# Patient Record
Sex: FEMALE | Race: WHITE | NOT HISPANIC OR LATINO | Employment: OTHER | URBAN - METROPOLITAN AREA
[De-identification: names, ages, dates, MRNs, and addresses within clinical notes are randomized per-mention and may not be internally consistent; named-entity substitution may affect disease eponyms.]

---

## 2024-08-04 ENCOUNTER — HOSPITAL ENCOUNTER (EMERGENCY)
Facility: HOSPITAL | Age: 89
Discharge: HOME | End: 2024-08-05
Attending: EMERGENCY MEDICINE
Payer: MEDICARE

## 2024-08-04 ENCOUNTER — APPOINTMENT (OUTPATIENT)
Dept: RADIOLOGY | Facility: HOSPITAL | Age: 89
End: 2024-08-04
Payer: MEDICARE

## 2024-08-04 DIAGNOSIS — M25.462 EFFUSION OF LEFT KNEE JOINT: Primary | ICD-10-CM

## 2024-08-04 DIAGNOSIS — E87.1 HYPONATREMIA: ICD-10-CM

## 2024-08-04 DIAGNOSIS — R31.9 URINARY TRACT INFECTION WITH HEMATURIA, SITE UNSPECIFIED: ICD-10-CM

## 2024-08-04 DIAGNOSIS — M25.562 ACUTE PAIN OF LEFT KNEE: ICD-10-CM

## 2024-08-04 DIAGNOSIS — N39.0 URINARY TRACT INFECTION WITH HEMATURIA, SITE UNSPECIFIED: ICD-10-CM

## 2024-08-04 PROCEDURE — 83735 ASSAY OF MAGNESIUM: CPT | Performed by: EMERGENCY MEDICINE

## 2024-08-04 PROCEDURE — 73502 X-RAY EXAM HIP UNI 2-3 VIEWS: CPT | Mod: LT

## 2024-08-04 PROCEDURE — 72125 CT NECK SPINE W/O DYE: CPT

## 2024-08-04 PROCEDURE — 85025 COMPLETE CBC W/AUTO DIFF WBC: CPT | Performed by: EMERGENCY MEDICINE

## 2024-08-04 PROCEDURE — 72125 CT NECK SPINE W/O DYE: CPT | Performed by: RADIOLOGY

## 2024-08-04 PROCEDURE — 85730 THROMBOPLASTIN TIME PARTIAL: CPT | Performed by: EMERGENCY MEDICINE

## 2024-08-04 PROCEDURE — 71045 X-RAY EXAM CHEST 1 VIEW: CPT | Performed by: RADIOLOGY

## 2024-08-04 PROCEDURE — 85610 PROTHROMBIN TIME: CPT | Performed by: EMERGENCY MEDICINE

## 2024-08-04 PROCEDURE — 84484 ASSAY OF TROPONIN QUANT: CPT | Performed by: EMERGENCY MEDICINE

## 2024-08-04 PROCEDURE — 84075 ASSAY ALKALINE PHOSPHATASE: CPT | Performed by: EMERGENCY MEDICINE

## 2024-08-04 PROCEDURE — 36415 COLL VENOUS BLD VENIPUNCTURE: CPT | Performed by: EMERGENCY MEDICINE

## 2024-08-04 PROCEDURE — 83880 ASSAY OF NATRIURETIC PEPTIDE: CPT | Performed by: EMERGENCY MEDICINE

## 2024-08-04 PROCEDURE — 73502 X-RAY EXAM HIP UNI 2-3 VIEWS: CPT | Mod: LEFT SIDE | Performed by: RADIOLOGY

## 2024-08-04 PROCEDURE — 99285 EMERGENCY DEPT VISIT HI MDM: CPT | Mod: 25

## 2024-08-04 PROCEDURE — 70450 CT HEAD/BRAIN W/O DYE: CPT

## 2024-08-04 PROCEDURE — 73564 X-RAY EXAM KNEE 4 OR MORE: CPT | Mod: LEFT SIDE | Performed by: RADIOLOGY

## 2024-08-04 PROCEDURE — 73564 X-RAY EXAM KNEE 4 OR MORE: CPT | Mod: LT

## 2024-08-04 PROCEDURE — 70450 CT HEAD/BRAIN W/O DYE: CPT | Performed by: RADIOLOGY

## 2024-08-04 PROCEDURE — 71045 X-RAY EXAM CHEST 1 VIEW: CPT

## 2024-08-04 RX ORDER — WARFARIN SODIUM 5 MG/1
TABLET ORAL
COMMUNITY
Start: 2024-03-21

## 2024-08-04 RX ORDER — LISINOPRIL AND HYDROCHLOROTHIAZIDE 10; 12.5 MG/1; MG/1
1.5 TABLET ORAL DAILY
COMMUNITY
Start: 2023-10-20

## 2024-08-04 RX ORDER — FUROSEMIDE 20 MG/1
20 TABLET ORAL
COMMUNITY
Start: 2024-08-02 | End: 2024-08-05

## 2024-08-04 RX ORDER — NITROFURANTOIN 25; 75 MG/1; MG/1
100 CAPSULE ORAL 2 TIMES DAILY
COMMUNITY
Start: 2024-08-02 | End: 2024-08-09

## 2024-08-04 RX ORDER — NAPROXEN SODIUM 220 MG/1
1 TABLET, FILM COATED ORAL DAILY
COMMUNITY

## 2024-08-04 RX ORDER — METHIMAZOLE 5 MG/1
TABLET ORAL
COMMUNITY
Start: 2024-07-12

## 2024-08-04 RX ORDER — LISINOPRIL 20 MG/1
20 TABLET ORAL
COMMUNITY
Start: 2024-07-26 | End: 2024-10-24

## 2024-08-04 RX ORDER — TRIAMCINOLONE ACETONIDE 1 MG/G
OINTMENT TOPICAL
COMMUNITY

## 2024-08-04 RX ORDER — METOPROLOL TARTRATE 25 MG/1
25 TABLET, FILM COATED ORAL EVERY 12 HOURS
COMMUNITY
Start: 2024-02-17

## 2024-08-04 RX ORDER — MECLIZINE HYDROCHLORIDE 25 MG/1
TABLET ORAL 3 TIMES DAILY PRN
COMMUNITY

## 2024-08-04 RX ORDER — TRAMADOL HYDROCHLORIDE 50 MG/1
50 TABLET ORAL EVERY 12 HOURS PRN
COMMUNITY
Start: 2024-07-03

## 2024-08-04 RX ORDER — TRAMADOL HYDROCHLORIDE 50 MG/1
TABLET ORAL
COMMUNITY

## 2024-08-04 RX ORDER — LISINOPRIL AND HYDROCHLOROTHIAZIDE 10; 12.5 MG/1; MG/1
1 TABLET ORAL DAILY
COMMUNITY

## 2024-08-04 ASSESSMENT — PAIN SCALES - GENERAL
PAINLEVEL_OUTOF10: 9
PAINLEVEL_OUTOF10: 9

## 2024-08-04 ASSESSMENT — PAIN - FUNCTIONAL ASSESSMENT: PAIN_FUNCTIONAL_ASSESSMENT: 0-10

## 2024-08-04 ASSESSMENT — LIFESTYLE VARIABLES
HAVE PEOPLE ANNOYED YOU BY CRITICIZING YOUR DRINKING: NO
EVER HAD A DRINK FIRST THING IN THE MORNING TO STEADY YOUR NERVES TO GET RID OF A HANGOVER: NO
EVER FELT BAD OR GUILTY ABOUT YOUR DRINKING: NO

## 2024-08-04 ASSESSMENT — PAIN DESCRIPTION - ORIENTATION: ORIENTATION: LEFT

## 2024-08-04 ASSESSMENT — PAIN DESCRIPTION - LOCATION: LOCATION: KNEE

## 2024-08-05 ENCOUNTER — APPOINTMENT (OUTPATIENT)
Dept: RADIOLOGY | Facility: HOSPITAL | Age: 89
End: 2024-08-05
Payer: MEDICARE

## 2024-08-05 ENCOUNTER — APPOINTMENT (OUTPATIENT)
Dept: CARDIOLOGY | Facility: HOSPITAL | Age: 89
End: 2024-08-05
Payer: MEDICARE

## 2024-08-05 VITALS
HEIGHT: 61 IN | SYSTOLIC BLOOD PRESSURE: 175 MMHG | WEIGHT: 143 LBS | HEART RATE: 76 BPM | RESPIRATION RATE: 18 BRPM | DIASTOLIC BLOOD PRESSURE: 75 MMHG | TEMPERATURE: 97.3 F | OXYGEN SATURATION: 98 % | BODY MASS INDEX: 27 KG/M2

## 2024-08-05 LAB
ALBUMIN SERPL BCP-MCNC: 4 G/DL (ref 3.4–5)
ALP SERPL-CCNC: 82 U/L (ref 33–136)
ALT SERPL W P-5'-P-CCNC: 7 U/L (ref 7–45)
ANION GAP SERPL CALC-SCNC: 16 MMOL/L (ref 10–20)
APPEARANCE UR: ABNORMAL
APTT PPP: 41 SECONDS (ref 27–38)
AST SERPL W P-5'-P-CCNC: 13 U/L (ref 9–39)
ATRIAL RATE: 85 BPM
BACTERIA #/AREA URNS AUTO: ABNORMAL /HPF
BASOPHILS # BLD AUTO: 0.06 X10*3/UL (ref 0–0.1)
BASOPHILS NFR BLD AUTO: 0.3 %
BILIRUB SERPL-MCNC: 0.7 MG/DL (ref 0–1.2)
BILIRUB UR STRIP.AUTO-MCNC: NEGATIVE MG/DL
BNP SERPL-MCNC: 175 PG/ML (ref 0–99)
BUN SERPL-MCNC: 21 MG/DL (ref 6–23)
CALCIUM SERPL-MCNC: 10 MG/DL (ref 8.6–10.3)
CARDIAC TROPONIN I PNL SERPL HS: 6 NG/L (ref 0–13)
CHLORIDE SERPL-SCNC: 93 MMOL/L (ref 98–107)
CO2 SERPL-SCNC: 22 MMOL/L (ref 21–32)
COLOR UR: ABNORMAL
CREAT SERPL-MCNC: 0.93 MG/DL (ref 0.5–1.05)
EGFRCR SERPLBLD CKD-EPI 2021: 58 ML/MIN/1.73M*2
EOSINOPHIL # BLD AUTO: 0.18 X10*3/UL (ref 0–0.4)
EOSINOPHIL NFR BLD AUTO: 1 %
ERYTHROCYTE [DISTWIDTH] IN BLOOD BY AUTOMATED COUNT: 12.6 % (ref 11.5–14.5)
GLUCOSE SERPL-MCNC: 104 MG/DL (ref 74–99)
GLUCOSE UR STRIP.AUTO-MCNC: NORMAL MG/DL
HCT VFR BLD AUTO: 38.4 % (ref 36–46)
HGB BLD-MCNC: 13.1 G/DL (ref 12–16)
HOLD SPECIMEN: NORMAL
IMM GRANULOCYTES # BLD AUTO: 0.11 X10*3/UL (ref 0–0.5)
IMM GRANULOCYTES NFR BLD AUTO: 0.6 % (ref 0–0.9)
INR PPP: 2.8 (ref 0.9–1.1)
KETONES UR STRIP.AUTO-MCNC: ABNORMAL MG/DL
LEUKOCYTE ESTERASE UR QL STRIP.AUTO: ABNORMAL
LYMPHOCYTES # BLD AUTO: 1.34 X10*3/UL (ref 0.8–3)
LYMPHOCYTES NFR BLD AUTO: 7.5 %
MAGNESIUM SERPL-MCNC: 1.76 MG/DL (ref 1.6–2.4)
MCH RBC QN AUTO: 29.9 PG (ref 26–34)
MCHC RBC AUTO-ENTMCNC: 34.1 G/DL (ref 32–36)
MCV RBC AUTO: 88 FL (ref 80–100)
MONOCYTES # BLD AUTO: 0.81 X10*3/UL (ref 0.05–0.8)
MONOCYTES NFR BLD AUTO: 4.5 %
NEUTROPHILS # BLD AUTO: 15.43 X10*3/UL (ref 1.6–5.5)
NEUTROPHILS NFR BLD AUTO: 86.1 %
NITRITE UR QL STRIP.AUTO: NEGATIVE
NRBC BLD-RTO: 0 /100 WBCS (ref 0–0)
P AXIS: 70 DEGREES
P OFFSET: 153 MS
P ONSET: 83 MS
PH UR STRIP.AUTO: 8 [PH]
PLATELET # BLD AUTO: 310 X10*3/UL (ref 150–450)
POTASSIUM SERPL-SCNC: 4.2 MMOL/L (ref 3.5–5.3)
PR INTERVAL: 276 MS
PROT SERPL-MCNC: 7.7 G/DL (ref 6.4–8.2)
PROT UR STRIP.AUTO-MCNC: ABNORMAL MG/DL
PROTHROMBIN TIME: 31.5 SECONDS (ref 9.8–12.8)
Q ONSET: 221 MS
QRS COUNT: 14 BEATS
QRS DURATION: 112 MS
QT INTERVAL: 388 MS
QTC CALCULATION(BAZETT): 461 MS
QTC FREDERICIA: 435 MS
R AXIS: -21 DEGREES
RBC # BLD AUTO: 4.38 X10*6/UL (ref 4–5.2)
RBC # UR STRIP.AUTO: ABNORMAL /UL
RBC #/AREA URNS AUTO: >20 /HPF
SODIUM SERPL-SCNC: 127 MMOL/L (ref 136–145)
SP GR UR STRIP.AUTO: 1.03
SQUAMOUS #/AREA URNS AUTO: ABNORMAL /HPF
T AXIS: 25 DEGREES
T OFFSET: 415 MS
UROBILINOGEN UR STRIP.AUTO-MCNC: NORMAL MG/DL
VENTRICULAR RATE: 85 BPM
WBC # BLD AUTO: 17.9 X10*3/UL (ref 4.4–11.3)
WBC #/AREA URNS AUTO: >50 /HPF
WBC CLUMPS #/AREA URNS AUTO: ABNORMAL /HPF

## 2024-08-05 PROCEDURE — 87086 URINE CULTURE/COLONY COUNT: CPT | Mod: ELYLAB | Performed by: EMERGENCY MEDICINE

## 2024-08-05 PROCEDURE — 81001 URINALYSIS AUTO W/SCOPE: CPT | Performed by: EMERGENCY MEDICINE

## 2024-08-05 PROCEDURE — 93005 ELECTROCARDIOGRAM TRACING: CPT

## 2024-08-05 PROCEDURE — 2500000004 HC RX 250 GENERAL PHARMACY W/ HCPCS (ALT 636 FOR OP/ED): Performed by: EMERGENCY MEDICINE

## 2024-08-05 PROCEDURE — 96374 THER/PROPH/DIAG INJ IV PUSH: CPT

## 2024-08-05 PROCEDURE — 73700 CT LOWER EXTREMITY W/O DYE: CPT | Mod: LEFT SIDE | Performed by: RADIOLOGY

## 2024-08-05 PROCEDURE — 73700 CT LOWER EXTREMITY W/O DYE: CPT | Mod: LT

## 2024-08-05 PROCEDURE — 96375 TX/PRO/DX INJ NEW DRUG ADDON: CPT

## 2024-08-05 RX ORDER — ACETAMINOPHEN 325 MG/1
1000 TABLET ORAL ONCE
Status: COMPLETED | OUTPATIENT
Start: 2024-08-05 | End: 2024-08-05

## 2024-08-05 RX ORDER — ONDANSETRON HYDROCHLORIDE 2 MG/ML
4 INJECTION, SOLUTION INTRAVENOUS ONCE
Status: COMPLETED | OUTPATIENT
Start: 2024-08-05 | End: 2024-08-05

## 2024-08-05 RX ORDER — FENTANYL CITRATE 50 UG/ML
25 INJECTION, SOLUTION INTRAMUSCULAR; INTRAVENOUS ONCE
Status: COMPLETED | OUTPATIENT
Start: 2024-08-05 | End: 2024-08-05

## 2024-08-05 ASSESSMENT — PAIN SCALES - GENERAL
PAINLEVEL_OUTOF10: 8
PAINLEVEL_OUTOF10: 8

## 2024-08-05 NOTE — DISCHARGE INSTRUCTIONS
Follow-up with your primary care doctor and orthopedic surgeon.  We offered admission for further evaluation and treatment of your symptoms, but you prefer to follow-up closely outpatient instead.  If you change your mind at any time you can return to the emergency department for further evaluation.  You can take Tylenol as directed for symptomatic treatment at home.  I recommend you continue to take oral antibiotic for your urinary tract infection.  You should also follow-up with your primary care doctor to have your sodium levels rechecked.    
No

## 2024-08-05 NOTE — ED PROVIDER NOTES
89-year-old female presents emergency department with chief complaint of left knee pain and swelling.  Patient reports about a week ago she had fallen onto her left side and has a bruise on her left thigh.  She reports since then she has been able to ambulate, but today around 8 PM all of a sudden her left knee hurt and she noticed it was swollen.  She denies any fevers, coughing, or congestion.  She denies any chest pain or significant difficulty breathing.  No abdominal pain, nausea, or vomiting.  No dysuria.  She reports constipation no black or bloody stools.  Patient reports that she does have some swelling in her legs that she states is chronic but slightly worse than normal.  Patient is on Coumadin based on chart review.  Patient reports that when she fell she had tripped because her left foot got twisted.  She reports she did hit her head, but did not lose consciousness.  Patient was not evaluated after this fall. Chart review shows significant past medical history for hypertension, osteoarthritis, hypertension, paroxysmal atrial fibrillation, peripheral vascular disease, chronic kidney disease, varicose veins, patient is on Coumadin.      History provided by:  Patient   used: No           ------------------------------------------------------------------------------------------------------------------------------------------    VS: As documented in the triage note and EMR flowsheet from this visit were reviewed.    Review of Systems  Constitutional: no fever, chills  Eyes: no redness, discharge, pain  HENT: no sore throat, nose bleeds, congestion, rhinorrhea   Cardiovascular: no chest pain, palpitations reports chronic leg edema  Respiratory: no shortness of breath, cough, wheezing  GI: no nausea, diarrhea, pain, vomiting,  BRBPR, melena admits to constipation  : no dysuria, frequency, hematuria  Musculoskeletal: no neck pain, stiffness, reports swelling and pain of the left  knee  Skin: no rash, erythema, wounds  Neurological: no headache, dizziness, lightheadedness, weakness, numbness, or tingling  Psychiatric: no suicidal thoughts, confusion, agitation  Metabolic: no polyuria or polydipsia  Hematologic: Patient is on Coumadin  Immunology: No immunocompromise state    [facility]  Nursing notes reviewed and confirmed by me.  Chart review performed including medications, allergies, and medical, surgical, and family history  Visit Vitals  /75   Pulse 76   Temp 36.3 °C (97.3 °F)   Resp 18     Physical Exam  Vitals and nursing note reviewed.   Constitutional:       General: She is not in acute distress.     Appearance: Normal appearance. She is not ill-appearing.   HENT:      Head: Normocephalic and atraumatic.      Right Ear: External ear normal.      Left Ear: External ear normal.      Nose: Nose normal. No congestion or rhinorrhea.      Mouth/Throat:      Mouth: Mucous membranes are moist.      Pharynx: No oropharyngeal exudate or posterior oropharyngeal erythema.   Eyes:      Extraocular Movements: Extraocular movements intact.      Conjunctiva/sclera: Conjunctivae normal.      Pupils: Pupils are equal, round, and reactive to light.   Neck:      Comments: No midline neck tenderness, step-offs, or deformities  Cardiovascular:      Rate and Rhythm: Normal rate and regular rhythm.      Pulses: Normal pulses.      Heart sounds: Normal heart sounds.      Comments: 1+ DP pulses bilaterally.  Pulmonary:      Effort: Pulmonary effort is normal. No respiratory distress.      Breath sounds: No stridor. No wheezing, rhonchi or rales.      Comments: Coarse breath sounds bilaterally somewhat diminished in the bases.  Abdominal:      General: There is no distension.      Palpations: Abdomen is soft.      Tenderness: There is no abdominal tenderness. There is no guarding or rebound.   Musculoskeletal:         General: Swelling, tenderness and signs of injury present. No deformity.      Cervical back:  Neck supple. No rigidity.      Left knee: Swelling and effusion present. Decreased range of motion. Tenderness present.      Right lower leg: Edema present.      Left lower leg: Edema present.      Comments: No calf tenderness   1+ pitting edema bilateral lower extremities.  Left knee is appreciated to be swollen when compared to the right.  It is normal in color no erythema or warmth.  There is appreciated to be slight effusion.  Patient has pain at extremes of extension and flexion, but is able to range the knee.  Tenderness to palpation on the lateral aspect of the knee.  Pelvis is stable.  No midline back tenderness, step-offs, or deformities.   Skin:     General: Skin is warm and dry.      Capillary Refill: Capillary refill takes less than 2 seconds.      Coloration: Skin is not jaundiced.      Findings: No rash.      Comments: Ecchymosis lateral left thigh.     Neurological:      General: No focal deficit present.      Mental Status: She is alert and oriented to person, place, and time.      Sensory: No sensory deficit.      Motor: No weakness.      Comments: Cranial nerves II through XII are grossly intact.   Psychiatric:         Mood and Affect: Mood normal.         Behavior: Behavior normal.        History reviewed. No pertinent past medical history.   Past Surgical History:   Procedure Laterality Date    CT ABDOMEN PELVIS ANGIOGRAM W AND/OR WO IV CONTRAST  9/8/2019    CT ABDOMEN PELVIS ANGIOGRAM W AND/OR WO IV [facility]AST [facility]      Social History     Socioeconomic History    Marital status:      Social Determinants of Health     Financial Resource Strain: Patient Declined (7/19/2024)    Received from [facility] [place]    Overall Financial Resource Strain (CARDIA)     Difficulty of Paying Living Expenses: Patient declined   Food Insecurity: Patient Declined (7/19/2024)    Received from [facility] [place]    Hunger Vital Sign     Worried About Running Out of Food in the Last Year:  Patient declined     Ran Out of Food in the Last Year: Patient declined   Transportation Needs: Patient Declined (7/19/2024)    Received from [facility]    PRAPARE - Transportation     Lack of Transportation (Medical): Patient declined     Lack of Transportation (Non-Medical): Patient declined   Physical Activity: Patient Declined (7/19/2024)    Received from [facility]    Exercise Vital Sign     Days of Exercise per Week: Patient declined     Minutes of Exercise per Session: Patient declined   Stress: Patient Declined (1/19/2024)    Received from [facility]    [employer] - Occupational Stress Questionnaire     Feeling of Stress : Patient declined   Social Connections: Unknown (7/19/2024)    Received from [facility]    Social Connection and Isolation Panel [NHANES]     Frequency of Communication with Friends and Family: More than three times a week     Frequency of Social Gatherings with Friends and Family: Patient declined     Attends Jain Services: Patient declined     Active Member of Clubs or Organizations: Yes     Attends Club or Organization Meetings: More than 4 times per year     Marital Status: Patient declined      ------------------------------------------------------------------------------------------------------------------------------------------  CT hip left wo IV contrast   Final Result   No evidence of acute displaced left hip fracture.        MACRO:   None        Signed by: Tam Gutiérrez 8/5/2024 3:18 AM   Dictation workstation:   [id number]      CT knee left wo IV contrast   Final Result   No definite evidence of acute displaced left knee fracture.        Left knee osteoarthrosis.        Large hemorrhagic left knee effusion; orthopedic   consultation/evaluation is recommended for further assessment given   the unexplained large hemorrhagic effusion.        MACRO:   None        Signed by: Tam Gutiérrez 8/5/2024 3:36 AM   Dictation  workstation:   [id number]      XR chest 1 view   Final Result   1. Mild diffuse interstitial prominence, likely interstitial edema.   2. Bilateral reticular opacities, likely fibrotic change.        Signed by: Bear Roe 8/5/2024 12:54 AM   Dictation workstation:   [id number]      XR hip left with pelvis when performed 2 or 3 views   Final Result   1. No acute osseous abnormality. Diffuse osteopenia limits evaluation   for subtle fractures.        Signed by: Bear oRe 8/5/2024 12:58 AM   Dictation workstation:   [id number]      XR knee left 4+ views   Final Result   1. No acute fracture detected. If there is concern for   radiographically occult fracture, consider CT.   2. Large knee joint effusion.   3. Mild osteoarthritis.        Signed by: Bear Roe 8/5/2024 12:56 AM   Dictation workstation:   [id number]      CT head wo IV contrast   Final Result   1. No acute intracranial hemorrhage or mass effect.   2. No acute fracture or traumatic malalignment of the cervical spine.        Signed by: Bear Roe 8/5/2024 12:53 AM   Dictation workstation:   [id number]      CT cervical spine wo IV contrast   Final Result   1. No acute intracranial hemorrhage or mass effect.   2. No acute fracture or traumatic malalignment of the cervical spine.        Signed by: Bear Roe 8/5/2024 12:53 AM   Dictation workstation:   [id number]         Labs Reviewed   CBC WITH AUTO DIFFERENTIAL - Abnormal       Result Value    WBC 17.9 (*)     nRBC 0.0      RBC 4.38      Hemoglobin 13.1      Hematocrit 38.4      MCV 88      MCH 29.9      MCHC 34.1      RDW 12.6      Platelets 310      Neutrophils % 86.1      Immature Granulocytes %, Automated 0.6      Lymphocytes % 7.5      Monocytes % 4.5      Eosinophils % 1.0      Basophils % 0.3      Neutrophils Absolute 15.43 (*)     Immature Granulocytes Absolute, Automated 0.11      Lymphocytes Absolute 1.34      Monocytes Absolute 0.81 (*)      Eosinophils Absolute 0.18      Basophils Absolute 0.06     COMPREHENSIVE METABOLIC PANEL - Abnormal    Glucose 104 (*)     Sodium 127 (*)     Potassium 4.2      Chloride 93 (*)     Bicarbonate 22      Anion Gap 16      Urea Nitrogen 21      Creatinine 0.93      eGFR 58 (*)     Calcium 10.0      Albumin 4.0      Alkaline Phosphatase 82      Total Protein 7.7      AST 13      Bilirubin, Total 0.7      ALT 7     PROTIME-INR - Abnormal    Protime 31.5 (*)     INR 2.8 (*)    APTT - Abnormal    aPTT 41 (*)     Narrative:     The APTT is no longer used for monitoring Unfractionated Heparin Therapy. For monitoring Heparin Therapy, use the Heparin Assay.   URINALYSIS WITH REFLEX CULTURE AND MICROSCOPIC - Abnormal    Color, Urine Dark-Yellow      Appearance, Urine Ex.Turbid (*)     Specific Gravity, Urine 1.026      pH, Urine 8.0      Protein, Urine 100 (2+) (*)     Glucose, Urine Normal      Blood, Urine OVER (3+) (*)     Ketones, Urine TRACE (*)     Bilirubin, Urine NEGATIVE      Urobilinogen, Urine Normal      Nitrite, Urine NEGATIVE      Leukocyte Esterase, Urine 500 Jessi/µL (*)    B-TYPE NATRIURETIC PEPTIDE - Abnormal     (*)     Narrative:        <100 pg/mL - Heart failure unlikely  100-299 pg/mL - Intermediate probability of acute heart                  failure exacerbation. Correlate with clinical                  context and patient history.    >=300 pg/mL - Heart Failure likely. Correlate with clinical                  context and patient history.    BNP testing is performed using different testing methodology at [facility] than at other [facility]. Direct result comparisons should only be made within the same method.      MICROSCOPIC ONLY, URINE - Abnormal    WBC, Urine >50 (*)     WBC Clumps, Urine MANY      RBC, Urine >20 (*)     Squamous Epithelial Cells, Urine 1-9 (SPARSE)      Bacteria, Urine 1+ (*)    MAGNESIUM - Normal    Magnesium 1.76     TROPONIN I, HIGH SENSITIVITY - Normal     Troponin I, High Sensitivity 6      Narrative:     Less than 99th percentile of normal range cutoff-  Female and children under 18 years old <14 ng/L; Male <21 ng/L: Negative  Repeat testing should be performed if clinically indicated.     Female and children under 18 years old 14-50 ng/L; Male 21-50 ng/L:  Consistent with possible cardiac damage and possible increased clinical   risk. Serial measurements may help to assess extent of myocardial damage.     >50 ng/L: Consistent with cardiac damage, increased clinical risk and  myocardial infarction. Serial measurements may help assess extent of   myocardial damage.      NOTE: Children less than 1 year old may have higher baseline troponin   levels and results should be interpreted in conjunction with the overall   clinical context.     NOTE: Troponin I testing is performed using a different   testing methodology at [facility] than at other   [facility]. Direct result comparisons should only   be made within the same method.   URINE CULTURE   URINALYSIS WITH REFLEX CULTURE AND MICROSCOPIC    Narrative:     The following orders were created for panel order Urinalysis with Reflex Culture and Microscopic.  Procedure                               Abnormality         Status                     ---------                               -----------         ------                     Urinalysis with Reflex C...[[id number]]  Abnormal            Final result               Extra Urine Gray Tube[[id number]]                            In process                   Please view results for these tests on the individual orders.   EXTRA URINE GRAY TUBE        Medical Decision Making  EKG interpreted by ED physician: Sinus rhythm with first-degree AV block rate of 85.  ME is prolonged at 276 consistent with first-degree AV block.  QRS and QTc are within normal range.  No significant ST elevations or depressions.  No significant Q waves.  Good R wave progression.  Left  axis deviation.    89-year-old female presents emergency department with chief complaint of left knee pain and swelling.  Patient does admit to a fall about a week ago and she has significant history of being on Coumadin.  Given her presenting complaints a thorough workup is obtained.  On my exam the patient is afebrile and nontoxic.  Bilateral lower extremities are neurovascularly intact with 1+ DP pulses and equal strength/sensation with plantar and dorsi flexion.  Patient's left knee is appreciated to be swollen with effusion, but no erythema or warmth.  She does not have any short arc tenderness with range of motion and after symptomatic treatment with fentanyl and Tylenol she has improved range of motion.  She does have some pain at extremes of motion.  Clinically patient does not have findings consistent with septic joint.  Head CT and cervical spine CT obtained given patient's reported fall does not show any traumatic injury such as hemorrhage, mass effect, or traumatic fracture.  Chest x-ray obtained given the patient's peripheral edema does show some mild interstitial edema patient was recently put on a short course of Lasix.  BNP is not significantly elevated.  At this time I do not feel patient has significantly decompensated heart failure.  She is adequate oxygen saturation on room air.  X-ray imaging of the left knee does not show any fracture or malalignment though a large joint effusion is appreciated along with osteoarthritis.  X-ray imaging of the left hip shows degenerative changes, but no fracture or malalignment.  CT imaging is obtained of the left lower extremity for further evaluation and ruling out occult fractures.  CT imaging of the hip shows no acute displaced left hip fracture.  CT of the knee demonstrates process in the lower hemorrhagic left knee effusion.  I discussed with patient and granddaughter at bedside that her hemorrhagic effusion could be traumatic in nature from her fall, but  could have associated ligamentous injury, or could be spontaneous due to her being on Coumadin.  We discussed that the Coumadin likely puts her at higher risk for the hemorrhagic effusion.  Advised her of need for orthopedic evaluation for this.  I offered admission if the patient is having difficulties getting around for urgent orthopedic evaluation as well as PT OT sb.  Patient and granddaughter at bedside declined stating she would really prefer to go home.  She and family also states they are working to follow-up with their orthopedic through [facility].  I offered the walk-in clinic through  or to schedule them with a  orthopedic surgeon for follow-up and they declined stating they will not have issues getting into their [facility] orthopedic.  Patient's lab workup was significant for leukocytosis and findings of urinary tract infection as well as hyponatremia.  I discussed these findings with the patient and family.  These findings are known to the patient as she is currently being treated outpatient for UTI with Macrobid.  She started this medication on Friday and states she feels her urinary symptoms are improving.  In addition, chart review does show patient had leukocytosis on 7/31/2024 labs at outside hospital system at which time she also had a UTI.  Patient's white count is somewhat worsened from previous which I discussed with them that this may be due to her orthopedic injury as well as her UTI.  Though I continue to have low suspicion for septic joint.  CMP demonstrated hyponatremia which family states she has been is currently being seen for outpatient.  Her labs on 7/26 showed a sodium of 123 and on 7/31 her sodium was 130.  Patient is given gentle fluid bolus for hyponatremia.  Cardiac enzyme and EKG do not show acute ACS.  Patient does not have findings of sepsis.  I did offer admission to patient and family given her knee pain, hemorrhagic effusion, and UTI with hyponatremia.   They declined stating that they felt comfortable going home and following up with their primary care providers.  Advised on reasons to return to the emergency department.  Patient was able to ambulate with steady gait using a walker here in the ED after symptomatic treatment.  Patient was provided with Ace wrap and advised on symptomatic treatment at home with Tylenol.  I recommended outpatient follow-up within the next 1 to 2 days.  If they were unable to follow-up outpatient I recommended they come back to the emergency department for evaluation.       Diagnoses as of 08/05/24 0501   Effusion of left knee joint - Hemorrhagic   Acute pain of left knee   Hyponatremia   Urinary tract infection with hematuria, site unspecified      1. Effusion of left knee joint      Hemorrhagic      2. Acute pain of left knee        3. Hyponatremia        4. Urinary tract infection with hematuria, site unspecified           Procedures     This note was dictated using dragon software and may contain errors related to dictation interpretation errors.      Kurtis Hanks,   08/05/24 0501       Kurtis Hanks,   08/05/24 0502

## 2024-08-06 LAB — BACTERIA UR CULT: NORMAL

## 2024-08-29 ENCOUNTER — APPOINTMENT (OUTPATIENT)
Dept: RADIOLOGY | Facility: HOSPITAL | Age: 89
DRG: 871 | End: 2024-08-29
Payer: MEDICARE

## 2024-08-29 ENCOUNTER — HOSPITAL ENCOUNTER (INPATIENT)
Facility: HOSPITAL | Age: 89
End: 2024-08-29
Attending: STUDENT IN AN ORGANIZED HEALTH CARE EDUCATION/TRAINING PROGRAM | Admitting: STUDENT IN AN ORGANIZED HEALTH CARE EDUCATION/TRAINING PROGRAM
Payer: MEDICARE

## 2024-08-29 DIAGNOSIS — L03.90 SEPSIS DUE TO CELLULITIS (MULTI): ICD-10-CM

## 2024-08-29 DIAGNOSIS — Z86.718 HISTORY OF DVT (DEEP VEIN THROMBOSIS): ICD-10-CM

## 2024-08-29 DIAGNOSIS — I10 HYPERTENSION, UNSPECIFIED TYPE: ICD-10-CM

## 2024-08-29 DIAGNOSIS — L03.116 CELLULITIS OF LEFT LOWER EXTREMITY: ICD-10-CM

## 2024-08-29 DIAGNOSIS — K59.00 CONSTIPATION, UNSPECIFIED CONSTIPATION TYPE: ICD-10-CM

## 2024-08-29 DIAGNOSIS — M25.00 HEMARTHROSIS: Primary | ICD-10-CM

## 2024-08-29 DIAGNOSIS — Z79.01 CURRENT USE OF LONG TERM ANTICOAGULATION: ICD-10-CM

## 2024-08-29 DIAGNOSIS — K21.00 GASTROESOPHAGEAL REFLUX DISEASE WITH ESOPHAGITIS WITHOUT HEMORRHAGE: ICD-10-CM

## 2024-08-29 DIAGNOSIS — A41.9 SEPSIS WITHOUT ACUTE ORGAN DYSFUNCTION, DUE TO UNSPECIFIED ORGANISM (MULTI): ICD-10-CM

## 2024-08-29 DIAGNOSIS — A41.9 SEPSIS DUE TO CELLULITIS (MULTI): ICD-10-CM

## 2024-08-29 DIAGNOSIS — E87.1 HYPONATREMIA: ICD-10-CM

## 2024-08-29 DIAGNOSIS — I21.4 NSTEMI (NON-ST ELEVATED MYOCARDIAL INFARCTION) (MULTI): ICD-10-CM

## 2024-08-29 LAB
ALBUMIN SERPL BCP-MCNC: 3.5 G/DL (ref 3.4–5)
ALP SERPL-CCNC: 71 U/L (ref 33–136)
ALT SERPL W P-5'-P-CCNC: 8 U/L (ref 7–45)
ANION GAP SERPL CALC-SCNC: 14 MMOL/L (ref 10–20)
ANION GAP SERPL CALC-SCNC: 15 MMOL/L (ref 10–20)
APPEARANCE UR: CLEAR
APTT PPP: 42 SECONDS (ref 27–38)
AST SERPL W P-5'-P-CCNC: 22 U/L (ref 9–39)
BACTERIA #/AREA URNS AUTO: ABNORMAL /HPF
BASOPHILS # BLD AUTO: 0.09 X10*3/UL (ref 0–0.1)
BASOPHILS NFR BLD AUTO: 0.6 %
BILIRUB SERPL-MCNC: 0.7 MG/DL (ref 0–1.2)
BILIRUB UR STRIP.AUTO-MCNC: NEGATIVE MG/DL
BUN SERPL-MCNC: 20 MG/DL (ref 6–23)
BUN SERPL-MCNC: 25 MG/DL (ref 6–23)
CALCIUM SERPL-MCNC: 9.3 MG/DL (ref 8.6–10.3)
CALCIUM SERPL-MCNC: 9.7 MG/DL (ref 8.6–10.3)
CHLORIDE SERPL-SCNC: 96 MMOL/L (ref 98–107)
CHLORIDE SERPL-SCNC: 96 MMOL/L (ref 98–107)
CO2 SERPL-SCNC: 22 MMOL/L (ref 21–32)
CO2 SERPL-SCNC: 22 MMOL/L (ref 21–32)
COLOR UR: ABNORMAL
CREAT SERPL-MCNC: 1.02 MG/DL (ref 0.5–1.05)
CREAT SERPL-MCNC: 1.13 MG/DL (ref 0.5–1.05)
EGFRCR SERPLBLD CKD-EPI 2021: 46 ML/MIN/1.73M*2
EGFRCR SERPLBLD CKD-EPI 2021: 52 ML/MIN/1.73M*2
EOSINOPHIL # BLD AUTO: 0.35 X10*3/UL (ref 0–0.4)
EOSINOPHIL NFR BLD AUTO: 2.4 %
ERYTHROCYTE [DISTWIDTH] IN BLOOD BY AUTOMATED COUNT: 12.8 % (ref 11.5–14.5)
GLUCOSE SERPL-MCNC: 110 MG/DL (ref 74–99)
GLUCOSE SERPL-MCNC: 112 MG/DL (ref 74–99)
GLUCOSE UR STRIP.AUTO-MCNC: NORMAL MG/DL
HCT VFR BLD AUTO: 31 % (ref 36–46)
HGB BLD-MCNC: 10.5 G/DL (ref 12–16)
HOLD SPECIMEN: NORMAL
HOLD SPECIMEN: NORMAL
IMM GRANULOCYTES # BLD AUTO: 0.05 X10*3/UL (ref 0–0.5)
IMM GRANULOCYTES NFR BLD AUTO: 0.3 % (ref 0–0.9)
INR PPP: 3.3 (ref 0.9–1.1)
KETONES UR STRIP.AUTO-MCNC: NEGATIVE MG/DL
LACTATE SERPL-SCNC: 1.1 MMOL/L (ref 0.4–2)
LEUKOCYTE ESTERASE UR QL STRIP.AUTO: ABNORMAL
LYMPHOCYTES # BLD AUTO: 1.33 X10*3/UL (ref 0.8–3)
LYMPHOCYTES NFR BLD AUTO: 9 %
MCH RBC QN AUTO: 29.8 PG (ref 26–34)
MCHC RBC AUTO-ENTMCNC: 33.9 G/DL (ref 32–36)
MCV RBC AUTO: 88 FL (ref 80–100)
MONOCYTES # BLD AUTO: 0.81 X10*3/UL (ref 0.05–0.8)
MONOCYTES NFR BLD AUTO: 5.5 %
NEUTROPHILS # BLD AUTO: 12.07 X10*3/UL (ref 1.6–5.5)
NEUTROPHILS NFR BLD AUTO: 82.2 %
NITRITE UR QL STRIP.AUTO: NEGATIVE
NRBC BLD-RTO: 0 /100 WBCS (ref 0–0)
PH UR STRIP.AUTO: 7 [PH]
PLATELET # BLD AUTO: 393 X10*3/UL (ref 150–450)
POTASSIUM SERPL-SCNC: 4 MMOL/L (ref 3.5–5.3)
POTASSIUM SERPL-SCNC: 4.1 MMOL/L (ref 3.5–5.3)
PROT SERPL-MCNC: 6.8 G/DL (ref 6.4–8.2)
PROT UR STRIP.AUTO-MCNC: ABNORMAL MG/DL
PROTHROMBIN TIME: 38.2 SECONDS (ref 9.8–12.8)
RBC # BLD AUTO: 3.52 X10*6/UL (ref 4–5.2)
RBC # UR STRIP.AUTO: ABNORMAL /UL
RBC #/AREA URNS AUTO: ABNORMAL /HPF
SODIUM SERPL-SCNC: 128 MMOL/L (ref 136–145)
SODIUM SERPL-SCNC: 129 MMOL/L (ref 136–145)
SP GR UR STRIP.AUTO: 1.04
SQUAMOUS #/AREA URNS AUTO: ABNORMAL /HPF
UROBILINOGEN UR STRIP.AUTO-MCNC: NORMAL MG/DL
WBC # BLD AUTO: 14.7 X10*3/UL (ref 4.4–11.3)
WBC #/AREA URNS AUTO: >50 /HPF
YEAST BUDDING #/AREA UR COMP ASSIST: PRESENT /HPF

## 2024-08-29 PROCEDURE — 1200000002 HC GENERAL ROOM WITH TELEMETRY DAILY

## 2024-08-29 PROCEDURE — 80051 ELECTROLYTE PANEL: CPT | Performed by: HOSPITALIST

## 2024-08-29 PROCEDURE — 73564 X-RAY EXAM KNEE 4 OR MORE: CPT | Mod: LEFT SIDE | Performed by: RADIOLOGY

## 2024-08-29 PROCEDURE — 93971 EXTREMITY STUDY: CPT | Performed by: RADIOLOGY

## 2024-08-29 PROCEDURE — 99232 SBSQ HOSP IP/OBS MODERATE 35: CPT | Performed by: HOSPITALIST

## 2024-08-29 PROCEDURE — 2500000004 HC RX 250 GENERAL PHARMACY W/ HCPCS (ALT 636 FOR OP/ED): Performed by: STUDENT IN AN ORGANIZED HEALTH CARE EDUCATION/TRAINING PROGRAM

## 2024-08-29 PROCEDURE — 36415 COLL VENOUS BLD VENIPUNCTURE: CPT | Performed by: STUDENT IN AN ORGANIZED HEALTH CARE EDUCATION/TRAINING PROGRAM

## 2024-08-29 PROCEDURE — 73706 CT ANGIO LWR EXTR W/O&W/DYE: CPT | Mod: LEFT SIDE | Performed by: RADIOLOGY

## 2024-08-29 PROCEDURE — 99223 1ST HOSP IP/OBS HIGH 75: CPT | Performed by: STUDENT IN AN ORGANIZED HEALTH CARE EDUCATION/TRAINING PROGRAM

## 2024-08-29 PROCEDURE — 84075 ASSAY ALKALINE PHOSPHATASE: CPT | Performed by: STUDENT IN AN ORGANIZED HEALTH CARE EDUCATION/TRAINING PROGRAM

## 2024-08-29 PROCEDURE — 73706 CT ANGIO LWR EXTR W/O&W/DYE: CPT | Mod: LT

## 2024-08-29 PROCEDURE — 87040 BLOOD CULTURE FOR BACTERIA: CPT | Mod: ELYLAB | Performed by: STUDENT IN AN ORGANIZED HEALTH CARE EDUCATION/TRAINING PROGRAM

## 2024-08-29 PROCEDURE — 2500000001 HC RX 250 WO HCPCS SELF ADMINISTERED DRUGS (ALT 637 FOR MEDICARE OP): Performed by: NURSE PRACTITIONER

## 2024-08-29 PROCEDURE — 73564 X-RAY EXAM KNEE 4 OR MORE: CPT | Mod: LT

## 2024-08-29 PROCEDURE — 96375 TX/PRO/DX INJ NEW DRUG ADDON: CPT

## 2024-08-29 PROCEDURE — 2500000005 HC RX 250 GENERAL PHARMACY W/O HCPCS: Performed by: STUDENT IN AN ORGANIZED HEALTH CARE EDUCATION/TRAINING PROGRAM

## 2024-08-29 PROCEDURE — 2500000001 HC RX 250 WO HCPCS SELF ADMINISTERED DRUGS (ALT 637 FOR MEDICARE OP): Performed by: HOSPITALIST

## 2024-08-29 PROCEDURE — 99222 1ST HOSP IP/OBS MODERATE 55: CPT | Performed by: ORTHOPAEDIC SURGERY

## 2024-08-29 PROCEDURE — 85610 PROTHROMBIN TIME: CPT | Performed by: STUDENT IN AN ORGANIZED HEALTH CARE EDUCATION/TRAINING PROGRAM

## 2024-08-29 PROCEDURE — 83605 ASSAY OF LACTIC ACID: CPT | Performed by: STUDENT IN AN ORGANIZED HEALTH CARE EDUCATION/TRAINING PROGRAM

## 2024-08-29 PROCEDURE — 96374 THER/PROPH/DIAG INJ IV PUSH: CPT | Mod: 59

## 2024-08-29 PROCEDURE — 2550000001 HC RX 255 CONTRASTS: Performed by: STUDENT IN AN ORGANIZED HEALTH CARE EDUCATION/TRAINING PROGRAM

## 2024-08-29 PROCEDURE — 85025 COMPLETE CBC W/AUTO DIFF WBC: CPT | Performed by: STUDENT IN AN ORGANIZED HEALTH CARE EDUCATION/TRAINING PROGRAM

## 2024-08-29 PROCEDURE — 87086 URINE CULTURE/COLONY COUNT: CPT | Mod: ELYLAB | Performed by: STUDENT IN AN ORGANIZED HEALTH CARE EDUCATION/TRAINING PROGRAM

## 2024-08-29 PROCEDURE — 99291 CRITICAL CARE FIRST HOUR: CPT | Mod: 25 | Performed by: STUDENT IN AN ORGANIZED HEALTH CARE EDUCATION/TRAINING PROGRAM

## 2024-08-29 PROCEDURE — 93971 EXTREMITY STUDY: CPT

## 2024-08-29 PROCEDURE — 81001 URINALYSIS AUTO W/SCOPE: CPT | Performed by: STUDENT IN AN ORGANIZED HEALTH CARE EDUCATION/TRAINING PROGRAM

## 2024-08-29 RX ORDER — CALCIUM CARBONATE 200(500)MG
500 TABLET,CHEWABLE ORAL DAILY
Status: DISCONTINUED | OUTPATIENT
Start: 2024-08-29 | End: 2024-09-09 | Stop reason: HOSPADM

## 2024-08-29 RX ORDER — FENTANYL CITRATE 50 UG/ML
25 INJECTION, SOLUTION INTRAMUSCULAR; INTRAVENOUS ONCE
Status: COMPLETED | OUTPATIENT
Start: 2024-08-29 | End: 2024-08-29

## 2024-08-29 RX ORDER — ACETAMINOPHEN 160 MG/5ML
650 SOLUTION ORAL EVERY 4 HOURS PRN
Status: DISCONTINUED | OUTPATIENT
Start: 2024-08-29 | End: 2024-09-09 | Stop reason: HOSPADM

## 2024-08-29 RX ORDER — ONDANSETRON HYDROCHLORIDE 2 MG/ML
4 INJECTION, SOLUTION INTRAVENOUS EVERY 8 HOURS PRN
Status: DISCONTINUED | OUTPATIENT
Start: 2024-08-29 | End: 2024-09-09 | Stop reason: HOSPADM

## 2024-08-29 RX ORDER — WARFARIN 2.5 MG/1
2.5 TABLET ORAL DAILY
COMMUNITY
End: 2024-09-09 | Stop reason: HOSPADM

## 2024-08-29 RX ORDER — ACETAMINOPHEN 650 MG/1
650 SUPPOSITORY RECTAL EVERY 4 HOURS PRN
Status: DISCONTINUED | OUTPATIENT
Start: 2024-08-29 | End: 2024-09-09 | Stop reason: HOSPADM

## 2024-08-29 RX ORDER — OXYCODONE HYDROCHLORIDE 5 MG/1
5 TABLET ORAL EVERY 4 HOURS PRN
Status: DISCONTINUED | OUTPATIENT
Start: 2024-08-29 | End: 2024-09-09 | Stop reason: HOSPADM

## 2024-08-29 RX ORDER — METOPROLOL TARTRATE 25 MG/1
25 TABLET, FILM COATED ORAL EVERY 12 HOURS
Status: DISCONTINUED | OUTPATIENT
Start: 2024-08-29 | End: 2024-09-09

## 2024-08-29 RX ORDER — ACETAMINOPHEN 325 MG/1
650 TABLET ORAL EVERY 4 HOURS PRN
Status: DISCONTINUED | OUTPATIENT
Start: 2024-08-29 | End: 2024-09-09 | Stop reason: HOSPADM

## 2024-08-29 RX ORDER — CEFAZOLIN SODIUM 1 G/50ML
1 SOLUTION INTRAVENOUS EVERY 12 HOURS
Status: DISCONTINUED | OUTPATIENT
Start: 2024-08-29 | End: 2024-08-30

## 2024-08-29 RX ORDER — POLYETHYLENE GLYCOL 3350 17 G/17G
17 POWDER, FOR SOLUTION ORAL DAILY
Status: DISCONTINUED | OUTPATIENT
Start: 2024-08-29 | End: 2024-09-09 | Stop reason: HOSPADM

## 2024-08-29 RX ORDER — TALC
3 POWDER (GRAM) TOPICAL NIGHTLY PRN
Status: DISCONTINUED | OUTPATIENT
Start: 2024-08-29 | End: 2024-09-09 | Stop reason: HOSPADM

## 2024-08-29 RX ORDER — SODIUM CHLORIDE, SODIUM LACTATE, POTASSIUM CHLORIDE, CALCIUM CHLORIDE 600; 310; 30; 20 MG/100ML; MG/100ML; MG/100ML; MG/100ML
100 INJECTION, SOLUTION INTRAVENOUS CONTINUOUS
Status: ACTIVE | OUTPATIENT
Start: 2024-08-29 | End: 2024-08-29

## 2024-08-29 RX ORDER — CEPHALEXIN 500 MG/1
500 CAPSULE ORAL 2 TIMES DAILY
COMMUNITY
End: 2024-09-09 | Stop reason: HOSPADM

## 2024-08-29 RX ORDER — AMOXICILLIN 250 MG
1 CAPSULE ORAL NIGHTLY
Status: DISCONTINUED | OUTPATIENT
Start: 2024-08-29 | End: 2024-09-05

## 2024-08-29 RX ORDER — OXYCODONE HYDROCHLORIDE 5 MG/1
2.5 TABLET ORAL EVERY 4 HOURS PRN
Status: DISCONTINUED | OUTPATIENT
Start: 2024-08-29 | End: 2024-09-09 | Stop reason: HOSPADM

## 2024-08-29 RX ORDER — LISINOPRIL 20 MG/1
20 TABLET ORAL
Status: DISCONTINUED | OUTPATIENT
Start: 2024-08-30 | End: 2024-09-06

## 2024-08-29 RX ORDER — CEFAZOLIN SODIUM 2 G/100ML
2 INJECTION, SOLUTION INTRAVENOUS ONCE
Status: COMPLETED | OUTPATIENT
Start: 2024-08-29 | End: 2024-08-29

## 2024-08-29 RX ORDER — LIDOCAINE 560 MG/1
1 PATCH PERCUTANEOUS; TOPICAL; TRANSDERMAL DAILY
Status: DISCONTINUED | OUTPATIENT
Start: 2024-08-29 | End: 2024-09-04

## 2024-08-29 RX ORDER — FENTANYL CITRATE 50 UG/ML
50 INJECTION, SOLUTION INTRAMUSCULAR; INTRAVENOUS ONCE
Status: COMPLETED | OUTPATIENT
Start: 2024-08-29 | End: 2024-08-29

## 2024-08-29 RX ORDER — ONDANSETRON 4 MG/1
4 TABLET, FILM COATED ORAL EVERY 8 HOURS PRN
Status: DISCONTINUED | OUTPATIENT
Start: 2024-08-29 | End: 2024-09-09 | Stop reason: HOSPADM

## 2024-08-29 RX ORDER — FAMOTIDINE 20 MG/1
20 TABLET, FILM COATED ORAL DAILY
Status: DISCONTINUED | OUTPATIENT
Start: 2024-08-29 | End: 2024-09-09 | Stop reason: HOSPADM

## 2024-08-29 RX ORDER — NAPROXEN SODIUM 220 MG/1
81 TABLET, FILM COATED ORAL DAILY
Status: DISCONTINUED | OUTPATIENT
Start: 2024-08-29 | End: 2024-09-09 | Stop reason: HOSPADM

## 2024-08-29 RX ORDER — MORPHINE SULFATE 4 MG/ML
4 INJECTION, SOLUTION INTRAMUSCULAR; INTRAVENOUS EVERY 4 HOURS PRN
Status: DISCONTINUED | OUTPATIENT
Start: 2024-08-29 | End: 2024-08-29

## 2024-08-29 RX ORDER — CYCLOBENZAPRINE HCL 10 MG
10 TABLET ORAL NIGHTLY PRN
COMMUNITY

## 2024-08-29 RX ORDER — ONDANSETRON HYDROCHLORIDE 2 MG/ML
4 INJECTION, SOLUTION INTRAVENOUS ONCE
Status: COMPLETED | OUTPATIENT
Start: 2024-08-29 | End: 2024-08-29

## 2024-08-29 RX ORDER — HYDROMORPHONE HYDROCHLORIDE 1 MG/ML
0.6 INJECTION, SOLUTION INTRAMUSCULAR; INTRAVENOUS; SUBCUTANEOUS EVERY 4 HOURS PRN
Status: DISCONTINUED | OUTPATIENT
Start: 2024-08-29 | End: 2024-09-09 | Stop reason: HOSPADM

## 2024-08-29 RX ORDER — MORPHINE SULFATE 4 MG/ML
4 INJECTION, SOLUTION INTRAMUSCULAR; INTRAVENOUS ONCE
Status: COMPLETED | OUTPATIENT
Start: 2024-08-29 | End: 2024-08-29

## 2024-08-29 SDOH — SOCIAL STABILITY: SOCIAL INSECURITY: HAVE YOU HAD ANY THOUGHTS OF HARMING ANYONE ELSE?: NO

## 2024-08-29 SDOH — SOCIAL STABILITY: SOCIAL INSECURITY: HAVE YOU HAD THOUGHTS OF HARMING ANYONE ELSE?: NO

## 2024-08-29 SDOH — SOCIAL STABILITY: SOCIAL INSECURITY: DO YOU FEEL UNSAFE GOING BACK TO THE PLACE WHERE YOU ARE LIVING?: NO

## 2024-08-29 SDOH — SOCIAL STABILITY: SOCIAL INSECURITY: ABUSE: ADULT

## 2024-08-29 SDOH — SOCIAL STABILITY: SOCIAL INSECURITY: DO YOU FEEL ANYONE HAS EXPLOITED OR TAKEN ADVANTAGE OF YOU FINANCIALLY OR OF YOUR PERSONAL PROPERTY?: NO

## 2024-08-29 SDOH — SOCIAL STABILITY: SOCIAL INSECURITY: DOES ANYONE TRY TO KEEP YOU FROM HAVING/CONTACTING OTHER FRIENDS OR DOING THINGS OUTSIDE YOUR HOME?: NO

## 2024-08-29 SDOH — SOCIAL STABILITY: SOCIAL INSECURITY: ARE THERE ANY APPARENT SIGNS OF INJURIES/BEHAVIORS THAT COULD BE RELATED TO ABUSE/NEGLECT?: NO

## 2024-08-29 SDOH — SOCIAL STABILITY: SOCIAL INSECURITY: HAS ANYONE EVER THREATENED TO HURT YOUR FAMILY OR YOUR PETS?: NO

## 2024-08-29 SDOH — SOCIAL STABILITY: SOCIAL INSECURITY: ARE YOU OR HAVE YOU BEEN THREATENED OR ABUSED PHYSICALLY, EMOTIONALLY, OR SEXUALLY BY ANYONE?: NO

## 2024-08-29 SDOH — SOCIAL STABILITY: SOCIAL INSECURITY: WERE YOU ABLE TO COMPLETE ALL THE BEHAVIORAL HEALTH SCREENINGS?: YES

## 2024-08-29 ASSESSMENT — ACTIVITIES OF DAILY LIVING (ADL)
LACK_OF_TRANSPORTATION: NO
LACK_OF_TRANSPORTATION: PATIENT DECLINED
FEEDING YOURSELF: NEEDS ASSISTANCE
HEARING - LEFT EAR: DIFFICULTY WITH NOISE
TOILETING: NEEDS ASSISTANCE
ADEQUATE_TO_COMPLETE_ADL: YES
HEARING - RIGHT EAR: DIFFICULTY WITH NOISE
DRESSING YOURSELF: NEEDS ASSISTANCE
BATHING: NEEDS ASSISTANCE
GROOMING: NEEDS ASSISTANCE
WALKS IN HOME: NEEDS ASSISTANCE
JUDGMENT_ADEQUATE_SAFELY_COMPLETE_DAILY_ACTIVITIES: YES
PATIENT'S MEMORY ADEQUATE TO SAFELY COMPLETE DAILY ACTIVITIES?: YES

## 2024-08-29 ASSESSMENT — COGNITIVE AND FUNCTIONAL STATUS - GENERAL
HELP NEEDED FOR BATHING: A LITTLE
DRESSING REGULAR LOWER BODY CLOTHING: A LITTLE
PATIENT BASELINE BEDBOUND: NO
MOVING TO AND FROM BED TO CHAIR: A LOT
TOILETING: A LITTLE
WALKING IN HOSPITAL ROOM: A LOT
PERSONAL GROOMING: A LITTLE
TURNING FROM BACK TO SIDE WHILE IN FLAT BAD: A LITTLE
STANDING UP FROM CHAIR USING ARMS: A LOT
MOVING TO AND FROM BED TO CHAIR: A LOT
EATING MEALS: A LITTLE
CLIMB 3 TO 5 STEPS WITH RAILING: TOTAL
DRESSING REGULAR LOWER BODY CLOTHING: A LITTLE
STANDING UP FROM CHAIR USING ARMS: A LOT
MOVING FROM LYING ON BACK TO SITTING ON SIDE OF FLAT BED WITH BEDRAILS: A LITTLE
CLIMB 3 TO 5 STEPS WITH RAILING: TOTAL
HELP NEEDED FOR BATHING: A LITTLE
TURNING FROM BACK TO SIDE WHILE IN FLAT BAD: A LITTLE
WALKING IN HOSPITAL ROOM: A LOT
MOVING FROM LYING ON BACK TO SITTING ON SIDE OF FLAT BED WITH BEDRAILS: A LITTLE
EATING MEALS: A LITTLE
DRESSING REGULAR UPPER BODY CLOTHING: A LITTLE
DRESSING REGULAR UPPER BODY CLOTHING: A LITTLE
DAILY ACTIVITIY SCORE: 18
DAILY ACTIVITIY SCORE: 18
TOILETING: A LITTLE
MOBILITY SCORE: 13
MOBILITY SCORE: 13
PERSONAL GROOMING: A LITTLE

## 2024-08-29 ASSESSMENT — PAIN SCALES - GENERAL
PAINLEVEL_OUTOF10: 0 - NO PAIN
PAINLEVEL_OUTOF10: 0 - NO PAIN
PAINLEVEL_OUTOF10: 10 - WORST POSSIBLE PAIN
PAINLEVEL_OUTOF10: 10 - WORST POSSIBLE PAIN
PAINLEVEL_OUTOF10: 7
PAINLEVEL_OUTOF10: 6
PAINLEVEL_OUTOF10: 7
PAINLEVEL_OUTOF10: 8
PAINLEVEL_OUTOF10: 5 - MODERATE PAIN
PAINLEVEL_OUTOF10: 3
PAINLEVEL_OUTOF10: 10 - WORST POSSIBLE PAIN

## 2024-08-29 ASSESSMENT — LIFESTYLE VARIABLES
HAVE YOU EVER FELT YOU SHOULD CUT DOWN ON YOUR DRINKING: NO
EVER FELT BAD OR GUILTY ABOUT YOUR DRINKING: NO
EVER HAD A DRINK FIRST THING IN THE MORNING TO STEADY YOUR NERVES TO GET RID OF A HANGOVER: NO
HOW MANY STANDARD DRINKS CONTAINING ALCOHOL DO YOU HAVE ON A TYPICAL DAY: PATIENT DECLINED
TOTAL SCORE: 0
SKIP TO QUESTIONS 9-10: 0
AUDIT-C TOTAL SCORE: -1
AUDIT-C TOTAL SCORE: -1
HOW OFTEN DO YOU HAVE A DRINK CONTAINING ALCOHOL: PATIENT DECLINED
HAVE PEOPLE ANNOYED YOU BY CRITICIZING YOUR DRINKING: NO
HOW OFTEN DO YOU HAVE 6 OR MORE DRINKS ON ONE OCCASION: PATIENT DECLINED

## 2024-08-29 ASSESSMENT — PAIN DESCRIPTION - LOCATION
LOCATION: KNEE
LOCATION: KNEE

## 2024-08-29 ASSESSMENT — ENCOUNTER SYMPTOMS
ABDOMINAL PAIN: 0
CHEST TIGHTNESS: 0
TROUBLE SWALLOWING: 0
EYES NEGATIVE: 1
SHORTNESS OF BREATH: 0
ARTHRALGIAS: 1
DYSURIA: 0
DIFFICULTY URINATING: 0
ABDOMINAL DISTENTION: 0
COUGH: 0
VOICE CHANGE: 0
ENDOCRINE NEGATIVE: 1
JOINT SWELLING: 1
ACTIVITY CHANGE: 0
FEVER: 0
PSYCHIATRIC NEGATIVE: 1
NAUSEA: 0
VOMITING: 0
DIARRHEA: 0

## 2024-08-29 ASSESSMENT — PATIENT HEALTH QUESTIONNAIRE - PHQ9
1. LITTLE INTEREST OR PLEASURE IN DOING THINGS: NOT AT ALL
SUM OF ALL RESPONSES TO PHQ9 QUESTIONS 1 & 2: 0
2. FEELING DOWN, DEPRESSED OR HOPELESS: NOT AT ALL

## 2024-08-29 ASSESSMENT — PAIN - FUNCTIONAL ASSESSMENT
PAIN_FUNCTIONAL_ASSESSMENT: 0-10

## 2024-08-29 ASSESSMENT — PAIN DESCRIPTION - ORIENTATION
ORIENTATION: LEFT
ORIENTATION: LEFT

## 2024-08-29 ASSESSMENT — COLUMBIA-SUICIDE SEVERITY RATING SCALE - C-SSRS
6. HAVE YOU EVER DONE ANYTHING, STARTED TO DO ANYTHING, OR PREPARED TO DO ANYTHING TO END YOUR LIFE?: NO
2. HAVE YOU ACTUALLY HAD ANY THOUGHTS OF KILLING YOURSELF?: NO
1. IN THE PAST MONTH, HAVE YOU WISHED YOU WERE DEAD OR WISHED YOU COULD GO TO SLEEP AND NOT WAKE UP?: NO

## 2024-08-29 ASSESSMENT — PAIN DESCRIPTION - DESCRIPTORS
DESCRIPTORS: ACHING;DISCOMFORT
DESCRIPTORS: DISCOMFORT
DESCRIPTORS: ACHING
DESCRIPTORS: ACHING;DISCOMFORT

## 2024-08-29 ASSESSMENT — PAIN DESCRIPTION - PAIN TYPE: TYPE: ACUTE PAIN

## 2024-08-29 NOTE — CONSULTS
Inpatient consult to Orthopaedic Surgery  Consult performed by: Partha Hall, APRN-CNP  Consult ordered by: Kojo Jasso MD  Reason for consult: Left knee pain          Consult Note  Patient: Sommer Farmer  Unit/Bed: 806/806-A  YOB: 1933  MRN: [id number]  Acct: [id number]   Admitting Diagnosis: Cellulitis of left lower extremity [L03.116]  Hemarthrosis [M25.00]  Sepsis without acute organ dysfunction, due to unspecified organism (Multi) [A41.9]  Date:  8/29/2024  Hospital Day: 0    Complaint:  Left knee pain     History of Present Illness:  Sommer Farmer is a 89 year old female patient per chart review with 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 Orthopedics was consulted for left knee hemarthrosis for evaluation and treatment recommendations.     Patient has had intermittent left knee pain for the past month from a fall. She reports her fall caused a hematoma to her left thigh that has gone into her knee joint because of her anticoagulants.     PMHx:  History reviewed. No pertinent past medical history.    PSHx:  Past Surgical History:   Procedure Laterality Date    CT ABDOMEN PELVIS ANGIOGRAM W AND/OR WO IV CONTRAST  9/8/2019    CT ABDOMEN PELVIS ANGIOGRAM W AND/OR WO IV CONTRAST [facility]       Social Hx:  Social History     Socioeconomic History    Marital status:    Tobacco Use    Smoking status: Never    Smokeless tobacco: Never   Substance and Sexual Activity    Alcohol use: Never    Drug use: Never     Social Determinants of Health     Financial Resource Strain: Low Risk  (8/29/2024)    Overall Financial Resource Strain (CARDIA)     Difficulty of Paying Living Expenses: Not hard at all   Food Insecurity: Patient Declined (7/19/2024)    Received from [facility]    Hunger Vital Sign     Worried About Running Out of Food in the Last Year: Patient  declined     Ran Out of Food in the Last Year: Patient declined   Transportation Needs: Patient Declined (8/29/2024)    PRAPARE - Transportation     Lack of Transportation (Medical): Patient declined     Lack of Transportation (Non-Medical): Patient declined   Physical Activity: Patient Declined (7/19/2024)    Received from [facility]    Exercise Vital Sign     Days of Exercise per Week: Patient declined     Minutes of Exercise per Session: Patient declined   Stress: Patient Declined (1/19/2024)    Received from [facility]    [employer] - Occupational Stress Questionnaire     Feeling of Stress : Patient declined   Social Connections: Unknown (7/19/2024)    Received from [facility]    Social Connection and Isolation Panel [NHANES]     Frequency of Communication with Friends and Family: More than three times a week     Frequency of Social Gatherings with Friends and Family: Patient declined     Attends Sikhism Services: Patient declined     Active Member of Clubs or Organizations: Yes     Attends Club or Organization Meetings: More than 4 times per year     Marital Status: Patient declined   Housing Stability: Patient Declined (8/29/2024)    Housing Stability Vital Sign     Unable to Pay for Housing in the Last Year: Patient declined     Number of Times Moved in the Last Year: 1     Homeless in the Last Year: Patient declined       Family Hx:  No family history on file.    Review of Systems:   Review of Systems   Constitutional:  Negative for activity change and fever.   HENT:  Negative for congestion, ear pain, trouble swallowing and voice change.    Eyes: Negative.    Respiratory:  Negative for cough, chest tightness and shortness of breath.    Cardiovascular:  Negative for chest pain.   Gastrointestinal:  Negative for abdominal distention, abdominal pain, diarrhea, nausea and vomiting.   Endocrine: Negative.    Genitourinary:  Negative for difficulty urinating and  dysuria.   Musculoskeletal:  Positive for arthralgias, gait problem and joint swelling.   Skin: Negative.    Psychiatric/Behavioral: Negative.     All other systems reviewed and are negative.        Physical Examination:    Visit Vitals  BP (!) 202/87   Pulse (!) 109   Temp 36.8 °C (98.2 °F)   Resp 19      Physical Exam  Vitals and nursing note reviewed.   Constitutional:       General: She is not in acute distress.     Appearance: Normal appearance.   HENT:      Head: Normocephalic.      Nose: Nose normal.      Mouth/Throat:      Mouth: Mucous membranes are moist.   Eyes:      Extraocular Movements: Extraocular movements intact.      Pupils: Pupils are equal, round, and reactive to light.   Cardiovascular:      Rate and Rhythm: Normal rate and regular rhythm.      Pulses: Normal pulses.      Heart sounds: Normal heart sounds.   Pulmonary:      Effort: Pulmonary effort is normal.      Breath sounds: Normal breath sounds.   Abdominal:      General: Bowel sounds are normal.      Palpations: Abdomen is soft.   Musculoskeletal:         General: Swelling and tenderness present.      Cervical back: Normal range of motion. No rigidity or tenderness.   Skin:     General: Skin is warm.      Capillary Refill: Capillary refill takes less than 2 seconds.      Findings: Bruising present.   Neurological:      General: No focal deficit present.      Mental Status: She is alert and oriented to person, place, and time.   Psychiatric:         Mood and Affect: Mood normal.         LABS:  CBC:   Lab Results   Component Value Date    WBC 14.7 (H) 08/29/2024    RBC 3.52 (L) 08/29/2024    HGB 10.5 (L) 08/29/2024    HCT 31.0 (L) 08/29/2024    MCV 88 08/29/2024    MCH 29.8 08/29/2024    MCHC 33.9 08/29/2024    RDW 12.8 08/29/2024     08/29/2024     CBC with Differential:    Lab Results   Component Value Date    WBC 14.7 (H) 08/29/2024    RBC 3.52 (L) 08/29/2024    HGB 10.5 (L) 08/29/2024    HCT 31.0 (L) 08/29/2024      "08/29/2024    MCV 88 08/29/2024    MCH 29.8 08/29/2024    MCHC 33.9 08/29/2024    RDW 12.8 08/29/2024    NRBC 0.0 08/29/2024    LYMPHOPCT 9.0 08/29/2024    MONOPCT 5.5 08/29/2024    EOSPCT 2.4 08/29/2024    BASOPCT 0.6 08/29/2024    MONOSABS 0.81 (H) 08/29/2024    LYMPHSABS 1.33 08/29/2024    EOSABS 0.35 08/29/2024    BASOSABS 0.09 08/29/2024     CMP:    Lab Results   Component Value Date     (L) 08/29/2024    K 4.1 08/29/2024    CL 96 (L) 08/29/2024    CO2 22 08/29/2024    BUN 25 (H) 08/29/2024    CREATININE 1.13 (H) 08/29/2024    GLUCOSE 112 (H) 08/29/2024    PROT 6.8 08/29/2024    CALCIUM 9.3 08/29/2024    BILITOT 0.7 08/29/2024    ALKPHOS 71 08/29/2024    AST 22 08/29/2024    ALT 8 08/29/2024     BMP:    Lab Results   Component Value Date     (L) 08/29/2024    K 4.1 08/29/2024    CL 96 (L) 08/29/2024    CO2 22 08/29/2024    BUN 25 (H) 08/29/2024    CREATININE 1.13 (H) 08/29/2024    CALCIUM 9.3 08/29/2024    GLUCOSE 112 (H) 08/29/2024     Magnesium:No results found for: \"MG\"  Troponin:  No results found for: \"TROPONINI\"    Imaging   XR knee left 4+ views    Result Date: 8/29/2024  Interpreted By:  Antonia Rucker, STUDY: XR KNEE LEFT 4+ VIEWS; ;  8/29/2024 3:03 am   INDICATION: Signs/Symptoms:pain, edema left knee.   COMPARISON: 08/04/2024, 11/10/2011   ACCESSION NUMBER(S): [id number]   ORDERING CLINICIAN: KEVIN GOLDEN   FINDINGS: Four views left knee. Chronic irregularity of the articular surface of the patella and lateral femoral condyle. Very large knee joint effusion, similar to prior. Alignment of the knee is within normal limits. Atherosclerotic vascular calcifications are noted.       No acute fracture or malalignment.   Chronic irregularity of the articular surface of the patella and lateral femoral condyle, likely related to old osteochondral injury and superimposed degenerative changes.   Grossly stable very large joint effusion.   MACRO: None   Signed by: Antonia Rucker 8/29/2024 3:40 AM " Dictation workstation:   [id number]    CT angio lower extremity left w and or wo IV contrast    Result Date: 8/29/2024  Interpreted By:  Antonia Rucker, STUDY: CT ANGIO LOWER EXTREMITY LEFT W AND OR WO IV CONTRAST;  8/29/2024 2:57 am   INDICATION: Signs/Symptoms:left knee/leg edema, hx of hematoma.   COMPARISON: CT left knee 08/05/2024   ACCESSION NUMBER(S): [id number]   ORDERING CLINICIAN: KEVIN GOLDEN   TECHNIQUE: Thin-section postcontrast axial images from above the aortic bifurcation through the left lower extremity. Sagittal and coronal reconstructions. Maximum intensity projection images were obtained at a separate workstation.   FINDINGS: Vascular: The aortic bifurcation is unremarkable. No significant stenosis of the imaged right common, internal and external iliac arteries. The right common femoral artery demonstrates mild calcification with no significant stenosis. The right profunda femoris is patent with no significant stenosis. Calcification with areas of mild stenosis of the distal right femoral and popliteal arteries.   Left lower extremity: Common iliac artery: Patent. No hemodynamically significant stenosis. Internal iliac artery: Patent. No hemodynamically significant stenosis. External iliac artery: Patent. No hemodynamically significant stenosis. Common femoral artery: Patent. No hemodynamically significant stenosis. Profunda femoris artery: Patent. No hemodynamically significant stenosis. Femoral artery: Patent. Mild distal stenosis. Popliteal artery: Calcifications with severe stenosis at the level of the femoral condyles. Tibioperoneal vessels: Limited evaluation of the tibioperoneal vessels due to dense calcification. The distal anterior tibial artery and dorsalis pedis artery are grossly patent. No definite opacification of the posterior tibial artery or plantar branches.   Grossly stable large complex, heterogeneous left knee joint effusion suggestive of hemarthrosis. No evidence of  active hemorrhage. No arterial pseudoaneurysm seen. No acute osseous abnormality. Moderate left knee osteoarthrosis. Nonspecific subcutaneous edema from the mid left leg through the foot. Fatty atrophy of the left medial soleus. Right tibial intramedullary nail noted.   Imaged intrapelvic structures are remarkable for a pessary in the vagina. There is central hypodensity in the uterus measuring 1.5 cm. Rectal anastomosis noted. Fat containing midline ventral hernia.       No evidence of arterial vascular injury of the left lower extremity.   Large left knee hemarthrosis, not significantly changed from 08/05/2024. No evidence of acute osseous abnormality.   Single vessel left lower extremity runoff, with patency of the dorsalis pedis artery. Severe stenosis of the left popliteal artery.   Nonspecific left lower extremity subcutaneous edema.   Focal hypodensity in the central uterus suspicious for endometrial thickening fluid. Evaluation follow-up nonemergent pelvic ultrasound is recommended.   MACRO: Critical Finding:  See findings. Notification was initiated on 8/29/2024 at 3:32 am by  Antonia Rucker.  (**-YCF-**) Instructions:   Signed by: Antonia Rucker 8/29/2024 3:33 AM Dictation workstation:   [id number]    Lower extremity venous duplex left    Result Date: 8/29/2024  Interpreted By:  Shannon Rascon, STUDY: [facility] US LOWER EXTREMITY VENOUS DUPLEX LEFT;  8/29/2024 2:39 am   INDICATION: Signs/Symptoms:leg edema, erythema, hx of clots.   COMPARISON: Venous Doppler ultrasound left lower extremity 06/21/2015.   ACCESSION NUMBER(S): [id number]   ORDERING CLINICIAN: KEVIN GOLDEN   TECHNIQUE: Vascular ultrasound of the  left lower extremity was performed. Real time compression views as well as Gray scale, color Doppler and spectral Doppler waveform analysis was performed.   FINDINGS: Evaluation of the visualized portions of the  left common femoral vein, proximal, mid, and distal femoral vein, and popliteal vein  were performed.  Evaluation of the visualized portions of the  posterior tibial and peroneal veins were also performed.  In addition, evaluation of the contralateral common femoral vein was performed.   Limitations:  There is suboptimal visualization of the calf veins due to soft tissue edema and patient's body habitus.   The evaluated veins demonstrate normal compressibility. There is intact venous flow demonstrating normal respiratory variability and normal augmentation of flow with calf compression. Therefore, there is no ultrasonographic evidence for deep vein thrombosis within the evaluated veins.       1.  No sonographic evidence for deep vein thrombosis within the evaluated veins of the left lower extremity from the inguinal region to the popliteal region. Suboptimal visualization of the left calf veins on this exam.     MACRO: None.   Signed by: Shannon Rascon 8/29/2024 3:09 AM Dictation workstation:   [id number]    US gallbladder    Result Date: 8/26/2024  * * *Final Report* * * DATE OF EXAM: Aug 26 2024 12:37PM   VHU   1032  -  US ABD RIGHT UPPER QUADRANT  / ACCESSION #  [id number] PROCEDURE REASON: RUQ pain, fever, elev WBC, Eaton's sign      * * * * Physician Interpretation * * * *  EXAMINATION:   RIGHT UPPER QUADRANT ULTRASOUND CLINICAL HISTORY: Right upper quadrant pain. TECHNIQUE:  Sonography of the right upper quadrant  was performed.   Images were obtained and stored in a permanent archive. MQ:  URUQ_2 COMPARISON: Same day CT, 08/26/2024 RESULT: Pancreas: 8 mm cystic lesion in the neck    Portions obscured: tail Liver:      Echotexture:  Normal, homogeneous.      Echogenicity:  Normal      Surface contour:  Smooth      Lesions:  Calcification the posterior right lobe.  8 mm cyst in the left lobe. Biliary: No intrahepatic biliary duct dilation.      CBD: 0.5 cm at the hilum.      Gallbladder: Normal caliber           -Contents:  No cholelithiasis           -Wall:  Normal           -Other:  No  pericholecystic fluid or sonographic Eaton's sign. Right Kidney: No hydronephrosis. Ascites: None.    IMPRESSION: No sonographic findings of acute cholecystitis. 8 mm cystic lesion in the pancreatic neck, likely a side branch IPMN. : PSCJAMIR   Transcribe Date/Time: Aug 26 2024  1:35P Dictated by : KERMIT CARRILLO DO This examination was interpreted and the report reviewed and electronically signed by: KERMIT CARRILLO DO on Aug 26 2024  1:41PM  EST    CT abdomen pelvis w IV contrast    Result Date: 8/26/2024  * * *Final Report* * * DATE OF EXAM: Aug 26 2024 11:57AM   [facility]   0530  -  CT ABD/PEL W IVCON  / ACCESSION #  [id number] PROCEDURE REASON: RUQ pain, no fever, no elev WBC, US shows gallstones only      * * * * Physician Interpretation * * * *  EXAMINATION:  CT ABDOMEN AND PELVIS WITH IV CONTRAST CLINICAL HISTORY: Right upper quadrant pain. TECHNIQUE: CT of the abdomen and pelvis was performed using standard technique, scanning from just above the dome of the diaphragm to the symphysis pubis. MQ:  CTAP_3 Contrast: IV:  100 ml of Omnipaque 350 CT Radiation dose: Integrated Dose-length product (DLP) for this visit =   348 mGy*cm. CT Dose Reduction Employed: Automated exposure control(AEC) and iterative recon COMPARISON: CT 08/27/2014 and same day ultrasound, 08/26/2024 RESULT: Liver: 1 cm cyst in the left lobe (2:32).  Dense calcification the posterior right lobe. Biliary: No bile duct dilation.  Gallbladder is unremarkable. Spleen: No mass. No splenomegaly. Pancreas: No mass or duct dilation. Adrenals: No mass. Kidneys: No calculus or hydronephrosis.  Subcentimeter low-attenuation lesions which are too small to characterize but likely benign.  Exophytic 2.3 cm lesion off the left interpolar region measuring above water attenuation (66 HU), present on CT from 2014 and likely a proteinaceous/hemorrhagic cyst with thin peripheral calcification along the posteroinferior aspect (602:101) GI tract: No  dilation or wall thickening. Colorectal anastomosis.  Small hiatal hernia. Lymph nodes: No abdominal or pelvic lymphadenopathy. Mesentery/Peritoneum: No ascites or mass. Retroperitoneum: No mass. Vasculature:  - Abdominal aorta and iliac arteries: Atherosclerotic calcifications without aneurysm.  - Celiac and SMA: Atherosclerotic calcifications at the origins.  - Portal venous system (SMV, splenic vein, portal vein and branches): Patent.  - Hepatic veins: Patent. Pelvis: Thickening of the endometrial canal measuring approximately 1.7 cm.  Pessary in place. Bones/Soft Tissues: Fat-containing periumbilical hernia.  Degenerative changes. Lower thorax: Aortic leaflet calcifications.  Bibasilar scarring/atelectasis. Localizer images: No additional findings.    IMPRESSION: No acute process in the abdomen/pelvis. Endometrial thickening, indeterminate and follow-up with nonemergent pelvic ultrasound/gynecology is recommended. ACTIONABLE RESULT: FOLLOW-UP Acuity: Actionable Findings: Female reproductive tract (pelvis, adnexa) Routing code:  WH_1 Recommendation: Unlisted Recommendation (see report) Time Frame: At the discretion of the clinical team. COMMUNICATION: Results will be communicated with the ordering provider via Epic staff message or phone message by Imaging Support Services within 2 business days of report finalization. --END OF FINDING-- : PSCB   Transcribe Date/Time: Aug 26 2024  1:10P Dictated by : KERMIT CARRILLO DO This examination was interpreted and the report reviewed and electronically signed by: KERMIT CARRILLO DO on Aug 26 2024  1:34PM  EST    XR femur left 2+ views    Result Date: 8/10/2024  * * *Final Report* * * DATE OF EXAM: Aug  6 2024  1:47PM   SVX   5332  -  XR FEMUR 2V AP/LAT LT  / ACCESSION #  [id number] PROCEDURE REASON: Knee injury, left, subsequent encounter      * * * * Physician Interpretation * * * *  HISTORY:  Left knee pain, unspecified chronicity TECHNIQUE:  4 views  left knee.  4 views left femur. COMPARISON:  Knee radiographs 07/01/2024 RESULT: There is no acute fracture.  Left femur is intact.  Left hip joint space appears preserved.  There is lesser trochanteric enthesopathy. There is severe patellofemoral compartment narrowing and mild lateral compartment narrowing with tricompartmental marginal osteophytes from degenerative change at the knee.  Small suprapatellar joint effusion.   Vascular calcifications are present. Degenerative change of the right knee severe in the patellofemoral compartment and partially seen tibial intramedullary nail appears unchanged.    IMPRESSION: NO ACUTE BONY ABNORMALITY TRICOMPARTMENTAL DEGENERATIVE CHANGE OF THE LEFT KNEE SEVERE IN THE PATELLOFEMORAL COMPARTMENT : [facility]   Transcribe Date/Time: Aug 10 2024  4:50P Dictated by : ARELI MATUTE MD This examination was interpreted and the report reviewed and electronically signed by: ARELI MATUTE MD on Aug 10 2024  7:32PM  EST    XR knee left 4+ views    Result Date: 8/10/2024  * * *Final Report* * * DATE OF EXAM: Aug  6 2024  1:47PM   SVX   5202  -  XR KNEE 4V AP/PA BOTH+LAT/GANESH LT  / ACCESSION #  [id number] PROCEDURE REASON: Left knee pain, unspecified chronicity      * * * * Physician Interpretation * * * *  HISTORY:  Left knee pain, unspecified chronicity TECHNIQUE:  4 views left knee.  4 views left femur. COMPARISON:  Knee radiographs 07/01/2024 RESULT: There is no acute fracture.  Left femur is intact.  Left hip joint space appears preserved.  There is lesser trochanteric enthesopathy. There is severe patellofemoral compartment narrowing and mild lateral compartment narrowing with tricompartmental marginal osteophytes from degenerative change at the knee.  Small suprapatellar joint effusion.   Vascular calcifications are present. Degenerative change of the right knee severe in the patellofemoral compartment and partially seen tibial intramedullary nail appears  unchanged.    IMPRESSION: NO ACUTE BONY ABNORMALITY TRICOMPARTMENTAL DEGENERATIVE CHANGE OF THE LEFT KNEE SEVERE IN THE PATELLOFEMORAL COMPARTMENT : PSCB   Transcribe Date/Time: Aug 10 2024  4:50P Dictated by : ARELI MATUTE MD This examination was interpreted and the report reviewed and electronically signed by: ARELI MATUTE MD on Aug 10 2024  7:32PM  EST    ECG 12 lead    Result Date: 8/5/2024  Sinus rhythm with 1st degree AV block Left ventricular hypertrophy with repolarization abnormality Cannot rule out Septal infarct , age undetermined Abnormal ECG When compared with ECG of 09-OCT-2018 13:05, UT interval has increased Minimal criteria for Septal infarct are now Present See ED provider note for full interpretation and clinical correlation Confirmed by Kasey Huerta ([id number]) on 8/5/2024 3:08:38 PM    CT knee left wo IV contrast    Result Date: 8/5/2024  Interpreted By:  Tam Gutiérrez, STUDY: CT KNEE LEFT WO IV CONTRAST;  8/5/2024 2:58 am   INDICATION: Signs/Symptoms:fall, effusion, leg pain.   COMPARISON: None.   ACCESSION NUMBER(S): [id number]   ORDERING CLINICIAN: BONNIE CONTRERAS   TECHNIQUE: Contiguous axial images of the left knee were obtained without intravenous contrast. Coronal and sagittal reformatted images were obtained of the axial images.   FINDINGS: No definite evidence of acute displaced fracture of the left knee. There is left knee osteoarthrosis with narrowing of the medial and lateral compartment and marginal osseous spurring. Osseous spurring at the tibial spine. There is also patellofemoral osteoarthrosis. There is large knee effusion with internal hyperdensity compatible with hemorrhage.       No definite evidence of acute displaced left knee fracture.   Left knee osteoarthrosis.   Large hemorrhagic left knee effusion; orthopedic consultation/evaluation is recommended for further assessment given the unexplained large hemorrhagic effusion.   MACRO: None   Signed  by: Tam Gutiérrez 8/5/2024 3:36 AM Dictation workstation:   [id number]    CT hip left wo IV contrast    Result Date: 8/5/2024  Interpreted By:  Tam Gutiérrez, STUDY: CT HIP LEFT WO IV CONTRAST;  8/5/2024 2:58 am   INDICATION: Signs/Symptoms:difficulty ambulating recent fall leg pain.   COMPARISON: None.   ACCESSION NUMBER(S): [id number]   ORDERING CLINICIAN: BONNIE CONTRERAS   TECHNIQUE: Contiguous images of the left hip were obtained without intravenous contrast. Coronal and sagittal reformatted images were obtained from the axial images.   FINDINGS: No evidence of acute displaced left hip fracture. There is mild left hip osteoarthrosis. There is osteopenia. Degenerative change of the imaged lower lumbar spine.       No evidence of acute displaced left hip fracture.   MACRO: None   Signed by: Tam Gutiérrez 8/5/2024 3:18 AM Dictation workstation:   [id number]    XR hip left with pelvis when performed 2 or 3 views    Result Date: 8/5/2024  Interpreted By:  Bear Roe, STUDY: XR HIP LEFT WITH PELVIS WHEN PERFORMED 2 OR 3 VIEWS; ;  8/4/2024 11:47 pm   INDICATION: Signs/Symptoms:left leg pain, fall.   COMPARISON: None.   ACCESSION NUMBER(S): [id number]   ORDERING CLINICIAN: BONNIE CONTRERAS   FINDINGS: AP radiograph of the pelvis and two views of the left hip: Mild bilateral hip joint space narrowing. There is diffuse osteopenia. Rectal anastomotic suture material. Rounded ossific density projected over the right femoral neck, likely gluteal injection granuloma. Pessary in place.       1. No acute osseous abnormality. Diffuse osteopenia limits evaluation for subtle fractures.   Signed by: Bear Roe 8/5/2024 12:58 AM Dictation workstation:   [id number]    XR knee left 4+ views    Result Date: 8/5/2024  Interpreted By:  Bear Roe, STUDY: XR KNEE LEFT 4+ VIEWS; ;  8/4/2024 11:47 pm   INDICATION: Signs/Symptoms:knee pain and swelling, fall.   COMPARISON: 10/11/2011   ACCESSION NUMBER(S):  [id number]   ORDERING CLINICIAN: BONNIE CONTRERAS   FINDINGS: Large knee joint effusion. Vascular calcifications. There is mild medial compartment joint space narrowing. Mild tricompartment marginal osteophyte formation. No acute fracture or dislocation. Diffuse osteopenia. Soft tissue swelling about the lower leg.       1. No acute fracture detected. If there is concern for radiographically occult fracture, consider CT. 2. Large knee joint effusion. 3. Mild osteoarthritis.   Signed by: Bear Roe 8/5/2024 12:56 AM Dictation workstation:   [id number]    XR chest 1 view    Result Date: 8/5/2024  Interpreted By:  Bear Roe, STUDY: XR CHEST 1 VIEW;  8/4/2024 11:47 pm   INDICATION: Signs/Symptoms:peripheral edema.   COMPARISON: 10/09/2018   ACCESSION NUMBER(S): [id number]   ORDERING CLINICIAN: BONNIE CONTRERAS   FINDINGS:     No consolidation, pleural effusion, or pneumothorax. Heart size is normal. Bilateral reticular opacities, likely fibrotic change. Mild diffuse interstitial prominence, likely interstitial edema. Diffuse osteopenia limits evaluation of the bones. No acute osseous abnormality detected. Mild right convex thoracolumbar curvature.       1. Mild diffuse interstitial prominence, likely interstitial edema. 2. Bilateral reticular opacities, likely fibrotic change.   Signed by: Bear Roe 8/5/2024 12:54 AM Dictation workstation:   [id number]    CT head wo IV contrast    Result Date: 8/5/2024  Interpreted By:  Bear Roe, STUDY: CT HEAD WO IV CONTRAST; CT CERVICAL SPINE WO IV CONTRAST;  8/4/2024 11:28 pm   INDICATION: Signs/Symptoms:fall closed head injury; Signs/Symptoms:fall.   COMPARISON: 10/09/2018   ACCESSION NUMBER(S): [id number]; [id number]   ORDERING CLINICIAN: BONNIE CONTRERAS   TECHNIQUE: Axial noncontrast CT images of the head and cervical spine. Sagittal and coronal reformats were provided.   FINDINGS: Head: BRAIN: No acute intracranial hemorrhage. No mass  effect or midline shift. Gray-white matter interfaces are preserved.Patchy white matter hypodensities which are nonspecific but likely related to microangiopathic white matter changes.   VENTRICLES and EXTRA-AXIAL SPACES: Prominent ventricles and extra-axial CSF spaces, reflecting parenchymal volume loss.   EXTRACRANIAL SOFT TISSUES:  Within normal limits.   PARANASAL SINUSES/MASTOIDS: The visualized paranasal sinuses and mastoid air cells are aerated.   BONES AND ORBITS: No displaced skull fracture. Status post bilateral lens replacement.   OTHER FINDINGS: None.     Cervical Spine:   FRACTURES: There is no evidence for an acute fracture of the cervical spine.   VERTEBRAL ALIGNMENT: Within normal limits.   CRANIOCERVICAL JUNCTION: Within normal limits.   VERTEBRA/DISC SPACES: Moderate multilevel disc space narrowing, endplate osteophytes, and facet/uncovertebral arthropathy. There is osseous fusion across the C2-C4 levels. There is mild spinal canal stenosis at C5-C6 and C6-C7 secondary to posterior disc osteophyte complex. Uncovertebral and facet arthropathy contribute to moderate left C5-C6 and C6-C7 neural foraminal stenosis..   SOFT TISSUES: Within normal limits.   UPPER CHEST: Biapical pleuroparenchymal scarring.       1. No acute intracranial hemorrhage or mass effect. 2. No acute fracture or traumatic malalignment of the cervical spine.   Signed by: Bear Roe 8/5/2024 12:53 AM Dictation workstation:   [id number]    CT cervical spine wo IV contrast    Result Date: 8/5/2024  Interpreted By:  Bear Roe, STUDY: CT HEAD WO IV CONTRAST; CT CERVICAL SPINE WO IV CONTRAST;  8/4/2024 11:28 pm   INDICATION: Signs/Symptoms:fall closed head injury; Signs/Symptoms:fall.   COMPARISON: 10/09/2018   ACCESSION NUMBER(S): [id number]; [id number]   ORDERING CLINICIAN: BONNIE CONTRERAS   TECHNIQUE: Axial noncontrast CT images of the head and cervical spine. Sagittal and coronal reformats were provided.    FINDINGS: Head: BRAIN: No acute intracranial hemorrhage. No mass effect or midline shift. Gray-white matter interfaces are preserved.Patchy white matter hypodensities which are nonspecific but likely related to microangiopathic white matter changes.   VENTRICLES and EXTRA-AXIAL SPACES: Prominent ventricles and extra-axial CSF spaces, reflecting parenchymal volume loss.   EXTRACRANIAL SOFT TISSUES:  Within normal limits.   PARANASAL SINUSES/MASTOIDS: The visualized paranasal sinuses and mastoid air cells are aerated.   BONES AND ORBITS: No displaced skull fracture. Status post bilateral lens replacement.   OTHER FINDINGS: None.     Cervical Spine:   FRACTURES: There is no evidence for an acute fracture of the cervical spine.   VERTEBRAL ALIGNMENT: Within normal limits.   CRANIOCERVICAL JUNCTION: Within normal limits.   VERTEBRA/DISC SPACES: Moderate multilevel disc space narrowing, endplate osteophytes, and facet/uncovertebral arthropathy. There is osseous fusion across the C2-C4 levels. There is mild spinal canal stenosis at C5-C6 and C6-C7 secondary to posterior disc osteophyte complex. Uncovertebral and facet arthropathy contribute to moderate left C5-C6 and C6-C7 neural foraminal stenosis..   SOFT TISSUES: Within normal limits.   UPPER CHEST: Biapical pleuroparenchymal scarring.       1. No acute intracranial hemorrhage or mass effect. 2. No acute fracture or traumatic malalignment of the cervical spine.   Signed by: Bear Roe 8/5/2024 12:53 AM Dictation workstation:   [id number]        Assessment:    89 year old female patient admitted for left lower extremity cellulitis, hemarthrosis and sepsis.     Imaging reviewed and negative for acute fracture of left knee, there is a stable joint effusion present.   Patient also underwent ct angiogram of left lower extremity that also shows large joint effusion most consistent with hemarthrosis that is stable in comparison to previous studies dated 8/5/24.      Upon assessment patient has pain to palpation and movement of the left knee. There is a large palpable knee effusion. She is able to flex and extend this extremity however movement is limited secondary to pain. There is no significant warmth or redness to this knee.     Further recommendations to follow     Thank you for this consultation and allowing us to be a part of this patient's care.        Plan:  WBAT to bilateral lower extremities   Continue pain control            Electronically signed by YOBANY Bliss on 8/29/2024 at 10:53 AM    In a face-to-face encounter today, I Dante Saunders MD performed a history and physical examination, discussed pertinent diagnostic studies if indicated, and discussed diagnosis and management strategies with both the patient and the midlevel provider.  I reviewed the midlevel's note and agree with the documented findings and plan of care.  Greater than 50% of the evaluation and treatment decision was performed by the physician myself during today's visit.    Patient seen and examined, agree with above    Patient has a moderate hemarthrosis in the left knee with moderate underlying DJD.  Given the chronicity of this hemarthrosis we discussed aspiration for pain relief and improved mobilization.  We discussed the risks and benefits as well as the potential for limited aspiration as the hematoma may be coagulated at this point and unable to be removed through a needle.  There is certainly the potential for recurrence of the hemarthrosis as she continues to be on anticoagulants.  She would like to consult with her family before deciding if she does feel it is gotten somewhat better over the last week.  She can weight-bear as tolerated and work on gentle range of motion with physical therapy assistance.  She can also ice aggressively.  She may benefit from a brace for stability.  We will be happy to follow the patient while in the hospital and we thank you for this  consultation.    Dante Saunders MD  Chief of Orthopedics  [facility]

## 2024-08-29 NOTE — PROGRESS NOTES
"PROGRESS NOTE    ASSESSMENT AND PLAN:      #.  Sepsis secondary to left lower extremity cellulitis  -Presented with leukocytosis and tachycardia, diffuse erythema of left lower extremity without drainage  -Continue cefazolin  -Follow-up blood cultures     #.  Left knee pain 2/2 hemarthrosis of left knee  -Likely induced by trauma while on Coumadin, Doppler negative for DVT  -CT scan shows a large left knee hemarthrosis not significantly changed from 8/5  -Orthopedic surgery consult  -Pain control     #.  Hyponatremia  -Likely hypotonic  -IV fluids     #.  History of DVT  #.  Long-term anticoagulation  -Doppler negative for DVT  -Continue home Coumadin     VTE PPX: Coumadin    Disposition: Plan to discharge pending PT OT evaluation.      SUBJECTIVE:   Admit Date: 8/29/2024    Interval History: Complains of pain, denies shortness of breath, denies fevers, chills.      OBJECTIVE:   Vitals: BP (!) 202/87   Pulse (!) 109   Temp 36.8 °C (98.2 °F)   Resp 19   Ht 1.575 m (5' 2.01\")   Wt 63 kg (139 lb)   SpO2 96%   BMI 25.42 kg/m²    Wt Readings from Last 3 Encounters:   08/29/24 63 kg (139 lb)   08/04/24 64.9 kg (143 lb)      24HR INTAKE/OUTPUT:    Intake/Output Summary (Last 24 hours) at 8/29/2024 1053  Last data filed at 8/29/2024 1035  Gross per 24 hour   Intake 190 ml   Output --   Net 190 ml       PHYSICAL EXAM:   GENERAL: Laying in bed, does not appear to be in any distress.   HEENT: HEAD: Normocephalic atraumatic.  Neck: Supple.  Eyes: Pupils are reactive to direct light.   CVS: S1, S2 heard. Regular rate and rhythm  LUNGS: Clear to auscultate bilaterally. No wheezing or rhonchi appreciated.  ABDOMEN: Soft, nontender to palpate. Positive bowel sounds. No guarding or rebound appreciated.  NEUROLOGICAL: No focal neurological deficits appreciated. Cranial nerves are grossly intact.  EXTREMITIES: Left knee edema, tenderness to palpate.  Ecchymosis appreciated over left inner thigh.  Erythema appreciated over left " "lower extremity.  SKIN:  Grossly intact, warm and dry.      LABS/IMAGING AND MEDICATIONS:   Scheduled Meds:ceFAZolin, 1 g, intravenous, q12h  polyethylene glycol, 17 g, oral, Daily      PRN Meds:PRN medications: acetaminophen **OR** acetaminophen **OR** acetaminophen, melatonin, morphine, ondansetron **OR** ondansetron    No lab exists for component: \"CBC\"   No lab exists for component: \"CMP\"   No lab exists for component: \"TROPONIN\"      Results from last 7 days   Lab Units 08/29/24  0108   INR  3.3*     No lab exists for component: \"LIPIDS\"       No lab exists for component: \"URINALYSIS\"          BMP:  Results from last 7 days   Lab Units 08/29/24 0108   SODIUM mmol/L 128*   POTASSIUM mmol/L 4.1   CHLORIDE mmol/L 96*   CO2 mmol/L 22   BUN mg/dL 25*   CREATININE mg/dL 1.13*       CBC:  Results from last 7 days   Lab Units 08/29/24 0108   WBC AUTO x10*3/uL 14.7*   RBC AUTO x10*6/uL 3.52*   HEMOGLOBIN g/dL 10.5*   HEMATOCRIT % 31.0*   MCV fL 88   MCH pg 29.8   MCHC g/dL 33.9   RDW % 12.8   PLATELETS AUTO x10*3/uL 393       Cardiac Enzymes:           Hepatic Function Panel:  Results from last 7 days   Lab Units 08/29/24 0108   ALK PHOS U/L 71   ALT U/L 8   AST U/L 22   PROTEIN TOTAL g/dL 6.8   BILIRUBIN TOTAL mg/dL 0.7       Magnesium:        Pro-BNP:  No results found for: \"PROBNP\"    INR:  Results from last 7 days   Lab Units 08/29/24  0108   PROTIME seconds 38.2*   INR  3.3*       TSH:  No results found for: \"TSH\"    Lipid Profile:        No lab exists for component: \"LABVLDL\"    HgbA1C:        Lactate Level:  No lab exists for component: \"LACTA\"    CMP:  Results from last 7 days   Lab Units 08/29/24  0108   SODIUM mmol/L 128*   POTASSIUM mmol/L 4.1   CHLORIDE mmol/L 96*   CO2 mmol/L 22   BUN mg/dL 25*   CREATININE mg/dL 1.13*   GLUCOSE mg/dL 112*   CALCIUM mg/dL 9.3   PROTEIN TOTAL g/dL 6.8   BILIRUBIN TOTAL mg/dL 0.7   ALK PHOS U/L 71   AST U/L 22   ALT U/L 8       Amylase:        Lipase:        ABG:        No " "lab exists for component: \"PO2\", \"PCO2\", \"HCO3\", \"BE\", \"O2SAT\"       "

## 2024-08-29 NOTE — CONSULTS
"Nutrition Initial Assessment:   Nutrition Assessment    Reason for Assessment: Admission nursing screening    Patient is a 89 y.o. female presenting with sepsis due to cellulitis. Family at bedside and assisted with nutrition hx.       Nutrition History:  Food and Nutrient History: Pt and family report decreased appetite since the weekend due to stomach pains when she eats. States stomach pains have been improving. Has had a lot of gas and acid reflux - states the OTC medication she takes at home helps with this. Denies N/V/D/C but sometimes has to take a stool softener to help ahve regular BMs. Does not drink any ONS at home. No issues chewing/swallowing.  Vitamin/Herbal Supplement Use: none listed in home med list  Food Allergies/Intolerances:  None  GI Symptoms: Abdominal pain and Reflux  Oral Problems: None       Anthropometrics:  Height: 157.5 cm (5' 2.01\")   Weight: 63 kg (139 lb)   BMI (Calculated): 25.42  IBW/kg (Dietitian Calculated): 50 kg          Weight History:   Wt Readings from Last 10 Encounters:   08/29/24 63 kg (139 lb)   08/04/24 64.9 kg (143 lb)   3/26/24 66.1 kg   11/7/23 65.5 kg      Weight Change %:  Weight History / % Weight Change: Pt with 3.1 kg (4.6%) wt loss x 5 months; 1.9 kg (3%) wt loss x 1 month. Pt states UBW is 152-160#.  Significant Weight Loss: No    Nutrition Focused Physical Exam Findings:    Subcutaneous Fat Loss:   Orbital Fat Pads: Well nourished (slightly bulging fat pads)  Buccal Fat Pads: Well nourished (full, rounded cheeks)  Triceps: Well nourished (ample fat tissue)  Muscle Wasting:  Temporalis: Well nourished (well-defined muscle)  Pectoralis (Clavicular Region): Mild-Moderate (some protrusion of clavicle)  Deltoid/Trapezius: Mild-Moderate (slight protrusion of acromion process)  Interosseous: Well nourished (muscle bulges)  Edema:  Edema: +1 trace  Edema Location: LLE edema per nursing assessment  Physical Findings:  Mouth: Negative  Nails: Negative  Skin: " Negative    Nutrition Significant Labs:    Reviewed   Nutrition Specific Medications:  Reviewed     I/O:    ;      Dietary Orders (From admission, onward)       Start     Ordered    08/29/24 1317  Oral nutritional supplements  Until discontinued        Comments: strawberry   Question Answer Comment   Deliver with Breakfast    Deliver with Dinner    Select supplement: Ensure Plus High Protein        08/29/24 1317    08/29/24 0919  Adult diet Regular  Diet effective now        Question:  Diet type  Answer:  Regular    08/29/24 0918                     Estimated Needs:   Total Energy Estimated Needs (kCal): 1575 kCal  Method for Estimating Needs: 25 kcal/kg ABW  Total Protein Estimated Needs (g): 50 g  Method for Estimating Needs: 0.8 g/kg ABW  Total Fluid Estimated Needs (mL): 1575 mL  Method for Estimating Needs: 1 ml/kcal or per MD        Nutrition Diagnosis   Malnutrition Diagnosis  Patient has Malnutrition Diagnosis: No    Nutrition Diagnosis  Patient has Nutrition Diagnosis: Yes  Diagnosis Status (1): New  Nutrition Diagnosis 1: Inadequate oral intake  Related to (1): stomach aches, reflux  As Evidenced by (1): reports of eating less x ~1 week       Nutrition Interventions/Recommendations         Nutrition Prescription:  Individualized Nutrition Prescription Provided for : Regular diet with ONS        Nutrition Interventions:   Interventions: Meals and snacks, Medical food supplement  Meals and Snacks: General healthful diet  Goal: Consumes 3 meals per day  Medical Food Supplement: Commercial beverage  Goal: Ensure Plus High Protein BID (provides 350 kcal, 20 g protein per serving) strawberry         Nutrition Education:   Discussed use of ONS at home during times of decreased appetite and intake.        Nutrition Monitoring and Evaluation   Food/Nutrient Related History Monitoring  Monitoring and Evaluation Plan: Energy intake, Amount of food, Fluid intake  Energy Intake: Estimated energy intake  Criteria: Pt  meets >75% of estimated energy needs  Fluid Intake: Estimated fluid intake  Criteria: Meets >75% of estimated fluid needs  Amount of Food: Estimated amout of food, Medical food intake  Criteria: Pt consumes >75% of meals and supplements    Body Composition/Growth/Weight History  Monitoring and Evaluation Plan: Weight  Weight: Measured weight  Criteria: Maintains stable weight    Biochemical Data, Medical Tests and Procedures  Monitoring and Evaluation Plan: Glucose/endocrine profile, Electrolyte/renal panel  Electrolyte and Renal Panel: Sodium, Potassium, Phosphorus  Criteria: Electrolytes WNL  Glucose/Endocrine Profile: Glucose, casual  Criteria: BG within desirable range    Nutrition Focused Physical Findings  Monitoring and Evaluation Plan: Digestive System  Digestive System: Other (Comment) (abdominal pain)         Time Spent (min): 60 minutes

## 2024-08-29 NOTE — CARE PLAN
The patient's goals for the shift include      The clinical goals for the shift include patient will remain free from injury

## 2024-08-29 NOTE — H&P
Medical Group History and Physical      ASSESSMENT & PLAN:     #.  Sepsis secondary to left lower extremity cellulitis  -Presented with leukocytosis and tachycardia, diffuse erythema of left lower extremity without drainage  -Continue cefazolin  -Follow-up blood cultures    #.  Left knee pain 2/2 hemarthrosis of left knee  -Likely induced by trauma while on Coumadin, Doppler negative for DVT  -Orthopedic surgery consult  -Pain control    #.  Hyponatremia  -Likely hypotonic  -IV fluids    #.  History of DVT  #.  Long-term anticoagulation  -Doppler negative for DVT  -Continue home Coumadin    VTE PPX: Coumadin      Kojo Jasso MD    --Of note, this documentation is completed using the Dragon Dictation system (voice recognition software). There may be spelling and/or grammatical errors that were not corrected prior to final submission.--    HISTORY OF PRESENT ILLNESS:   Chief Complaint: Left knee pain    Sommer Farmer is a 89 y.o. female presenting with left knee pain.  Patient states that she has had a hematoma in her left knee for the last 3 weeks.  She was evaluated and states that it was getting better.  Today she returns with worsening pain.  It has been limiting her ambulation.  Her left calf is also gotten more swollen and red.  She denies any drainage from the area.  Denies any fever or chills.     ER Course: /58, , RR 29, T36.9.  Labs notable for sodium 128, creatinine 1.13, BUN 25, INR 3.3, WBC 14.7.  X-ray of left knee showed grossly stable large joint effusion.  CT angio of left lower extremity was obtained which showed no evidence of arterial vascular injury and also revealed large left knee hemarthrosis not significantly changed.  Patient was given pain control and started on Ancef.    ROS  10 point review of systems negative except per HPI     PAST HISTORIES:     Past Medical History  She has no past medical history on file.    Surgical History  She has a past surgical history  that includes CT angio abdomen pelvis w and or wo IV IV contrast (9/8/2019).     Social History  She reports that she has never smoked. She has never used smokeless tobacco. She reports that she does not drink alcohol and does not use drugs.    Family History  No family history on file.    Allergies:  Proton pump inhibitors and Tamoxifen      OBJECTIVE:      Last Recorded Vitals  /76 (BP Location: Right arm, Patient Position: Lying)   Pulse (!) 102   Temp 36.9 °C (98.4 °F) (Temporal)   Resp (!) 29   Wt 64.5 kg (142 lb 3.2 oz)   SpO2 95%     Last I/O:  No intake/output data recorded.    Physical Exam   Gen: NAD, appears stated age  HEENT: EOM, MMM  CV: RRR, no murmurs rubs or gallops  Resp: Clear to auscultation bilaterally, normal effort  Abdomen: soft, NT,+BS  LE: Left knee tenderness to palpation, left lower extremity with circumferential erythema no active drainage, no deformity  Neuro: A&Ox4, moving all extremities    LABS AND IMAGING:       Relevant Results  Labs Reviewed   CBC WITH AUTO DIFFERENTIAL - Abnormal       Result Value    WBC 14.7 (*)     nRBC 0.0      RBC 3.52 (*)     Hemoglobin 10.5 (*)     Hematocrit 31.0 (*)     MCV 88      MCH 29.8      MCHC 33.9      RDW 12.8      Platelets 393      Neutrophils % 82.2      Immature Granulocytes %, Automated 0.3      Lymphocytes % 9.0      Monocytes % 5.5      Eosinophils % 2.4      Basophils % 0.6      Neutrophils Absolute 12.07 (*)     Immature Granulocytes Absolute, Automated 0.05      Lymphocytes Absolute 1.33      Monocytes Absolute 0.81 (*)     Eosinophils Absolute 0.35      Basophils Absolute 0.09     COMPREHENSIVE METABOLIC PANEL - Abnormal    Glucose 112 (*)     Sodium 128 (*)     Potassium 4.1      Chloride 96 (*)     Bicarbonate 22      Anion Gap 14      Urea Nitrogen 25 (*)     Creatinine 1.13 (*)     eGFR 46 (*)     Calcium 9.3      Albumin 3.5      Alkaline Phosphatase 71      Total Protein 6.8      AST 22      Bilirubin, Total 0.7       ALT 8     COAGULATION SCREEN - Abnormal    Protime 38.2 (*)     INR 3.3 (*)     aPTT 42 (*)     Narrative:     The APTT is no longer used for monitoring Unfractionated Heparin Therapy. For monitoring Heparin Therapy, use the Heparin Assay.   BLOOD CULTURE   BLOOD CULTURE   LACTATE   URINALYSIS WITH REFLEX CULTURE AND MICROSCOPIC    Narrative:     The following orders were created for panel order Urinalysis with Reflex Culture and Microscopic.  Procedure                               Abnormality         Status                     ---------                               -----------         ------                     Urinalysis with Reflex C...[[id number]]                                                 Extra Urine Gray Tube[[id number]]                                                         Please view results for these tests on the individual orders.   URINALYSIS WITH REFLEX CULTURE AND MICROSCOPIC   EXTRA URINE GRAY TUBE     XR knee left 4+ views   Final Result   No acute fracture or malalignment.        Chronic irregularity of the articular surface of the patella and   lateral femoral condyle, likely related to old osteochondral injury   and superimposed degenerative changes.        Grossly stable very large joint effusion.        MACRO:   None        Signed by: Antonia Rucker 8/29/2024 3:40 AM   Dictation workstation:   [id number]      CT angio lower extremity left w and or wo IV contrast   Final Result   No evidence of arterial vascular injury of the left lower extremity.        Large left knee hemarthrosis, not significantly changed from   08/05/2024. No evidence of acute osseous abnormality.        Single vessel left lower extremity runoff, with patency of the   dorsalis pedis artery. Severe stenosis of the left popliteal artery.        Nonspecific left lower extremity subcutaneous edema.        Focal hypodensity in the central uterus suspicious for endometrial   thickening fluid. Evaluation follow-up  nonemergent pelvic ultrasound   is recommended.        MACRO:   Critical Finding:  See findings. Notification was initiated on   8/29/2024 at 3:32 am by  Antonia Rucker.  (**-YCF-**) Instructions:        Signed by: Antonia Rucker 8/29/2024 3:33 AM   Dictation workstation:   [id number]      Lower extremity venous duplex left   Final Result   1.  No sonographic evidence for deep vein thrombosis within the   evaluated veins of the left lower extremity from the inguinal region   to the popliteal region. Suboptimal visualization of the left calf   veins on this exam.             MACRO:   None.        Signed by: Shannon Rascon 8/29/2024 3:09 AM   Dictation workstation:   [id number]

## 2024-08-29 NOTE — ED PROVIDER NOTES
HPI   Chief Complaint   Patient presents with    Knee Pain     Left knee pain and swelling that began today. 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  Patient had a hematoma in her left knee approximate 3 weeks ago.  States she was evaluated and got better.  No adjustments to her warfarin dosage.  Patient had an INR of 2.4 on August 19.  Patient had return of her pain this evening as well as redness in her left lower extremity.  No fevers, chills, vomiting, diarrhea.  Fell on her knee approximate month ago, was evaluated and discharged home.  No new trauma.  Denies chest pain, shortness of breath, abdominal pain.              Patient History   No past medical history on file.  Past Surgical History:   Procedure Laterality Date    CT ABDOMEN PELVIS ANGIOGRAM W AND/OR WO IV CONTRAST  9/8/2019    CT ABDOMEN PELVIS ANGIOGRAM W AND/OR WO IV CONTRAST 9/8/2019 [facility]     No family history on file.  Social History     Tobacco Use    Smoking status: Not on file    Smokeless tobacco: Not on file   Substance Use Topics    Alcohol use: Not on file    Drug use: Not on file       Physical Exam   ED Triage Vitals [08/29/24 0035]   Temperature Heart Rate Respirations BP   36.9 °C (98.4 °F) 79 20 158/58      Pulse Ox Temp Source Heart Rate Source Patient Position   100 % Temporal Monitor Sitting      BP Location FiO2 (%)     Right arm --       Physical Exam  Constitutional:       General: She is not in acute distress.  HENT:      Head: Normocephalic.   Eyes:      Extraocular Movements: Extraocular movements intact.      Conjunctiva/sclera: Conjunctivae normal.      Pupils: Pupils are equal, round, and reactive to light.   Cardiovascular:      Rate and Rhythm: Normal rate and regular rhythm.      Pulses: Normal pulses.      Heart sounds: Normal heart sounds.   Pulmonary:      Effort: Pulmonary effort is normal.       Breath sounds: Normal breath sounds.   Abdominal:      General: There is no distension.      Palpations: Abdomen is soft. There is no mass.      Tenderness: There is no abdominal tenderness. There is no guarding.   Musculoskeletal:         General: No deformity.      Cervical back: Normal range of motion and neck supple.      Right lower leg: No edema.      Left lower leg: Edema (trace) present.      Comments: Edema and marked tenderness palpation of left knee, no erythema or warmth.  Ecchymoses on medial aspect of left upper leg.  Pain with range of motion of left knee.  2+ DP pulses bilaterally, DP pulse on left confirmed by ultrasound.  Erythema of the left lower leg, mild cobblestoning on bedside ultrasound.   Skin:     General: Skin is warm and dry.      Findings: No lesion or rash.   Neurological:      General: No focal deficit present.      Mental Status: She is alert and oriented to person, place, and time. Mental status is at baseline.      Cranial Nerves: No cranial nerve deficit.      Sensory: No sensory deficit.      Motor: No weakness.   Psychiatric:         Mood and Affect: Mood normal.           ED Course & MDM   Diagnoses as of 08/29/24 0524   Hemarthrosis   Cellulitis of left lower extremity   Sepsis without acute organ dysfunction, due to unspecified organism (Multi)                 No data recorded     Jazmín Coma Scale Score: 15 (08/29/24 0036 : Imelda Antoine RN)                           Medical Decision Making  Patient is a 89-year-old female with above-stated past medical history who presents for leg pain.  Patient's ultrasound did not show signs of DVT.  She is anticoagulated and is slightly supratherapeutic which lowers my suspicion for this.  X-ray of the knee showed no acute findings.  CT angio was ordered to rule out active bleed, this was negative for vascular injury.  Her hemarthrosis is not significantly changed.  Patient is aware of the hypodensity in her uterus and is following up.   Patient's physical exam given her the results is concerning for possible cellulitis, sepsis was noted at 0341 and antibiotics were ordered as well as cultures.  Patient is clinically well-appearing nontoxic.  I discussed her case with orthopedics who did not feel the patient would require operative management.  Given patient's inability to ambulate safely and the concern for sepsis, she was admitted to the medicine service for further management.    Disclaimer: This note was dictated using speech recognition software. Minor errors in transcription may be present. Please call if questions.     Sheldon Gutierrez MD  [facility] Emergency Medicine  Contact on [employer]        Problems Addressed:  Cellulitis of left lower extremity: acute illness or injury  Sepsis without acute organ dysfunction, due to unspecified organism (Multi): acute illness or injury    Amount and/or Complexity of Data Reviewed  Labs: ordered.  Radiology: ordered.  ECG/medicine tests: ordered and independent interpretation performed.        Procedure  Critical Care    Performed by: Sheldon Gutierrez MD  Authorized by: Sheldon Gutierrez MD    Critical care provider statement:     Critical care time (minutes):  32    Critical care time was exclusive of:  Separately billable procedures and treating other patients    Critical care was necessary to treat or prevent imminent or life-threatening deterioration of the following conditions:  Sepsis    Critical care was time spent personally by me on the following activities:  Development of treatment plan with patient or surrogate, discussions with consultants, evaluation of patient's response to treatment, examination of patient, obtaining history from patient or surrogate, ordering and performing treatments and interventions, ordering and review of laboratory studies, ordering and review of radiographic studies, pulse oximetry, re-evaluation of patient's condition and review of old charts    Care discussed with:  admitting provider         Sheldon Gutierrez MD  08/29/24 0550

## 2024-08-29 NOTE — PROGRESS NOTES
08/29/24 1713   Discharge Planning   Living Arrangements Family members   Support Systems Children;Family members   Assistance Needed independent cooking, bathing etc.  pt uses cane and has walker if needed.   Type of Residence Private residence   Number of Stairs Within Residence 11   Who is requesting discharge planning? Provider   Home or Post Acute Services In home services   Type of Post Acute Facility Services Skilled nursing   Type of Home Care Services Home nursing visits;Home OT;Home PT   Expected Discharge Disposition  Services   Does the patient need discharge transport arranged? No   Transportation Needs   In the past 12 months, has lack of transportation kept you from medical appointments or from getting medications? no   In the past 12 months, has lack of transportation kept you from meetings, work, or from getting things needed for daily living? No   Patient Choice   Patient / Family choosing to utilize agency / facility established prior to hospitalization No     Pt admitted from home with cellulitis.  Pt states she resides at home with grand daughter Vania.  Pt states she is relatively independent,  uses  cane, has walker to use as needed.  States her home is 2 stories with 11 steps upstairs.  Declines difficulty with climbing stairs.  Has handrails on both sides of steps.   Pt follows Dr Liu [facility] primary care and preferred pharmacy is [facility].  Pt is open to [facility] at time of dc, declines SNF at present time.  Agreeable to hhc list- printed from care port and provided to patient and grand daughter to look over.  Care transitions to follow up for hhc preference.  Will need orders.  No plan for dc on this date.

## 2024-08-30 LAB
ANION GAP SERPL CALC-SCNC: 12 MMOL/L (ref 10–20)
BACTERIA UR CULT: NO GROWTH
BUN SERPL-MCNC: 20 MG/DL (ref 6–23)
CALCIUM SERPL-MCNC: 9.4 MG/DL (ref 8.6–10.3)
CHLORIDE SERPL-SCNC: 94 MMOL/L (ref 98–107)
CO2 SERPL-SCNC: 25 MMOL/L (ref 21–32)
CREAT SERPL-MCNC: 0.93 MG/DL (ref 0.5–1.05)
EGFRCR SERPLBLD CKD-EPI 2021: 58 ML/MIN/1.73M*2
ERYTHROCYTE [DISTWIDTH] IN BLOOD BY AUTOMATED COUNT: 12.7 % (ref 11.5–14.5)
GLUCOSE SERPL-MCNC: 123 MG/DL (ref 74–99)
HCT VFR BLD AUTO: 33.8 % (ref 36–46)
HGB BLD-MCNC: 11.4 G/DL (ref 12–16)
HOLD SPECIMEN: NORMAL
HOLD SPECIMEN: NORMAL
INR PPP: 4 (ref 0.9–1.1)
INR PPP: 4.2 (ref 0.9–1.1)
MCH RBC QN AUTO: 29.9 PG (ref 26–34)
MCHC RBC AUTO-ENTMCNC: 33.7 G/DL (ref 32–36)
MCV RBC AUTO: 89 FL (ref 80–100)
NRBC BLD-RTO: 0 /100 WBCS (ref 0–0)
PLATELET # BLD AUTO: 396 X10*3/UL (ref 150–450)
POTASSIUM SERPL-SCNC: 4.9 MMOL/L (ref 3.5–5.3)
PROTHROMBIN TIME: 45.7 SECONDS (ref 9.8–12.8)
PROTHROMBIN TIME: 48.4 SECONDS (ref 9.8–12.8)
RBC # BLD AUTO: 3.81 X10*6/UL (ref 4–5.2)
SODIUM SERPL-SCNC: 126 MMOL/L (ref 136–145)
TSH SERPL-ACNC: 1.51 MIU/L (ref 0.44–3.98)
WBC # BLD AUTO: 14 X10*3/UL (ref 4.4–11.3)

## 2024-08-30 PROCEDURE — 2500000001 HC RX 250 WO HCPCS SELF ADMINISTERED DRUGS (ALT 637 FOR MEDICARE OP): Performed by: NURSE PRACTITIONER

## 2024-08-30 PROCEDURE — 0S9D3ZZ DRAINAGE OF LEFT KNEE JOINT, PERCUTANEOUS APPROACH: ICD-10-PCS | Performed by: ORTHOPAEDIC SURGERY

## 2024-08-30 PROCEDURE — 85027 COMPLETE CBC AUTOMATED: CPT | Performed by: STUDENT IN AN ORGANIZED HEALTH CARE EDUCATION/TRAINING PROGRAM

## 2024-08-30 PROCEDURE — 97165 OT EVAL LOW COMPLEX 30 MIN: CPT | Mod: GO

## 2024-08-30 PROCEDURE — 36415 COLL VENOUS BLD VENIPUNCTURE: CPT | Performed by: STUDENT IN AN ORGANIZED HEALTH CARE EDUCATION/TRAINING PROGRAM

## 2024-08-30 PROCEDURE — 99232 SBSQ HOSP IP/OBS MODERATE 35: CPT | Performed by: ORTHOPAEDIC SURGERY

## 2024-08-30 PROCEDURE — 36415 COLL VENOUS BLD VENIPUNCTURE: CPT | Performed by: HOSPITALIST

## 2024-08-30 PROCEDURE — 2500000004 HC RX 250 GENERAL PHARMACY W/ HCPCS (ALT 636 FOR OP/ED): Performed by: HOSPITALIST

## 2024-08-30 PROCEDURE — 2500000004 HC RX 250 GENERAL PHARMACY W/ HCPCS (ALT 636 FOR OP/ED): Performed by: STUDENT IN AN ORGANIZED HEALTH CARE EDUCATION/TRAINING PROGRAM

## 2024-08-30 PROCEDURE — 1200000002 HC GENERAL ROOM WITH TELEMETRY DAILY

## 2024-08-30 PROCEDURE — 20610 DRAIN/INJ JOINT/BURSA W/O US: CPT | Performed by: ORTHOPAEDIC SURGERY

## 2024-08-30 PROCEDURE — 2500000005 HC RX 250 GENERAL PHARMACY W/O HCPCS: Performed by: STUDENT IN AN ORGANIZED HEALTH CARE EDUCATION/TRAINING PROGRAM

## 2024-08-30 PROCEDURE — 80048 BASIC METABOLIC PNL TOTAL CA: CPT | Performed by: STUDENT IN AN ORGANIZED HEALTH CARE EDUCATION/TRAINING PROGRAM

## 2024-08-30 PROCEDURE — 85610 PROTHROMBIN TIME: CPT | Performed by: HOSPITALIST

## 2024-08-30 PROCEDURE — 99232 SBSQ HOSP IP/OBS MODERATE 35: CPT | Performed by: HOSPITALIST

## 2024-08-30 PROCEDURE — 84443 ASSAY THYROID STIM HORMONE: CPT | Performed by: HOSPITALIST

## 2024-08-30 PROCEDURE — 2500000001 HC RX 250 WO HCPCS SELF ADMINISTERED DRUGS (ALT 637 FOR MEDICARE OP): Performed by: HOSPITALIST

## 2024-08-30 PROCEDURE — 97161 PT EVAL LOW COMPLEX 20 MIN: CPT | Mod: GP | Performed by: PHYSICAL THERAPIST

## 2024-08-30 PROCEDURE — 2500000004 HC RX 250 GENERAL PHARMACY W/ HCPCS (ALT 636 FOR OP/ED): Mod: JZ | Performed by: INTERNAL MEDICINE

## 2024-08-30 RX ORDER — VANCOMYCIN HYDROCHLORIDE 1 G/200ML
1000 INJECTION, SOLUTION INTRAVENOUS EVERY 24 HOURS
Status: DISCONTINUED | OUTPATIENT
Start: 2024-08-31 | End: 2024-08-30

## 2024-08-30 RX ORDER — CEFAZOLIN SODIUM 1 G/50ML
1 SOLUTION INTRAVENOUS EVERY 8 HOURS
Status: DISCONTINUED | OUTPATIENT
Start: 2024-08-30 | End: 2024-09-04

## 2024-08-30 RX ORDER — L. ACIDOPHILUS/L.BULGARICUS 1MM CELL
1 TABLET ORAL 2 TIMES DAILY
Status: DISCONTINUED | OUTPATIENT
Start: 2024-08-30 | End: 2024-09-09 | Stop reason: HOSPADM

## 2024-08-30 RX ORDER — BENZONATATE 100 MG/1
200 CAPSULE ORAL 3 TIMES DAILY PRN
Status: DISCONTINUED | OUTPATIENT
Start: 2024-08-30 | End: 2024-09-09 | Stop reason: HOSPADM

## 2024-08-30 RX ORDER — GUAIFENESIN 600 MG/1
600 TABLET, EXTENDED RELEASE ORAL 2 TIMES DAILY PRN
Status: DISCONTINUED | OUTPATIENT
Start: 2024-08-30 | End: 2024-09-09 | Stop reason: HOSPADM

## 2024-08-30 RX ORDER — CALCIUM CARBONATE 200(500)MG
500 TABLET,CHEWABLE ORAL 4 TIMES DAILY PRN
Status: DISCONTINUED | OUTPATIENT
Start: 2024-08-30 | End: 2024-09-09 | Stop reason: HOSPADM

## 2024-08-30 RX ORDER — VANCOMYCIN HYDROCHLORIDE 1 G/20ML
INJECTION, POWDER, LYOPHILIZED, FOR SOLUTION INTRAVENOUS DAILY PRN
Status: DISCONTINUED | OUTPATIENT
Start: 2024-08-30 | End: 2024-08-30

## 2024-08-30 RX ORDER — HYDROMORPHONE HYDROCHLORIDE 0.2 MG/ML
0.2 INJECTION INTRAMUSCULAR; INTRAVENOUS; SUBCUTANEOUS ONCE
Status: COMPLETED | OUTPATIENT
Start: 2024-08-30 | End: 2024-08-30

## 2024-08-30 SDOH — ECONOMIC STABILITY: HOUSING INSECURITY: IN THE PAST 12 MONTHS HAS THE ELECTRIC, GAS, OIL, OR WATER COMPANY THREATENED TO SHUT OFF SERVICES IN YOUR HOME?: NO

## 2024-08-30 SDOH — ECONOMIC STABILITY: INCOME INSECURITY: IN THE LAST 12 MONTHS, WAS THERE A TIME WHEN YOU WERE NOT ABLE TO PAY THE MORTGAGE OR RENT ON TIME?: NO

## 2024-08-30 SDOH — ECONOMIC STABILITY: HOUSING INSECURITY: IN THE LAST 12 MONTHS, HOW MANY PLACES HAVE YOU LIVED?: 1

## 2024-08-30 SDOH — ECONOMIC STABILITY: FOOD INSECURITY: WITHIN THE PAST 12 MONTHS, THE FOOD YOU BOUGHT JUST DIDN'T LAST AND YOU DIDN'T HAVE MONEY TO GET MORE.: NEVER TRUE

## 2024-08-30 SDOH — ECONOMIC STABILITY: HOUSING INSECURITY
IN THE LAST 12 MONTHS, WAS THERE A TIME WHEN YOU DID NOT HAVE A STEADY PLACE TO SLEEP OR SLEPT IN A SHELTER (INCLUDING NOW)?: NO

## 2024-08-30 SDOH — ECONOMIC STABILITY: HOUSING INSECURITY: IN THE LAST 12 MONTHS, WAS THERE A TIME WHEN YOU WERE NOT ABLE TO PAY THE MORTGAGE OR RENT ON TIME?: NO

## 2024-08-30 SDOH — ECONOMIC STABILITY: FOOD INSECURITY: WITHIN THE PAST 12 MONTHS, YOU WORRIED THAT YOUR FOOD WOULD RUN OUT BEFORE YOU GOT MONEY TO BUY MORE.: NEVER TRUE

## 2024-08-30 SDOH — ECONOMIC STABILITY: TRANSPORTATION INSECURITY: IN THE PAST 12 MONTHS, HAS LACK OF TRANSPORTATION KEPT YOU FROM MEDICAL APPOINTMENTS OR FROM GETTING MEDICATIONS?: NO

## 2024-08-30 SDOH — ECONOMIC STABILITY: FOOD INSECURITY: WITHIN THE PAST 12 MONTHS, YOU WORRIED THAT YOUR FOOD WOULD RUN OUT BEFORE YOU GOT THE MONEY TO BUY MORE.: NEVER TRUE

## 2024-08-30 SDOH — ECONOMIC STABILITY: FOOD INSECURITY

## 2024-08-30 SDOH — ECONOMIC STABILITY: TRANSPORTATION INSECURITY
IN THE PAST 12 MONTHS, HAS THE LACK OF TRANSPORTATION KEPT YOU FROM MEDICAL APPOINTMENTS OR FROM GETTING MEDICATIONS?: NO

## 2024-08-30 SDOH — ECONOMIC STABILITY: TRANSPORTATION INSECURITY
IN THE PAST 12 MONTHS, HAS LACK OF TRANSPORTATION KEPT YOU FROM MEETINGS, WORK, OR FROM GETTING THINGS NEEDED FOR DAILY LIVING?: NO

## 2024-08-30 SDOH — ECONOMIC STABILITY: HOUSING INSECURITY

## 2024-08-30 SDOH — ECONOMIC STABILITY: GENERAL

## 2024-08-30 SDOH — ECONOMIC STABILITY: TRANSPORTATION INSECURITY

## 2024-08-30 ASSESSMENT — COGNITIVE AND FUNCTIONAL STATUS - GENERAL
DAILY ACTIVITIY SCORE: 21
MOVING FROM LYING ON BACK TO SITTING ON SIDE OF FLAT BED WITH BEDRAILS: A LOT
CLIMB 3 TO 5 STEPS WITH RAILING: TOTAL
PERSONAL GROOMING: A LITTLE
STANDING UP FROM CHAIR USING ARMS: TOTAL
TURNING FROM BACK TO SIDE WHILE IN FLAT BAD: TOTAL
MOVING TO AND FROM BED TO CHAIR: TOTAL
WALKING IN HOSPITAL ROOM: A LOT
HELP NEEDED FOR BATHING: A LITTLE
HELP NEEDED FOR BATHING: A LOT
DAILY ACTIVITIY SCORE: 13
TURNING FROM BACK TO SIDE WHILE IN FLAT BAD: A LITTLE
TOILETING: A LITTLE
MOBILITY SCORE: 16
DRESSING REGULAR LOWER BODY CLOTHING: TOTAL
DRESSING REGULAR LOWER BODY CLOTHING: A LITTLE
TOILETING: TOTAL
DRESSING REGULAR UPPER BODY CLOTHING: A LOT
CLIMB 3 TO 5 STEPS WITH RAILING: TOTAL
WALKING IN HOSPITAL ROOM: TOTAL
MOVING TO AND FROM BED TO CHAIR: A LITTLE
MOBILITY SCORE: 7
STANDING UP FROM CHAIR USING ARMS: A LITTLE

## 2024-08-30 ASSESSMENT — PAIN SCALES - GENERAL
PAINLEVEL_OUTOF10: 10 - WORST POSSIBLE PAIN
PAINLEVEL_OUTOF10: 7
PAINLEVEL_OUTOF10: 8
PAINLEVEL_OUTOF10: 10 - WORST POSSIBLE PAIN
PAINLEVEL_OUTOF10: 8
PAINLEVEL_OUTOF10: 10 - WORST POSSIBLE PAIN
PAINLEVEL_OUTOF10: 10 - WORST POSSIBLE PAIN
PAINLEVEL_OUTOF10: 7
PAINLEVEL_OUTOF10: 10 - WORST POSSIBLE PAIN

## 2024-08-30 ASSESSMENT — PAIN DESCRIPTION - LOCATION
LOCATION: KNEE

## 2024-08-30 ASSESSMENT — PAIN - FUNCTIONAL ASSESSMENT
PAIN_FUNCTIONAL_ASSESSMENT: 0-10
PAIN_FUNCTIONAL_ASSESSMENT: 0-10

## 2024-08-30 ASSESSMENT — PAIN DESCRIPTION - ORIENTATION
ORIENTATION: LEFT

## 2024-08-30 ASSESSMENT — ACTIVITIES OF DAILY LIVING (ADL)
LACK_OF_TRANSPORTATION: NO
BATHING_ASSISTANCE: MAXIMAL

## 2024-08-30 ASSESSMENT — SOCIAL DETERMINANTS OF HEALTH (SDOH): IN THE PAST 12 MONTHS, HAS THE ELECTRIC, GAS, OIL, OR WATER COMPANY THREATENED TO SHUT OFF SERVICE IN YOUR HOME?: NO

## 2024-08-30 ASSESSMENT — PAIN SCALES - PAIN ASSESSMENT IN ADVANCED DEMENTIA (PAINAD): TOTALSCORE: MEDICATION (SEE MAR)

## 2024-08-30 NOTE — PROGRESS NOTES
Vancomycin Dosing by Pharmacy- Cessation of Therapy    Consult to pharmacy for vancomycin dosing has been discontinued by the prescriber, pharmacy will sign off at this time.    Please call pharmacy if there are further questions or re-enter a consult if vancomycin is resumed.     AMY Goss. Ph.

## 2024-08-30 NOTE — PROGRESS NOTES
Physical Therapy    Physical Therapy Evaluation    Patient Name: Sommer Farmer  MRN: [id number]  Today's Date: 8/30/2024   Time Calculation  Start Time: 0958  Stop Time: 1020  Time Calculation (min): 22 min  806/806-A    Assessment/Plan   PT Assessment  PT Assessment Results: Decreased strength, Decreased range of motion, Decreased endurance, Impaired balance, Decreased mobility, Pain  Rehab Prognosis: Fair (pending pain control)  Barriers to Discharge: Unable to ambulate at this time  End of Session Communication: Bedside nurse, Physician  Assessment Comment: Pt. performed bed mob with maxAx2, but is unable to perform transfers and ambulation at this time. Will likely benefit from continued PT to increase mobility.  End of Session Patient Position: Bed, 3 rail up, Alarm on  IP OR SWING BED PT PLAN  Inpatient or Swing Bed: Inpatient  PT Plan  Treatment/Interventions: Bed mobility, Transfer training, Gait training, Balance training, Strengthening, Endurance training, Therapeutic exercise, Therapeutic activity, Home exercise program  PT Plan: Ongoing PT  PT Frequency: 3 times per week  PT Discharge Recommendations: Moderate intensity level of continued care  Equipment Recommended upon Discharge: Wheeled walker  PT Recommended Transfer Status: Total assist  PT - OK to Discharge: Yes (to next level of care, when medically stable)    Subjective     Current Problem:  1. Hemarthrosis        2. Cellulitis of left lower extremity        3. Sepsis without acute organ dysfunction, due to unspecified organism (Multi)              General Visit Information:  General  Reason for Referral: Impaired mobility  Referred By: Francisco  Past Medical History Relevant to Rehab: Includes: Hematoma L knee from fall around 7/28/24, HTN, PVD, OA, CKD, Afib on warfarin, DVT.  Family/Caregiver Present:  (2 family members and roommate.)  Co-Treatment: OT  Prior to Session Communication: Bedside nurse (pt. c/o nausea; RN aware and provided  medication prior to session)  Patient Position Received: Bed, 3 rail up, Alarm on (L knee edema present and ecchymosis to L upper thigh; L knee lacking ~ 20 degrees of extension at rest, in supine)  General Comment: To ED 8/29 with L knee pain.  Admitted with L knee hemarthrosis, hyponatremia and sepsis d/t LLE cellulitis. Ortho attempted aspiration of L knee 8/30, unsuccessful due to coagulated blood.    Home Living/Prior Level of Function:  Home Living  Home Living Comments: Lives with grand daughter and friend/roommate. 2 steps with handrail to enter. 2nd floor full bath and bedroom. 11 steps with handrail to 2nd floor. 1 step down into an area that could be turned into a 1st floor set up. Has walk in shower and tub/shower. Currently no seat/safety bars in showers. Bishop states that she can get pt. seat, safety bars, ramp, etc. if needed. Owns FWW, quad cane and BSC. Recent use of quad cane for ambulation with mod. indep. Independent with ADLs. Shared IADLs. 1 recent fall. Does not drive.         Precautions:  Precautions  LE Weight Bearing Status: Weight Bearing as Tolerated (WBAT LLE. Okay for gentle ROM as tolerated. Can use knee immobilizer for comfort.)  Medical Precautions: Fall precautions  Precautions Comment: Tachycardia with activity; Limb alert LUE    Vital Signs:  Vital Signs  Heart Rate:  (106 to 134, increased with pain and sitting EOB)  Objective     Pain:  Pain Assessment  Pain Assessment: 0-10  0-10 (Numeric) Pain Score: 10 - Worst possible pain  Pain Type: Acute pain  Pain Location: Knee  Pain Orientation: Left  Pain Interventions: Repositioned, Rest, Elevated    Cognition:  Cognition  Overall Cognitive Status:  (Very Anxious throughout session, especially during mobility)  Attention:  (Difficulty maintaining focus on task d/t focus on pain and nausea)    General Assessments:      Activity Tolerance  Endurance: Decreased tolerance for upright activites  Activity Tolerance Comments: Limited by  nausea and L knee pain  Sensation  Light Touch: No apparent deficits  Strength  Strength Comments: BUE WFL; RLE at least 3+/5; unable to assess LLE due to severe L knee pain           Static Sitting Balance  Static Sitting-Balance Support: Bilateral upper extremity supported  Static Sitting-Level of Assistance: Maximum assistance (trunk and LLE support)  Static Sitting-Comment/Number of Minutes: 2 min; limited by pain and nausea       Functional Assessments:     Bed Mobility  Bed Mobility:  (supine to/from sit with maxAx2; LLE supported throughout d/t 10/10 knee pain; pt. would likely benefit from L knee immobilizer (ortho recommendations seen after PT eval))  Transfers  Transfer:  (Pt. declined d/t 10/10 L knee pain and nausea. Pt. also became tachycardic (134 bpm) while sitting EOB.)             Extremity/Trunk Assessments:        RLE   RLE :  (ROM WFL)  LLE   LLE :  (L foot externally rotated- chronic issue; L knee lacking ~ 20 degrees of extension; unable to assess L knee flexion d/t 10/10 pain; L hip ROM appears WFL)    Outcome Measures:     [facility] Basic Mobility  Turning from your back to your side while in a flat bed without using bedrails: A lot  Moving from lying on your back to sitting on the side of a flat bed without using bedrails: Total  Moving to and from bed to chair (including a wheelchair): Total  Standing up from a chair using your arms (e.g. wheelchair or bedside chair): Total  To walk in hospital room: Total  Climbing 3-5 steps with railing: Total  Basic Mobility - Total Score: 7                                                             Goals:  Encounter Problems       Encounter Problems (Active)       Impaired mobility        Perform all bed mobility with minAx1       Start:  08/30/24    Expected End:  09/13/24            Perform all transfers with ww and minAx1       Start:  08/30/24    Expected End:  09/13/24            Patient will ambulate 25 ft with ww and minAx1       Start:  08/30/24     Expected End:  09/13/24            Patient will perform BLE HEP with supervision to improve strength and L knee ROM        Start:  08/30/24    Expected End:  09/13/24            Patient will tolerate sitting EOB >/= 5 min with supervision        Start:  08/30/24    Expected End:  09/13/24               Pain - Adult            Education Documentation  Mobility Training, taught by Lucita Hackett PT at 8/30/2024  4:00 PM.  Learner: Family, Patient  Readiness: Acceptance  Method: Explanation  Response: Verbalizes Understanding, Needs Reinforcement  Comment: discussed the benefits of mobility    Education Comments  No comments found.

## 2024-08-30 NOTE — PROGRESS NOTES
"PROGRESS NOTE    ASSESSMENT AND PLAN:     #.  Sepsis secondary to left lower extremity cellulitis  -Presented with leukocytosis and tachycardia, diffuse erythema of left lower extremity without drainage  -She continues to have persistent leukocytosis, tachycardic and severe pain  -Escalate antibiotics to include vancomycin, continue Ancef  -Cultures to date show no growth.  -Infectious disease consulted     #.  Left knee pain 2/2 hemarthrosis of left knee  -Likely induced by trauma while on Coumadin, Doppler negative for DVT  -CT scan shows a large left knee hemarthrosis not significantly changed from 8/5  -Orthopedic surgery consult, attempted an aspiration unable to aspirate.     #.  Hyponatremia  -Likely hypotonic  -IV fluids     #.  History of DVT in 2021  #.  Long-term anticoagulation  -Doppler negative for DVT  -Coumadin held due to supratherapeutic INR  -It appears that her DVT was provoked in 2021 when she had COVID-19.  Family will check with her doctor to see if she has any hypercoagulable disease.  If not patient can be discontinued off anticoagulation given that this is her first provoked DVT, and she has a history of multiple falls.     VTE PPX: Supratherapeutic INR     Disposition: Plan to discharge pending PT OT evaluation.        SUBJECTIVE:   Admit Date: 8/29/2024    Interval History: Laying in bed, complaining of severe pain.      OBJECTIVE:   Vitals: /62 (BP Location: Right arm, Patient Position: Lying)   Pulse (!) 116   Temp 37.1 °C (98.8 °F) (Temporal)   Resp 18   Ht 1.575 m (5' 2.01\")   Wt 63 kg (139 lb)   SpO2 96%   BMI 25.42 kg/m²    Wt Readings from Last 3 Encounters:   08/29/24 63 kg (139 lb)   08/04/24 64.9 kg (143 lb)      24HR INTAKE/OUTPUT:    Intake/Output Summary (Last 24 hours) at 8/30/2024 1244  Last data filed at 8/30/2024 1158  Gross per 24 hour   Intake 1138.33 ml   Output 150 ml   Net 988.33 ml       PHYSICAL EXAM:   GENERAL: Laying in bed, does not appear to be in " "any distress.   HEENT: HEAD: Normocephalic atraumatic.  Neck: Supple.  Eyes: Pupils are reactive to direct light.   CVS: S1, S2 heard. Regular rate and rhythm  LUNGS: Clear to auscultate bilaterally. No wheezing or rhonchi appreciated.  ABDOMEN: Soft, nontender to palpate. Positive bowel sounds. No guarding or rebound appreciated.  NEUROLOGICAL: No focal neurological deficits appreciated. Cranial nerves are grossly intact.  EXTREMITIES: Left knee edema, tenderness to palpate. Ecchymosis appreciated over left inner thigh. Erythema appreciated over left lower extremity.   SKIN:  Grossly intact, warm and dry.      LABS/IMAGING AND MEDICATIONS:   Scheduled Meds:aspirin, 81 mg, oral, Daily  calcium carbonate, 500 mg, oral, Daily  ceFAZolin, 1 g, intravenous, q12h  famotidine, 20 mg, oral, Daily  lactobacillus acidophilus, 1 tablet, oral, BID  lidocaine, 1 patch, transdermal, Daily  lisinopril, 20 mg, oral, Daily  metoprolol tartrate, 25 mg, oral, q12h  polyethylene glycol, 17 g, oral, Daily  sennosides-docusate sodium, 1 tablet, oral, Nightly      PRN Meds:PRN medications: acetaminophen **OR** acetaminophen **OR** acetaminophen, benzonatate, guaiFENesin, HYDROmorphone, melatonin, ondansetron **OR** ondansetron, oxyCODONE, oxyCODONE    No lab exists for component: \"CBC\"   No lab exists for component: \"CMP\"   No lab exists for component: \"TROPONIN\"      Results from last 7 days   Lab Units 08/30/24  0431   INR  4.0*     No lab exists for component: \"LIPIDS\"       No lab exists for component: \"URINALYSIS\"          BMP:  Results from last 7 days   Lab Units 08/30/24  0431 08/29/24  1208 08/29/24  0108   SODIUM mmol/L 126* 129* 128*   POTASSIUM mmol/L 4.9 4.0 4.1   CHLORIDE mmol/L 94* 96* 96*   CO2 mmol/L 25 22 22   BUN mg/dL 20 20 25*   CREATININE mg/dL 0.93 1.02 1.13*       CBC:  Results from last 7 days   Lab Units 08/30/24  0431 08/29/24  0108   WBC AUTO x10*3/uL 14.0* 14.7*   RBC AUTO x10*6/uL 3.81* 3.52*   HEMOGLOBIN " "g/dL 11.4* 10.5*   HEMATOCRIT % 33.8* 31.0*   MCV fL 89 88   MCH pg 29.9 29.8   MCHC g/dL 33.7 33.9   RDW % 12.7 12.8   PLATELETS AUTO x10*3/uL 396 393       Cardiac Enzymes:           Hepatic Function Panel:  Results from last 7 days   Lab Units 08/29/24  0108   ALK PHOS U/L 71   ALT U/L 8   AST U/L 22   PROTEIN TOTAL g/dL 6.8   BILIRUBIN TOTAL mg/dL 0.7       Magnesium:        Pro-BNP:  No results found for: \"PROBNP\"    INR:  Results from last 7 days   Lab Units 08/30/24  0431 08/29/24  0108   PROTIME seconds 45.7* 38.2*   INR  4.0* 3.3*       TSH:  No results found for: \"TSH\"    Lipid Profile:        No lab exists for component: \"LABVLDL\"    HgbA1C:        Lactate Level:  No lab exists for component: \"LACTA\"    CMP:  Results from last 7 days   Lab Units 08/30/24  0431 08/29/24  1208 08/29/24  0108   SODIUM mmol/L 126* 129* 128*   POTASSIUM mmol/L 4.9 4.0 4.1   CHLORIDE mmol/L 94* 96* 96*   CO2 mmol/L 25 22 22   BUN mg/dL 20 20 25*   CREATININE mg/dL 0.93 1.02 1.13*   GLUCOSE mg/dL 123* 110* 112*   CALCIUM mg/dL 9.4 9.7 9.3   PROTEIN TOTAL g/dL  --   --  6.8   BILIRUBIN TOTAL mg/dL  --   --  0.7   ALK PHOS U/L  --   --  71   AST U/L  --   --  22   ALT U/L  --   --  8       Amylase:        Lipase:        ABG:        No lab exists for component: \"PO2\", \"PCO2\", \"HCO3\", \"BE\", \"O2SAT\"       "

## 2024-08-30 NOTE — PROGRESS NOTES
Occupational Therapy    Evaluation    Patient Name: Sommer Farmer  MRN: [id number]  Today's Date: 8/30/2024  Time Calculation  Start Time: 0959  Stop Time: 1020  Time Calculation (min): 21 min    Assessment  IP OT Assessment  End of Session Communication: Bedside nurse  End of Session Patient Position: Bed, 3 rail up, Alarm on (3 visitors present)  Plan:  Treatment Interventions: ADL retraining, Functional transfer training, Endurance training, Patient/family training, Equipment evaluation/education, Compensatory technique education  OT Frequency: 3 times per week  OT Discharge Recommendations: Moderate intensity level of continued care  OT - OK to Discharge: Yes (when medically appropriate)    Subjective   Current Problem:  1. Hemarthrosis        2. Cellulitis of left lower extremity        3. Sepsis without acute organ dysfunction, due to unspecified organism (Multi)          General:  General  Reason for Referral: ADL  Referred By: Francisco  Past Medical History Relevant to Rehab: Includes: Hematoma L knee from fall around 7/28/24, HTN, PVD, OA, CKD, Afib on warfarin, DVT.  Family/Caregiver Present:  (2 family members and roommate.)  Co-Treatment: PT  Co-Treatment Reason: safety  Prior to Session Communication: Bedside nurse  Patient Position Received: Bed, 3 rail up, Alarm on  General Comment: To ED 8/29 with L knee pain.  Admitted with L knee hemarthrosis, hyponatremia and sepsis 2nd to LLE cellulitis. Ortho attempted aspiration of L knee 8/30, unsuccessful due to coagulated blood.  Precautions:  LE Weight Bearing Status: Weight Bearing as Tolerated (WBAT LLE. Okay for gentle ROM as tolerated. Can use knee immobilizer for comfort.)  Medical Precautions: Fall precautions    Vitals:   at rest.  Up to 134 while seated EOB.      Pain:  Pain Assessment  Pain Assessment: 0-10  0-10 (Numeric) Pain Score: 10 - Worst possible pain  Pain Type: Acute pain  Pain Location: Knee  Pain Orientation: Left  Pain  Interventions: Repositioned, Rest, Elevated (RN notified of pt. pain)    Objective   Cognition:  Overall Cognitive Status: Within Functional Limits  Orientation Level: Oriented X4           Home Living:  Home Living Comments: Lives with grand daughter and friend/roommate.  2 steps with handrail to enter.  2nd floor full bath and bedroom.  11 steps with handrail to 2nd floor.  1 step down into an area that could be turned into a 1st floor set up. Has walk in shower and tub/shower.  Currently no seat/safety bars in showers.  Bishop states that she can get pt. seat, safety bars, ramp, BSC, etc. if needed. Owns FWW and quad cane.  Has used quad cane lately.  Independent with ADLs.  Shared IADLs.  1 recent falls.  Does not drive.        ADL:  Eating Assistance: Independent  Grooming Assistance: Independent  Bathing Assistance: Maximal  UE Dressing Assistance: Maximal  LE Dressing Assistance: Total  Toileting Assistance with Device: Total  Functional Assistance: Total  Activity Tolerance:  Activity Tolerance Comments: poor, limited by severe pain.  Pt. also tachy with HR up to 134 while seated EOB.  Bed Mobility/Transfers: Bed Mobility  Bed Mobility:  (2 max assist for sup to sit, scooting to EOB and sit to sup 2nd to severe pain.  Dependent to scoot to HOB.)    Transfers  Transfer: No      Functional Mobility:  Functional Mobility  Functional Mobility Performed: No  Sitting Balance:  Static Sitting Balance  Static Sitting-Comment/Number of Minutes: poor sitting balance 2nd to pain, and poor tolerance for sitting.  Pt. sat EOB ~ 2 mins fluctuating between max and mod assist for balance, but required total support of LLE.     Strength:  Strength Comments: 4/5 bilat. UEs       Extremities: RUE   RUE :  (AROM WFL) and LUE   LUE:  (AROM WFL)    Outcome Measures: [facility] Daily Activity  Putting on and taking off regular lower body clothing: Total  Bathing (including washing, rinsing, drying): A lot  Putting on and taking off  regular upper body clothing: A lot  Toileting, which includes using toilet, bedpan or urinal: Total  Taking care of personal grooming such as brushing teeth: A little  Eating Meals: None  Daily Activity - Total Score: 13      Education Documentation  Body Mechanics, taught by Cass Pichardo OT at 8/30/2024 12:57 PM.  Learner: Patient  Readiness: Acceptance  Method: Explanation  Response: Verbalizes Understanding    Precautions, taught by Cass Pichardo OT at 8/30/2024 12:57 PM.  Learner: Patient  Readiness: Acceptance  Method: Explanation  Response: Verbalizes Understanding    ADL Training, taught by Cass Pichardo OT at 8/30/2024 12:57 PM.  Learner: Patient  Readiness: Acceptance  Method: Explanation  Response: Verbalizes Understanding    Education Comments  No comments found.      Goals:   Encounter Problems       Encounter Problems (Active)       OT Goals       Mod assist bed mobility.        Start:  08/30/24    Expected End:  09/15/24            Mod assist sit/stand, bed/chair/commode with FWW.       Start:  08/30/24    Expected End:  09/15/24            Good dynamic sitting for ADL.        Start:  08/30/24    Expected End:  09/15/24            Poor (+) dynamic standing for ADL with UE support.        Start:  08/30/24    Expected End:  09/15/24            Tolerate 10 mins light functional activity.        Start:  08/30/24    Expected End:  09/15/24

## 2024-08-30 NOTE — CARE PLAN
The patient's goals for the shift include  rest    The clinical goals for the shift include Patient will report increased comfort throughout shift

## 2024-08-30 NOTE — PROGRESS NOTES
Patient seen and examined.  She had an opportunity to discuss options with the family regarding the left knee aspiration procedure.  She understands that the chronicity of the hemarthrosis may lead to inability to aspirate secondary to coagulation of the hemarthrosis.        Evaluation of the left knee finds no erythema.  There is general tenderness.  The knee sits in a position of 30 degrees of flexion.  There is palpable effusion that feels somewhat solid in nature.  Intact to sensory and motor distally.          Left knee hemarthrosis, solidified        Treatment options were discussed.  We talked about the option of nonoperative measures like ice and splint immobilization versus trial of aspiration of hemarthrosis.  After full discussion with the patient and with family at bedside she elects for aspiration.  That was performed with no significant aspirate obtained.  A scant amount of jellylike material consistent with coagulated blood was aspirated.  Band-Aid was placed and orders were written for ice and knee immobilizer.  Orthopedic team signing off for now.  Patient was offered follow-up as an outpatient with our knee specialist.  She apparently has an orthopedic surgeon and a scheduled appointment upcoming in September.  They prefer to keep that as their orthopedic follow-up.  We are available if necessary.  Orthopedic team will step into the background for now.  Please call with any questions or concerns.            Procedure:    Using aseptic technique an 18-gauge needle was used to attempt aspiration of the hemarthrosis.  A scant amount of jellylike purpleish black-colored aspirate was obtained consistent with coagulated hemarthrosis.  A Band-Aid was placed.

## 2024-08-30 NOTE — CONSULTS
Consults  Referred by Dr Sanjiv Hernandez MD: Hanna Liu MD    Reason For Consult  L leg cellulitis, UTI     History Of Present Illness  Sommer Farmer is a 89 y.o. female with past medical history of Graves' disease, chronic kidney disease, DVT, chronic anticoagulation with Coumadin, breast cancer, was admitted yesterday with left knee swelling and pain, discoloration, left leg redness of 3 weeks duration.  No fevers or chills.  Patient has been tachycardic with leukocytosis and hyponatremia.  Left knee x-ray with stable large left knee effusion.  Patient was admitted and was started on IV Ancef.  IV vancomycin was added today because of persistent tachycardia and leukocytosis.  An attempt to aspirate left knee today was unsuccessful.   Patient reports remarkable clinical improvement with decreased left leg redness.  She has mild nausea and constipation.  Positive left knee swelling and discomfort.  Left knee pain on movement.  No fevers or chills reported.  Patient has chronic asymptomatic bacteriuria.  Denies any dysuria or abdominal pain.      Past Medical History  She has no past medical history on file.    Surgical History  She has a past surgical history that includes CT angio abdomen pelvis w and or wo IV IV contrast (9/8/2019).     Social History     Occupational History    Not on file   Tobacco Use    Smoking status: Never    Smokeless tobacco: Never   Substance and Sexual Activity    Alcohol use: Never    Drug use: Never    Sexual activity: Not on file     Travel History   Travel since 07/30/24    No documented travel since 07/30/24            Family History  No family history on file.  Allergies  Proton pump inhibitors and Tamoxifen     Immunization History   Administered Date(s) Administered    Moderna SARS-CoV-2 Vaccination 05/03/2021, 06/01/2021     Medications  Home medications:  Medications Prior to Admission   Medication Sig Dispense Refill Last Dose    aspirin 81 mg chewable tablet Chew 1  tablet (81 mg) once daily.   8/28/2024 at am    cephalexin (Keflex) 500 mg capsule Take 1 capsule (500 mg) by mouth 2 times a day.   8/28/2024 at pm    cyclobenzaprine (Flexeril) 10 mg tablet Take 1 tablet (10 mg) by mouth as needed at bedtime for muscle spasms.   Past Week at pm    furosemide (Lasix) 20 mg tablet Take 1 tablet (20 mg) by mouth once daily.   8/28/2024 at am    lisinopril 20 mg tablet Take 1 tablet (20 mg) by mouth once daily.   8/28/2024 at am    methIMAzole (Tapazole) 5 mg tablet Take 0.5 tablets (2.5 mg) by mouth 3 times a week. TAKE 1/2 TABLET ON TUES, THURS, SAT AND SKIP DOSE ON MONDAYS, WEDNESDAYS, FRIDAYS, AND SUNDAYS   8/27/2024 at am    metoprolol tartrate (Lopressor) 25 mg tablet Take 1 tablet (25 mg) by mouth every 12 hours.   8/28/2024 at pm    raNITIdine (Zantac) 150 mg tablet Take by mouth 2 times a day as needed.   8/28/2024 at am    traMADol (Ultram) 50 mg tablet TAKE 1 TABLET BY MOUTH TWO TIMES A DAY AS NEEDED FOR PAIN FOR UP TO 30 DAYS.   8/28/2024 at pm    warfarin (Coumadin) 2.5 mg tablet Take 1 tablet (2.5 mg) by mouth once daily. On Mon, Tue, Wed, Fri, Sat, & Sun.   8/28/2024 at am    diphenhydrAMINE-acetaminophen (Tylenol PM)  mg per tablet Take 1 tablet by mouth as needed at bedtime for sleep.       warfarin (Coumadin) 5 mg tablet Take 1 tablet (5 mg) by mouth every 7 days. On Thursday 8/22/2024 at am     Current medications:  Scheduled medications  aspirin, 81 mg, oral, Daily  calcium carbonate, 500 mg, oral, Daily  ceFAZolin, 1 g, intravenous, q12h  famotidine, 20 mg, oral, Daily  lactobacillus acidophilus, 1 tablet, oral, BID  lidocaine, 1 patch, transdermal, Daily  lisinopril, 20 mg, oral, Daily  metoprolol tartrate, 25 mg, oral, q12h  polyethylene glycol, 17 g, oral, Daily  sennosides-docusate sodium, 1 tablet, oral, Nightly  [START ON 8/31/2024] vancomycin, 1,000 mg, intravenous, q24h  vancomycin, 1,500 mg, intravenous, Once      Continuous medications     PRN  medications  PRN medications: acetaminophen **OR** acetaminophen **OR** acetaminophen, benzonatate, guaiFENesin, HYDROmorphone, melatonin, ondansetron **OR** ondansetron, oxyCODONE, oxyCODONE, vancomycin    Review of Systems  14 system review is negative otherwise  Objective  Range of Vitals (last 24 hours)  Heart Rate:  [100-116]   Temp:  [36.5 °C (97.7 °F)-37.2 °C (99 °F)]   Resp:  [18-20]   BP: (140-188)/(62-79)   SpO2:  [95 %-96 %]   Daily Weight  08/29/24 : 63 kg (139 lb)    Body mass index is 25.42 kg/m².     Physical Exam  Vitals:    08/29/24 1448 08/29/24 2011 08/29/24 2257 08/30/24 0733   BP: 153/67 (!) 188/79 143/64 140/62   BP Location: Right arm Right arm Right arm Right arm   Patient Position: Lying Lying Lying Lying   Pulse: 100 103 109 (!) 116   Resp: 18 20 18 18   Temp: 37.2 °C (99 °F) 36.5 °C (97.7 °F) 36.5 °C (97.7 °F) 37.1 °C (98.8 °F)   TempSrc: Temporal Temporal Temporal Temporal   SpO2: 95% 96% 96% 96%   Weight:       Height:         General Appearance: alert and oriented to person, place and time, well-developed and well-nourished, in no acute distress  Skin: warm and dry, no rash.   Head: normocephalic and atraumatic  Eyes: anicteric sclerae  ENT:  normal mucous membranes. No oral thrush  Lungs: normal respiratory effort, clear lungs  Heart normal S1-S2 systolic ejection murmur grade 2/6 to 3/6 heard all over the precordium  Abdomen: soft, no tenderness  Left knee swelling without erythema or warmth   Left thigh ecchymosis   no leg edema  No erythema, no tenderness  PureWick with yellow urine   Labs  Results from last 72 hours   Lab Units 08/30/24  0431 08/29/24  0108   WBC AUTO x10*3/uL 14.0* 14.7*   HEMOGLOBIN g/dL 11.4* 10.5*   HEMATOCRIT % 33.8* 31.0*   PLATELETS AUTO x10*3/uL 396 393   NEUTROS PCT AUTO %  --  82.2   LYMPHS PCT AUTO %  --  9.0   MONOS PCT AUTO %  --  5.5   EOS PCT AUTO %  --  2.4     Results from last 72 hours   Lab Units 08/30/24  0431 08/29/24  1208 08/29/24  0108    SODIUM mmol/L 126* 129* 128*   POTASSIUM mmol/L 4.9 4.0 4.1   CHLORIDE mmol/L 94* 96* 96*   CO2 mmol/L 25 22 22   BUN mg/dL 20 20 25*   CREATININE mg/dL 0.93 1.02 1.13*   GLUCOSE mg/dL 123* 110* 112*   CALCIUM mg/dL 9.4 9.7 9.3   ANION GAP mmol/L 12 15 14   EGFR mL/min/1.73m*2 58* 52* 46*     Results from last 72 hours   Lab Units 08/29/24  0108   ALK PHOS U/L 71   BILIRUBIN TOTAL mg/dL 0.7   PROTEIN TOTAL g/dL 6.8   ALT U/L 8   AST U/L 22   ALBUMIN g/dL 3.5   Urinalysis with greater than 50 white blood cells  Urine culture is negative  Microbiology  Blood cultures done yesterday are negative  XR knee left 4+ views    Result Date: 8/29/2024  Interpreted By:  Antonia Rucker, STUDY: XR KNEE LEFT 4+ VIEWS; ;  8/29/2024 3:03 am   INDICATION: Signs/Symptoms:pain, edema left knee.   COMPARISON: 08/04/2024, 11/10/2011   ACCESSION NUMBER(S): [id number]   ORDERING CLINICIAN: KEVIN GOLDEN   FINDINGS: Four views left knee. Chronic irregularity of the articular surface of the patella and lateral femoral condyle. Very large knee joint effusion, similar to prior. Alignment of the knee is within normal limits. Atherosclerotic vascular calcifications are noted.       No acute fracture or malalignment.   Chronic irregularity of the articular surface of the patella and lateral femoral condyle, likely related to old osteochondral injury and superimposed degenerative changes.   Grossly stable very large joint effusion.   MACRO: None   Signed by: Antonia Rucker 8/29/2024 3:40 AM Dictation workstation:   [id number]    CT angio lower extremity left w and or wo IV contrast    Result Date: 8/29/2024  Interpreted By:  Antonia Rucker, STUDY: CT ANGIO LOWER EXTREMITY LEFT W AND OR WO IV CONTRAST;  8/29/2024 2:57 am   INDICATION: Signs/Symptoms:left knee/leg edema, hx of hematoma.   COMPARISON: CT left knee 08/05/2024   ACCESSION NUMBER(S): [id number]   ORDERING CLINICIAN: KEVIN GOLDEN   TECHNIQUE: Thin-section postcontrast axial  images from above the aortic bifurcation through the left lower extremity. Sagittal and coronal reconstructions. Maximum intensity projection images were obtained at a separate workstation.   FINDINGS: Vascular: The aortic bifurcation is unremarkable. No significant stenosis of the imaged right common, internal and external iliac arteries. The right common femoral artery demonstrates mild calcification with no significant stenosis. The right profunda femoris is patent with no significant stenosis. Calcification with areas of mild stenosis of the distal right femoral and popliteal arteries.   Left lower extremity: Common iliac artery: Patent. No hemodynamically significant stenosis. Internal iliac artery: Patent. No hemodynamically significant stenosis. External iliac artery: Patent. No hemodynamically significant stenosis. Common femoral artery: Patent. No hemodynamically significant stenosis. Profunda femoris artery: Patent. No hemodynamically significant stenosis. Femoral artery: Patent. Mild distal stenosis. Popliteal artery: Calcifications with severe stenosis at the level of the femoral condyles. Tibioperoneal vessels: Limited evaluation of the tibioperoneal vessels due to dense calcification. The distal anterior tibial artery and dorsalis pedis artery are grossly patent. No definite opacification of the posterior tibial artery or plantar branches.   Grossly stable large complex, heterogeneous left knee joint effusion suggestive of hemarthrosis. No evidence of active hemorrhage. No arterial pseudoaneurysm seen. No acute osseous abnormality. Moderate left knee osteoarthrosis. Nonspecific subcutaneous edema from the mid left leg through the foot. Fatty atrophy of the left medial soleus. Right tibial intramedullary nail noted.   Imaged intrapelvic structures are remarkable for a pessary in the vagina. There is central hypodensity in the uterus measuring 1.5 cm. Rectal anastomosis noted. Fat containing midline  ventral hernia.       No evidence of arterial vascular injury of the left lower extremity.   Large left knee hemarthrosis, not significantly changed from 08/05/2024. No evidence of acute osseous abnormality.   Single vessel left lower extremity runoff, with patency of the dorsalis pedis artery. Severe stenosis of the left popliteal artery.   Nonspecific left lower extremity subcutaneous edema.   Focal hypodensity in the central uterus suspicious for endometrial thickening fluid. Evaluation follow-up nonemergent pelvic ultrasound is recommended.   MACRO: Critical Finding:  See findings. Notification was initiated on 8/29/2024 at 3:32 am by  Antonia Rucker.  (**-YCF-**) Instructions:   Signed by: Antonia Rucker 8/29/2024 3:33 AM Dictation workstation:   [id number]    Lower extremity venous duplex left    Result Date: 8/29/2024  Interpreted By:  Shannon Rascon, STUDY: [facility] US LOWER EXTREMITY VENOUS DUPLEX LEFT;  8/29/2024 2:39 am   INDICATION: Signs/Symptoms:leg edema, erythema, hx of clots.   COMPARISON: Venous Doppler ultrasound left lower extremity 06/21/2015.   ACCESSION NUMBER(S): [id number]   ORDERING CLINICIAN: KEVIN GOLDEN   TECHNIQUE: Vascular ultrasound of the  left lower extremity was performed. Real time compression views as well as Gray scale, color Doppler and spectral Doppler waveform analysis was performed.   FINDINGS: Evaluation of the visualized portions of the  left common femoral vein, proximal, mid, and distal femoral vein, and popliteal vein were performed.  Evaluation of the visualized portions of the  posterior tibial and peroneal veins were also performed.  In addition, evaluation of the contralateral common femoral vein was performed.   Limitations:  There is suboptimal visualization of the calf veins due to soft tissue edema and patient's body habitus.   The evaluated veins demonstrate normal compressibility. There is intact venous flow demonstrating normal respiratory variability and  normal augmentation of flow with calf compression. Therefore, there is no ultrasonographic evidence for deep vein thrombosis within the evaluated veins.       1.  No sonographic evidence for deep vein thrombosis within the evaluated veins of the left lower extremity from the inguinal region to the popliteal region. Suboptimal visualization of the left calf veins on this exam.     MACRO: None.   Signed by: Shannon Rascon 8/29/2024 3:09 AM Dictation workstation:   [id number]        Assessment/Plan       IMPRESSION:    Left leg cellulitis, currently resolving  Left knee hemarthrosis  Asymptomatic bacteriuria    PLAN:  Make IV Ancef 1 g Q8 hrs  DC IV vancomycin  Follow-up clinically  Reactive leukocytosis could be related to hematoma  Change to p.o. Keflex on discharge      Jesika Hooper MD

## 2024-08-30 NOTE — CONSULTS
Vancomycin Dosing by Pharmacy- INITIAL    Sommer Farmer is a 89 y.o. year old female who Pharmacy has been consulted for vancomycin dosing for cellulitis, skin and soft tissue. Based on the patient's indication and renal status this patient will be dosed based on a goal AUC of 400-600.     Renal function is currently stable.    Visit Vitals  /62 (BP Location: Right arm, Patient Position: Lying)   Pulse (!) 116   Temp 37.1 °C (98.8 °F) (Temporal)   Resp 18        Lab Results   Component Value Date    CREATININE 0.93 2024    CREATININE 1.02 2024    CREATININE 1.13 (H) 2024    CREATININE 0.93 2024        Patient weight is as follows:   Vitals:    24 0940   Weight: 63 kg (139 lb)       Cultures:  No results found for the encounter in last 14 days.        I/O last 3 completed shifts:  In: 1516.7 (24.1 mL/kg) [P.O.:360; I.V.:1006.7 (16 mL/kg); IV Piggyback:150]  Out: 150 (2.4 mL/kg) [Urine:150 (0.1 mL/kg/hr)]  Weight: 63 kg   I/O during current shift:  I/O this shift:  In: 240 [P.O.:240]  Out: -     Temp (24hrs), Av.8 °C (98.3 °F), Min:36.5 °C (97.7 °F), Max:37.2 °C (99 °F)         Assessment/Plan     Patient will be given a loading dose of 1500 mg today at 1400.  Will initiate vancomycin maintenance,  1000 mg every 24 hours.    This dosing regimen is predicted by [employer] to result in the following pharmacokinetic parameters:  <Loading dose: 1500 mg at 14:00 2024.  Regimen: 1000 mg IV every 24 hours.  Start time: 14:00 on 2024  Exposure target: AUC24 (range)400-600 mg/L.hr   AUC24,ss: 523 mg/L.hr  Probability of AUC24 > 400: 79 %  Ctrough,ss: 16.4 mg/L  Probability of Ctrough,ss > 20: 31 %  Probability of nephrotoxicity (Lodise NATIVIDAD ): 12 %    Follow-up level will be ordered on 2024 at 0500 and 1000 unless clinically indicated sooner.  Will continue to monitor renal function daily while on vancomycin and order serum creatinine at least every 48 hours if not  already ordered.  Follow for continued vancomycin needs, clinical response, and signs/symptoms of toxicity.       AMY Goss. Ph.

## 2024-08-31 ENCOUNTER — APPOINTMENT (OUTPATIENT)
Dept: RADIOLOGY | Facility: HOSPITAL | Age: 89
DRG: 871 | End: 2024-08-31
Payer: MEDICARE

## 2024-08-31 ENCOUNTER — APPOINTMENT (OUTPATIENT)
Dept: CARDIOLOGY | Facility: HOSPITAL | Age: 89
End: 2024-08-31
Payer: MEDICARE

## 2024-08-31 LAB
ANION GAP SERPL CALC-SCNC: 12 MMOL/L (ref 10–20)
ANION GAP SERPL CALC-SCNC: 9 MMOL/L (ref 10–20)
ATRIAL RATE: 84 BPM
BUN SERPL-MCNC: 18 MG/DL (ref 6–23)
BUN SERPL-MCNC: 21 MG/DL (ref 6–23)
CALCIUM SERPL-MCNC: 8.6 MG/DL (ref 8.6–10.3)
CALCIUM SERPL-MCNC: 8.7 MG/DL (ref 8.6–10.3)
CARDIAC TROPONIN I PNL SERPL HS: 674 NG/L (ref 0–13)
CHLORIDE SERPL-SCNC: 95 MMOL/L (ref 98–107)
CHLORIDE SERPL-SCNC: 95 MMOL/L (ref 98–107)
CK SERPL-CCNC: 54 U/L (ref 0–215)
CO2 SERPL-SCNC: 20 MMOL/L (ref 21–32)
CO2 SERPL-SCNC: 23 MMOL/L (ref 21–32)
CREAT SERPL-MCNC: 0.85 MG/DL (ref 0.5–1.05)
CREAT SERPL-MCNC: 0.86 MG/DL (ref 0.5–1.05)
EGFRCR SERPLBLD CKD-EPI 2021: 64 ML/MIN/1.73M*2
EGFRCR SERPLBLD CKD-EPI 2021: 65 ML/MIN/1.73M*2
ERYTHROCYTE [DISTWIDTH] IN BLOOD BY AUTOMATED COUNT: 12.5 % (ref 11.5–14.5)
GLUCOSE SERPL-MCNC: 101 MG/DL (ref 74–99)
GLUCOSE SERPL-MCNC: 109 MG/DL (ref 74–99)
HCT VFR BLD AUTO: 28.1 % (ref 36–46)
HGB BLD-MCNC: 9.6 G/DL (ref 12–16)
INR PPP: 3.1 (ref 0.9–1.1)
MAGNESIUM SERPL-MCNC: 1.4 MG/DL (ref 1.6–2.4)
MCH RBC QN AUTO: 29.7 PG (ref 26–34)
MCHC RBC AUTO-ENTMCNC: 34.2 G/DL (ref 32–36)
MCV RBC AUTO: 87 FL (ref 80–100)
NRBC BLD-RTO: 0 /100 WBCS (ref 0–0)
P AXIS: 25 DEGREES
P OFFSET: 165 MS
P ONSET: 115 MS
PLATELET # BLD AUTO: 345 X10*3/UL (ref 150–450)
POTASSIUM SERPL-SCNC: 4.3 MMOL/L (ref 3.5–5.3)
POTASSIUM SERPL-SCNC: 4.4 MMOL/L (ref 3.5–5.3)
PR INTERVAL: 212 MS
PROTHROMBIN TIME: 35.3 SECONDS (ref 9.8–12.8)
Q ONSET: 221 MS
QRS COUNT: 14 BEATS
QRS DURATION: 104 MS
QT INTERVAL: 366 MS
QTC CALCULATION(BAZETT): 432 MS
QTC FREDERICIA: 409 MS
R AXIS: -25 DEGREES
RBC # BLD AUTO: 3.23 X10*6/UL (ref 4–5.2)
SODIUM SERPL-SCNC: 123 MMOL/L (ref 136–145)
SODIUM SERPL-SCNC: 123 MMOL/L (ref 136–145)
T AXIS: -16 DEGREES
T OFFSET: 404 MS
VENTRICULAR RATE: 84 BPM
WBC # BLD AUTO: 10.9 X10*3/UL (ref 4.4–11.3)

## 2024-08-31 PROCEDURE — 2500000001 HC RX 250 WO HCPCS SELF ADMINISTERED DRUGS (ALT 637 FOR MEDICARE OP): Performed by: HOSPITALIST

## 2024-08-31 PROCEDURE — 99223 1ST HOSP IP/OBS HIGH 75: CPT | Performed by: INTERNAL MEDICINE

## 2024-08-31 PROCEDURE — 2500000001 HC RX 250 WO HCPCS SELF ADMINISTERED DRUGS (ALT 637 FOR MEDICARE OP): Performed by: NURSE PRACTITIONER

## 2024-08-31 PROCEDURE — 83735 ASSAY OF MAGNESIUM: CPT | Performed by: HOSPITALIST

## 2024-08-31 PROCEDURE — 85027 COMPLETE CBC AUTOMATED: CPT | Performed by: STUDENT IN AN ORGANIZED HEALTH CARE EDUCATION/TRAINING PROGRAM

## 2024-08-31 PROCEDURE — 1200000002 HC GENERAL ROOM WITH TELEMETRY DAILY

## 2024-08-31 PROCEDURE — 84484 ASSAY OF TROPONIN QUANT: CPT | Performed by: HOSPITALIST

## 2024-08-31 PROCEDURE — 93010 ELECTROCARDIOGRAM REPORT: CPT | Performed by: INTERNAL MEDICINE

## 2024-08-31 PROCEDURE — 99232 SBSQ HOSP IP/OBS MODERATE 35: CPT | Performed by: HOSPITALIST

## 2024-08-31 PROCEDURE — 2500000005 HC RX 250 GENERAL PHARMACY W/O HCPCS: Performed by: STUDENT IN AN ORGANIZED HEALTH CARE EDUCATION/TRAINING PROGRAM

## 2024-08-31 PROCEDURE — 2500000004 HC RX 250 GENERAL PHARMACY W/ HCPCS (ALT 636 FOR OP/ED): Mod: JZ | Performed by: INTERNAL MEDICINE

## 2024-08-31 PROCEDURE — 36415 COLL VENOUS BLD VENIPUNCTURE: CPT | Performed by: HOSPITALIST

## 2024-08-31 PROCEDURE — 80048 BASIC METABOLIC PNL TOTAL CA: CPT | Performed by: INTERNAL MEDICINE

## 2024-08-31 PROCEDURE — 71275 CT ANGIOGRAPHY CHEST: CPT | Performed by: RADIOLOGY

## 2024-08-31 PROCEDURE — 71275 CT ANGIOGRAPHY CHEST: CPT

## 2024-08-31 PROCEDURE — 85610 PROTHROMBIN TIME: CPT | Performed by: HOSPITALIST

## 2024-08-31 PROCEDURE — 99222 1ST HOSP IP/OBS MODERATE 55: CPT | Performed by: INTERNAL MEDICINE

## 2024-08-31 PROCEDURE — 80048 BASIC METABOLIC PNL TOTAL CA: CPT | Performed by: STUDENT IN AN ORGANIZED HEALTH CARE EDUCATION/TRAINING PROGRAM

## 2024-08-31 PROCEDURE — 82550 ASSAY OF CK (CPK): CPT | Performed by: HOSPITALIST

## 2024-08-31 PROCEDURE — 36415 COLL VENOUS BLD VENIPUNCTURE: CPT | Performed by: STUDENT IN AN ORGANIZED HEALTH CARE EDUCATION/TRAINING PROGRAM

## 2024-08-31 PROCEDURE — 93005 ELECTROCARDIOGRAM TRACING: CPT

## 2024-08-31 PROCEDURE — 2500000004 HC RX 250 GENERAL PHARMACY W/ HCPCS (ALT 636 FOR OP/ED): Performed by: STUDENT IN AN ORGANIZED HEALTH CARE EDUCATION/TRAINING PROGRAM

## 2024-08-31 PROCEDURE — 2500000004 HC RX 250 GENERAL PHARMACY W/ HCPCS (ALT 636 FOR OP/ED): Performed by: HOSPITALIST

## 2024-08-31 PROCEDURE — 2550000001 HC RX 255 CONTRASTS: Performed by: HOSPITALIST

## 2024-08-31 RX ORDER — MAGNESIUM SULFATE HEPTAHYDRATE 40 MG/ML
2 INJECTION, SOLUTION INTRAVENOUS ONCE
Status: COMPLETED | OUTPATIENT
Start: 2024-08-31 | End: 2024-08-31

## 2024-08-31 ASSESSMENT — COGNITIVE AND FUNCTIONAL STATUS - GENERAL
HELP NEEDED FOR BATHING: A LITTLE
DRESSING REGULAR UPPER BODY CLOTHING: A LITTLE
WALKING IN HOSPITAL ROOM: A LOT
TURNING FROM BACK TO SIDE WHILE IN FLAT BAD: A LITTLE
DAILY ACTIVITIY SCORE: 18
TOILETING: A LITTLE
MOVING TO AND FROM BED TO CHAIR: A LITTLE
CLIMB 3 TO 5 STEPS WITH RAILING: TOTAL
CLIMB 3 TO 5 STEPS WITH RAILING: TOTAL
PERSONAL GROOMING: A LITTLE
WALKING IN HOSPITAL ROOM: A LOT
TURNING FROM BACK TO SIDE WHILE IN FLAT BAD: A LITTLE
HELP NEEDED FOR BATHING: A LITTLE
EATING MEALS: A LITTLE
DRESSING REGULAR LOWER BODY CLOTHING: A LITTLE
TOILETING: A LITTLE
MOBILITY SCORE: 16
DRESSING REGULAR UPPER BODY CLOTHING: A LITTLE
MOBILITY SCORE: 16
STANDING UP FROM CHAIR USING ARMS: A LITTLE
DAILY ACTIVITIY SCORE: 18
EATING MEALS: A LITTLE
STANDING UP FROM CHAIR USING ARMS: A LITTLE
PERSONAL GROOMING: A LITTLE
DRESSING REGULAR LOWER BODY CLOTHING: A LITTLE
MOVING TO AND FROM BED TO CHAIR: A LITTLE

## 2024-08-31 ASSESSMENT — PAIN SCALES - GENERAL
PAINLEVEL_OUTOF10: 9
PAINLEVEL_OUTOF10: 6
PAINLEVEL_OUTOF10: 6
PAINLEVEL_OUTOF10: 0 - NO PAIN

## 2024-08-31 ASSESSMENT — PAIN DESCRIPTION - LOCATION: LOCATION: KNEE

## 2024-08-31 ASSESSMENT — ENCOUNTER SYMPTOMS
NEUROLOGICAL NEGATIVE: 1
WOUND: 0
CARDIOVASCULAR NEGATIVE: 1
RESPIRATORY NEGATIVE: 1
GASTROINTESTINAL NEGATIVE: 1
PSYCHIATRIC NEGATIVE: 1
ROS GI COMMENTS: AS IN HPI
EYES NEGATIVE: 1
ENDOCRINE NEGATIVE: 1
COLOR CHANGE: 1
MUSCULOSKELETAL NEGATIVE: 1
CONSTITUTIONAL NEGATIVE: 1

## 2024-08-31 ASSESSMENT — PAIN DESCRIPTION - ORIENTATION: ORIENTATION: LEFT

## 2024-08-31 ASSESSMENT — PAIN SCALES - PAIN ASSESSMENT IN ADVANCED DEMENTIA (PAINAD): TOTALSCORE: MEDICATION (SEE MAR)

## 2024-08-31 NOTE — PROGRESS NOTES
History Of Present Illness  Sommer Farmer is a 89 y.o. female          Left leg cellulitis       CT shows  Large left knee hemarthrosis, not significantly changed from 08/05/2024     No fevers    Past Medical History  She has no past medical history on file.    Surgical History  She has a past surgical history that includes CT angio abdomen pelvis w and or wo IV IV contrast (9/8/2019).     Social History  She reports that she has never smoked. She has never used smokeless tobacco. She reports that she does not drink alcohol and does not use drugs.      Family History  No family history on file.  Allergies  Proton pump inhibitors and Tamoxifen     Immunization History   Administered Date(s) Administered    Moderna SARS-CoV-2 Vaccination 05/03/2021, 06/01/2021     Review of Systems   Respiratory: Negative.     Cardiovascular:  Positive for leg swelling.   Gastrointestinal: Negative.    Skin:  Positive for color change. Negative for wound.        Physical Exam  Constitutional:       Appearance: She is not ill-appearing.   Cardiovascular:      Heart sounds: Normal heart sounds. No murmur heard.  Pulmonary:      Effort: Pulmonary effort is normal. No respiratory distress.      Breath sounds: Normal breath sounds. No wheezing, rhonchi or rales.   Abdominal:      General: Abdomen is flat. Bowel sounds are normal. There is no distension.      Palpations: Abdomen is soft. There is no mass.      Tenderness: There is no abdominal tenderness. There is no guarding.   Musculoskeletal:         General: Swelling present.      Left lower leg: Edema present.   Skin:     Findings: Erythema present.          Range of Vitals (last 24 hours)  Heart Rate:  [78-96]   Temp:  [36.3 °C (97.3 °F)-37.3 °C (99.1 °F)]   Resp:  [18-20]   BP: (133-152)/(60-67)   SpO2:  [93 %-98 %]     Relevant Results  Results from last 72 hours   Lab Units 08/31/24  0401 08/30/24  0431 08/29/24  0108   WBC AUTO x10*3/uL 10.9   < > 14.7*   HEMOGLOBIN g/dL  "9.6*   < > 10.5*   HEMATOCRIT % 28.1*   < > 31.0*   PLATELETS AUTO x10*3/uL 345   < > 393   NEUTROS PCT AUTO %  --   --  82.2   LYMPHS PCT AUTO %  --   --  9.0   MONOS PCT AUTO %  --   --  5.5   EOS PCT AUTO %  --   --  2.4    < > = values in this interval not displayed.     Results from last 72 hours   Lab Units 08/31/24  0401   SODIUM mmol/L 123*   POTASSIUM mmol/L 4.3   CHLORIDE mmol/L 95*   CO2 mmol/L 23   BUN mg/dL 21   CREATININE mg/dL 0.85   GLUCOSE mg/dL 109*   CALCIUM mg/dL 8.7   ANION GAP mmol/L 9*   EGFR mL/min/1.73m*2 65     Results from last 72 hours   Lab Units 08/29/24  0108   ALK PHOS U/L 71   BILIRUBIN TOTAL mg/dL 0.7   PROTEIN TOTAL g/dL 6.8   ALT U/L 8   AST U/L 22   ALBUMIN g/dL 3.5     Estimated Creatinine Clearance: 37.6 mL/min (by C-G formula based on SCr of 0.85 mg/dL).  No results found for: \"CRP\", \"SEDRATE\"  No results found for: \"HIV1X2\", \"HIVCONF\", \"SHNXIY9IL\"  No results found for: \"HCVPCRQUANT\"  Cultures  No results found for the last 14 days.    XR knee left 4+ views     Result Date: 8/29/2024  Interpreted By:  Antonia Rucker, STUDY: XR KNEE LEFT 4+ VIEWS; ;  8/29/2024 3:03 am   INDICATION: Signs/Symptoms:pain, edema left knee.   COMPARISON: 08/04/2024, 11/10/2011   ACCESSION NUMBER(S): [id number]   ORDERING CLINICIAN: KEVIN GOLEDN   FINDINGS: Four views left knee. Chronic irregularity of the articular surface of the patella and lateral femoral condyle. Very large knee joint effusion, similar to prior. Alignment of the knee is within normal limits. Atherosclerotic vascular calcifications are noted.        No acute fracture or malalignment.   Chronic irregularity of the articular surface of the patella and lateral femoral condyle, likely related to old osteochondral injury and superimposed degenerative changes.   Grossly stable very large joint effusion.   MACRO: None   Signed by: Antonia Rucker 8/29/2024 3:40 AM Dictation workstation:   [id number]     CT angio lower extremity left " w and or wo IV contrast     Result Date: 8/29/2024  Interpreted By:  Antonia Rucker, STUDY: CT ANGIO LOWER EXTREMITY LEFT W AND OR WO IV CONTRAST;  8/29/2024 2:57 am   INDICATION: Signs/Symptoms:left knee/leg edema, hx of hematoma.   COMPARISON: CT left knee 08/05/2024   ACCESSION NUMBER(S): [id number]   ORDERING CLINICIAN: KEVIN GOLDEN   TECHNIQUE: Thin-section postcontrast axial images from above the aortic bifurcation through the left lower extremity. Sagittal and coronal reconstructions. Maximum intensity projection images were obtained at a separate workstation.   FINDINGS: Vascular: The aortic bifurcation is unremarkable. No significant stenosis of the imaged right common, internal and external iliac arteries. The right common femoral artery demonstrates mild calcification with no significant stenosis. The right profunda femoris is patent with no significant stenosis. Calcification with areas of mild stenosis of the distal right femoral and popliteal arteries.   Left lower extremity: Common iliac artery: Patent. No hemodynamically significant stenosis. Internal iliac artery: Patent. No hemodynamically significant stenosis. External iliac artery: Patent. No hemodynamically significant stenosis. Common femoral artery: Patent. No hemodynamically significant stenosis. Profunda femoris artery: Patent. No hemodynamically significant stenosis. Femoral artery: Patent. Mild distal stenosis. Popliteal artery: Calcifications with severe stenosis at the level of the femoral condyles. Tibioperoneal vessels: Limited evaluation of the tibioperoneal vessels due to dense calcification. The distal anterior tibial artery and dorsalis pedis artery are grossly patent. No definite opacification of the posterior tibial artery or plantar branches.   Grossly stable large complex, heterogeneous left knee joint effusion suggestive of hemarthrosis. No evidence of active hemorrhage. No arterial pseudoaneurysm seen. No acute osseous  abnormality. Moderate left knee osteoarthrosis. Nonspecific subcutaneous edema from the mid left leg through the foot. Fatty atrophy of the left medial soleus. Right tibial intramedullary nail noted.   Imaged intrapelvic structures are remarkable for a pessary in the vagina. There is central hypodensity in the uterus measuring 1.5 cm. Rectal anastomosis noted. Fat containing midline ventral hernia.        No evidence of arterial vascular injury of the left lower extremity.   Large left knee hemarthrosis, not significantly changed from 08/05/2024. No evidence of acute osseous abnormality.   Single vessel left lower extremity runoff, with patency of the dorsalis pedis artery. Severe stenosis of the left popliteal artery.   Nonspecific left lower extremity subcutaneous edema.   Focal hypodensity in the central uterus suspicious for endometrial thickening fluid. Evaluation follow-up nonemergent pelvic ultrasound is recommended.   MACRO: Critical Finding:  See findings. Notification was initiated on 8/29/2024 at 3:32 am by  Antonia Rucker.  (**-YCF-**) Instructions:   Signed by: Antonia Rucker 8/29/2024 3:33 AM Dictation workstation:   [id number]     Lower extremity venous duplex left     Result Date: 8/29/2024  Interpreted By:  Shannon Rascon, STUDY: [facility] US LOWER EXTREMITY VENOUS DUPLEX LEFT;  8/29/2024 2:39 am   INDICATION: Signs/Symptoms:leg edema, erythema, hx of clots.   COMPARISON: Venous Doppler ultrasound left lower extremity 06/21/2015.   ACCESSION NUMBER(S): [id number]   ORDERING CLINICIAN: KEVIN GOLDEN   TECHNIQUE: Vascular ultrasound of the  left lower extremity was performed. Real time compression views as well as Gray scale, color Doppler and spectral Doppler waveform analysis was performed.   FINDINGS: Evaluation of the visualized portions of the  left common femoral vein, proximal, mid, and distal femoral vein, and popliteal vein were performed.  Evaluation of the visualized portions of the   posterior tibial and peroneal veins were also performed.  In addition, evaluation of the contralateral common femoral vein was performed.   Limitations:  There is suboptimal visualization of the calf veins due to soft tissue edema and patient's body habitus.   The evaluated veins demonstrate normal compressibility. There is intact venous flow demonstrating normal respiratory variability and normal augmentation of flow with calf compression. Therefore, there is no ultrasonographic evidence for deep vein thrombosis within the evaluated veins.        1.  No sonographic evidence for deep vein thrombosis within the evaluated veins of the left lower extremity from the inguinal region to the popliteal region. Suboptimal visualization of the left calf veins on this exam.     MACRO: None.   Signed by: Shannon Rascon 8/29/2024 3:09 AM Dictation workstation:   [id number]         Assessment/Plan     Left leg cellulitis     Ancef

## 2024-08-31 NOTE — CONSULTS
CARDIOLOGY CONSULTATION NOTE       Patient:     Sommer Farmer    YOB: 1933  MRN:     [id number]    Date:     August 31, 2024    Consulting cardiologist:    Rico Moy MD , [facility]     Primary cardiologist: Dr. Billy, [facility] cardiology    Reason for Consultation: Chest pain, elevated troponins.    IMPRESSION:      Chest pain symptoms  Tachycardia  Non-ST elevation MI, elevated troponins  Atrial fibrillation, prior history with COVID, question recurrence  Left lower extremity cellulitis with sepsis  Left knee hemarthrosis  Elevated D-dimer  Hyponatremia  Hypertension  Anemia  History of prior deep venous thrombosis, 2021, negative venous duplex 8/20/2024  GERD  Varicose vein history  Breast cancer history of mastectomy 2006  COVID pneumonia January 2021  Degenerative joint disease  Prior ventral hernia repair  Prior appendectomy  Tobacco abuse history  Family history of coronary artery disease  Otherwise as per assessment below.    RECOMMENDATIONS:      Cardiovascular Supportive Care.  Telemetry Monitoring.  Serial Laboratories   Echocardiogram.  CTA chest, rule out pulmonary embolism.  Cardiac catheterization possibly this admission, versus as outpatient.  Further Recommendations to Follow.  See Orders.    HPI:     Electronic medical records were reviewed. Patient was interviewed and examined.    The patient's problems are listed in the impression above.       Sommer Farmer  was seen in cardiology consultation at the [facility] 8/31/2024.      Sommer Farmer is a 89 y.o. female presenting with left knee pain.  Patient states that she has had a hematoma in her left knee for the last 3 weeks.  She was evaluated and states that it was getting better.  Today she returns with worsening pain.  It has been limiting her ambulation.  Her left calf is also gotten more swollen and red.  She denies any drainage from the area.  Denies any fever or chills.     ER Course: BP  158/58, , RR 29, T36.9.  Labs notable for sodium 128, creatinine 1.13, BUN 25, INR 3.3, WBC 14.7.  X-ray of left knee showed grossly stable large joint effusion.  CT angio of left lower extremity was obtained which showed no evidence of arterial vascular injury and also revealed large left knee hemarthrosis not significantly changed.  Patient was given pain control and started on Ancef.    Patient denies Chest Pain, SOB, Lightheadedness, Dizziness, TIA or CVA symptoms.  No CHF or Edema.  No Palpitations.  No GI,  or Bleeding Issues. No Recent Fever or Chills.     Cardiovascular and general review of systems is otherwise negative.      Allergies:     Allergies   Allergen Reactions    Proton Pump Inhibitors Rash     protonix    gas symptoms    Tamoxifen Rash and Unknown        Medications:     Current Outpatient Medications   Medication Instructions    aspirin 81 mg chewable tablet 1 tablet, oral, Daily    cephalexin (KEFLEX) 500 mg, oral, 2 times daily    cyclobenzaprine (FLEXERIL) 10 mg, oral, Nightly PRN    diphenhydrAMINE-acetaminophen (Tylenol PM)  mg per tablet 1 tablet, oral, Nightly PRN    furosemide (LASIX) 20 mg, oral, Daily RT    lisinopril 20 mg, oral, Daily RT    methIMAzole (Tapazole) 5 mg tablet Take 0.5 tablets (2.5 mg) by mouth 3 times a week. TAKE 1/2 TABLET ON TUES, THURS, SAT AND SKIP DOSE ON MONDAYS, WEDNESDAYS, FRIDAYS, AND SUNDAYS    metoprolol tartrate (LOPRESSOR) 25 mg, oral, Every 12 hours    raNITIdine (Zantac) 150 mg tablet oral, 2 times daily PRN    traMADol (Ultram) 50 mg tablet TAKE 1 TABLET BY MOUTH TWO TIMES A DAY AS NEEDED FOR PAIN FOR UP TO 30 DAYS.    warfarin (Coumadin) 5 mg tablet Take 1 tablet (5 mg) by mouth every 7 days. On Thursday    warfarin (COUMADIN) 2.5 mg, oral, Daily, On Mon, Tue, Wed, Fri, Sat, & Sun.       Past Medical History:   As per Impression above.  No other significant past medical or surgical history.    Social History:   Positive tobacco  "use.  No alcohol or illicit drug use.    Family History:   No known family history of coronary artery disease.    Review of Systems:     A 14-system review is otherwise negative, other than noted.    ELECTROCARDIOGRAM:      8/31/24  Sinus rhythm with 1st degree AV block  Minimal voltage criteria for LVH, may be normal variant  Nonspecific ST abnormality  Abnormal ECG    PHYSICAL EXAMINATION:      Vital signs:  /66 (BP Location: Right arm, Patient Position: Lying)   Pulse 78   Temp 36.3 °C (97.3 °F) (Temporal)   Resp 18   Ht 1.575 m (5' 2.01\")   Wt 63 kg (139 lb)   SpO2 97%   BMI 25.42 kg/m²     General: No acute distress. Alert and oriented.  Head And Neck Examination: No jugular venous distention, no carotid bruits, no mass. Carotid upstrokes preserved. Oral mucosa moist.  No xanthelasma. Head and neck examination otherwise unremarkable.  Lungs: Clear to auscultation and percussion. No wheezes, no rales,  and no rhonchi.  Chest: Excursion appeared to be normal. No chest wall tenderness on palpation.  Heart: Normal S1 and S2. No S3. No S4. No rub. Grade 1/6 systolic murmur, best heard at the left sternal border. Point of maximal impulse was within normal limits.  Abdomen: Soft. Nontender. No organomegaly. No bruits. No masses. Obese.  Extremities: No bipedal edema. No clubbing. No cyanosis.  Pulses are strong throughout. No bruits.  Left leg dressing knee.  Musculoskeletal Exam: No ulcers, otherwise unremarkable.  Neuro: Neurologically appeared grossly intact.    CARDIAC TESTING:      None    LABORATORY DATA:      CBC:   Lab Results   Component Value Date    WBC 10.9 08/31/2024    RBC 3.23 (L) 08/31/2024    HGB 9.6 (L) 08/31/2024    HCT 28.1 (L) 08/31/2024     08/31/2024        CMP:    Lab Results   Component Value Date     (L) 08/31/2024    K 4.3 08/31/2024    CL 95 (L) 08/31/2024    CO2 23 08/31/2024    BUN 21 08/31/2024    CREATININE 0.85 08/31/2024    GLUCOSE 109 (H) 08/31/2024    " CALCIUM 8.7 08/31/2024       Magnesium:    Lab Results   Component Value Date    MG 1.40 (L) 08/31/2024     Hepatic Function Panel:    Lab Results   Component Value Date    ALKPHOS 71 08/29/2024    ALT 8 08/29/2024    AST 22 08/29/2024    PROT 6.8 08/29/2024    BILITOT 0.7 08/29/2024     TSH:    Lab Results   Component Value Date    TSH 1.51 08/30/2024     BNP:   Lab Results   Component Value Date     (H) 08/04/2024      PT/IR:    Lab Results   Component Value Date    PROTIME 35.3 (H) 08/31/2024    INR 3.1 (H) 08/31/2024       Cardiac Enzymes:    Lab Results   Component Value Date    TROPHS 674 (HH) 08/31/2024    TROPHS 6 08/04/2024       Diagnostic Studies:     XR knee left 4+ views    Result Date: 8/29/2024  Interpreted By:  Antonia Rucker, STUDY: XR KNEE LEFT 4+ VIEWS; ;  8/29/2024 3:03 am   INDICATION: Signs/Symptoms:pain, edema left knee.   COMPARISON: 08/04/2024, 11/10/2011   ACCESSION NUMBER(S): [id number]   ORDERING CLINICIAN: KEVIN GOLDEN   FINDINGS: Four views left knee. Chronic irregularity of the articular surface of the patella and lateral femoral condyle. Very large knee joint effusion, similar to prior. Alignment of the knee is within normal limits. Atherosclerotic vascular calcifications are noted.       No acute fracture or malalignment.   Chronic irregularity of the articular surface of the patella and lateral femoral condyle, likely related to old osteochondral injury and superimposed degenerative changes.   Grossly stable very large joint effusion.   MACRO: None   Signed by: Antonia Rucker 8/29/2024 3:40 AM Dictation workstation:   [id number]    CT angio lower extremity left w and or wo IV contrast    Result Date: 8/29/2024  Interpreted By:  Antonia Rucker, STUDY: CT ANGIO LOWER EXTREMITY LEFT W AND OR WO IV CONTRAST;  8/29/2024 2:57 am   INDICATION: Signs/Symptoms:left knee/leg edema, hx of hematoma.   COMPARISON: CT left knee 08/05/2024   ACCESSION NUMBER(S): [id number]   ORDERING  CLINICIAN: KEVIN GOLDEN   TECHNIQUE: Thin-section postcontrast axial images from above the aortic bifurcation through the left lower extremity. Sagittal and coronal reconstructions. Maximum intensity projection images were obtained at a separate workstation.   FINDINGS: Vascular: The aortic bifurcation is unremarkable. No significant stenosis of the imaged right common, internal and external iliac arteries. The right common femoral artery demonstrates mild calcification with no significant stenosis. The right profunda femoris is patent with no significant stenosis. Calcification with areas of mild stenosis of the distal right femoral and popliteal arteries.   Left lower extremity: Common iliac artery: Patent. No hemodynamically significant stenosis. Internal iliac artery: Patent. No hemodynamically significant stenosis. External iliac artery: Patent. No hemodynamically significant stenosis. Common femoral artery: Patent. No hemodynamically significant stenosis. Profunda femoris artery: Patent. No hemodynamically significant stenosis. Femoral artery: Patent. Mild distal stenosis. Popliteal artery: Calcifications with severe stenosis at the level of the femoral condyles. Tibioperoneal vessels: Limited evaluation of the tibioperoneal vessels due to dense calcification. The distal anterior tibial artery and dorsalis pedis artery are grossly patent. No definite opacification of the posterior tibial artery or plantar branches.   Grossly stable large complex, heterogeneous left knee joint effusion suggestive of hemarthrosis. No evidence of active hemorrhage. No arterial pseudoaneurysm seen. No acute osseous abnormality. Moderate left knee osteoarthrosis. Nonspecific subcutaneous edema from the mid left leg through the foot. Fatty atrophy of the left medial soleus. Right tibial intramedullary nail noted.   Imaged intrapelvic structures are remarkable for a pessary in the vagina. There is central hypodensity in the uterus  measuring 1.5 cm. Rectal anastomosis noted. Fat containing midline ventral hernia.       No evidence of arterial vascular injury of the left lower extremity.   Large left knee hemarthrosis, not significantly changed from 08/05/2024. No evidence of acute osseous abnormality.   Single vessel left lower extremity runoff, with patency of the dorsalis pedis artery. Severe stenosis of the left popliteal artery.   Nonspecific left lower extremity subcutaneous edema.   Focal hypodensity in the central uterus suspicious for endometrial thickening fluid. Evaluation follow-up nonemergent pelvic ultrasound is recommended.   MACRO: Critical Finding:  See findings. Notification was initiated on 8/29/2024 at 3:32 am by  Antonia Rucker.  (**-YCF-**) Instructions:   Signed by: Antonia Rucker 8/29/2024 3:33 AM Dictation workstation:   [id number]    Lower extremity venous duplex left    Result Date: 8/29/2024  Interpreted By:  Shannon Rascon, STUDY: [facility] US LOWER EXTREMITY VENOUS DUPLEX LEFT;  8/29/2024 2:39 am   INDICATION: Signs/Symptoms:leg edema, erythema, hx of clots.   COMPARISON: Venous Doppler ultrasound left lower extremity 06/21/2015.   ACCESSION NUMBER(S): [id number]   ORDERING CLINICIAN: KEVIN GOLDEN   TECHNIQUE: Vascular ultrasound of the  left lower extremity was performed. Real time compression views as well as Gray scale, color Doppler and spectral Doppler waveform analysis was performed.   FINDINGS: Evaluation of the visualized portions of the  left common femoral vein, proximal, mid, and distal femoral vein, and popliteal vein were performed.  Evaluation of the visualized portions of the  posterior tibial and peroneal veins were also performed.  In addition, evaluation of the contralateral common femoral vein was performed.   Limitations:  There is suboptimal visualization of the calf veins due to soft tissue edema and patient's body habitus.   The evaluated veins demonstrate normal compressibility. There is  intact venous flow demonstrating normal respiratory variability and normal augmentation of flow with calf compression. Therefore, there is no ultrasonographic evidence for deep vein thrombosis within the evaluated veins.       1.  No sonographic evidence for deep vein thrombosis within the evaluated veins of the left lower extremity from the inguinal region to the popliteal region. Suboptimal visualization of the left calf veins on this exam.     MACRO: None.   Signed by: Shannon Rascon 8/29/2024 3:09 AM Dictation workstation:   [id number]      Radiology:     XR knee left 4+ views   Final Result   No acute fracture or malalignment.        Chronic irregularity of the articular surface of the patella and   lateral femoral condyle, likely related to old osteochondral injury   and superimposed degenerative changes.        Grossly stable very large joint effusion.        MACRO:   None        Signed by: Antonia Rucker 8/29/2024 3:40 AM   Dictation workstation:   [id number]      CT angio lower extremity left w and or wo IV contrast   Final Result   No evidence of arterial vascular injury of the left lower extremity.        Large left knee hemarthrosis, not significantly changed from   08/05/2024. No evidence of acute osseous abnormality.        Single vessel left lower extremity runoff, with patency of the   dorsalis pedis artery. Severe stenosis of the left popliteal artery.        Nonspecific left lower extremity subcutaneous edema.        Focal hypodensity in the central uterus suspicious for endometrial   thickening fluid. Evaluation follow-up nonemergent pelvic ultrasound   is recommended.        MACRO:   Critical Finding:  See findings. Notification was initiated on   8/29/2024 at 3:32 am by  Antonia Rucker.  (**-YCF-**) Instructions:        Signed by: Antonia Rucker 8/29/2024 3:33 AM   Dictation workstation:   [id number]      Lower extremity venous duplex left   Final Result   1.  No sonographic evidence for  deep vein thrombosis within the   evaluated veins of the left lower extremity from the inguinal region   to the popliteal region. Suboptimal visualization of the left calf   veins on this exam.             MACRO:   None.        Signed by: Sahnnon Chestergina 8/29/2024 3:09 AM   Dictation workstation:   [id number]          Problem List:     Patient Active Problem List   Diagnosis    Hemarthrosis    Sepsis due to cellulitis (Multi)    Current use of long term anticoagulation    History of DVT (deep vein thrombosis)    Hyponatremia             Rico Moy MD, [facility] /  Cardiology      Of Note:  [employer] voice recognition dictation software was utilized partially in the preparation of this note, therefore, inaccuracies in spelling, word choice and punctuation may have occurred which were not recognized the time of signing.      Patient was seen and examined with total time of visit including chart preparation, rooming, and chart completion exceeding 40 minutes.    ----      Inpatient consult to Cardiology  Consult performed by: Rico Moy MD  Consult ordered by: Genny Singh MD

## 2024-08-31 NOTE — H&P (VIEW-ONLY)
CARDIOLOGY CONSULTATION NOTE       Patient:     Sommer Farmer    YOB: 1933  MRN:     [id number]    Date:     August 31, 2024    Consulting cardiologist:    Rico Moy MD , [facility]     Primary cardiologist: Dr. Billy, [facility] cardiology    Reason for Consultation: Chest pain, elevated troponins.    IMPRESSION:      Chest pain symptoms  Tachycardia  Non-ST elevation MI, elevated troponins  Atrial fibrillation, prior history with COVID, question recurrence  Left lower extremity cellulitis with sepsis  Left knee hemarthrosis  Elevated D-dimer  Hyponatremia  Hypertension  Anemia  History of prior deep venous thrombosis, 2021, negative venous duplex 8/20/2024  GERD  Varicose vein history  Breast cancer history of mastectomy 2006  COVID pneumonia January 2021  Degenerative joint disease  Prior ventral hernia repair  Prior appendectomy  Tobacco abuse history  Family history of coronary artery disease  Otherwise as per assessment below.    RECOMMENDATIONS:      Cardiovascular Supportive Care.  Telemetry Monitoring.  Serial Laboratories   Echocardiogram.  CTA chest, rule out pulmonary embolism.  Cardiac catheterization possibly this admission, versus as outpatient.  Further Recommendations to Follow.  See Orders.    HPI:     Electronic medical records were reviewed. Patient was interviewed and examined.    The patient's problems are listed in the impression above.       Sommer Farmer  was seen in cardiology consultation at the [facility] 8/31/2024.      Sommer Farmer is a 89 y.o. female presenting with left knee pain.  Patient states that she has had a hematoma in her left knee for the last 3 weeks.  She was evaluated and states that it was getting better.  Today she returns with worsening pain.  It has been limiting her ambulation.  Her left calf is also gotten more swollen and red.  She denies any drainage from the area.  Denies any fever or chills.     ER Course: BP  158/58, , RR 29, T36.9.  Labs notable for sodium 128, creatinine 1.13, BUN 25, INR 3.3, WBC 14.7.  X-ray of left knee showed grossly stable large joint effusion.  CT angio of left lower extremity was obtained which showed no evidence of arterial vascular injury and also revealed large left knee hemarthrosis not significantly changed.  Patient was given pain control and started on Ancef.    Patient denies Chest Pain, SOB, Lightheadedness, Dizziness, TIA or CVA symptoms.  No CHF or Edema.  No Palpitations.  No GI,  or Bleeding Issues. No Recent Fever or Chills.     Cardiovascular and general review of systems is otherwise negative.      Allergies:     Allergies   Allergen Reactions    Proton Pump Inhibitors Rash     protonix    gas symptoms    Tamoxifen Rash and Unknown        Medications:     Current Outpatient Medications   Medication Instructions    aspirin 81 mg chewable tablet 1 tablet, oral, Daily    cephalexin (KEFLEX) 500 mg, oral, 2 times daily    cyclobenzaprine (FLEXERIL) 10 mg, oral, Nightly PRN    diphenhydrAMINE-acetaminophen (Tylenol PM)  mg per tablet 1 tablet, oral, Nightly PRN    furosemide (LASIX) 20 mg, oral, Daily RT    lisinopril 20 mg, oral, Daily RT    methIMAzole (Tapazole) 5 mg tablet Take 0.5 tablets (2.5 mg) by mouth 3 times a week. TAKE 1/2 TABLET ON TUES, THURS, SAT AND SKIP DOSE ON MONDAYS, WEDNESDAYS, FRIDAYS, AND SUNDAYS    metoprolol tartrate (LOPRESSOR) 25 mg, oral, Every 12 hours    raNITIdine (Zantac) 150 mg tablet oral, 2 times daily PRN    traMADol (Ultram) 50 mg tablet TAKE 1 TABLET BY MOUTH TWO TIMES A DAY AS NEEDED FOR PAIN FOR UP TO 30 DAYS.    warfarin (Coumadin) 5 mg tablet Take 1 tablet (5 mg) by mouth every 7 days. On Thursday    warfarin (COUMADIN) 2.5 mg, oral, Daily, On Mon, Tue, Wed, Fri, Sat, & Sun.       Past Medical History:   As per Impression above.  No other significant past medical or surgical history.    Social History:   Positive tobacco  "use.  No alcohol or illicit drug use.    Family History:   No known family history of coronary artery disease.    Review of Systems:     A 14-system review is otherwise negative, other than noted.    ELECTROCARDIOGRAM:      8/31/24  Sinus rhythm with 1st degree AV block  Minimal voltage criteria for LVH, may be normal variant  Nonspecific ST abnormality  Abnormal ECG    PHYSICAL EXAMINATION:      Vital signs:  /66 (BP Location: Right arm, Patient Position: Lying)   Pulse 78   Temp 36.3 °C (97.3 °F) (Temporal)   Resp 18   Ht 1.575 m (5' 2.01\")   Wt 63 kg (139 lb)   SpO2 97%   BMI 25.42 kg/m²     General: No acute distress. Alert and oriented.  Head And Neck Examination: No jugular venous distention, no carotid bruits, no mass. Carotid upstrokes preserved. Oral mucosa moist.  No xanthelasma. Head and neck examination otherwise unremarkable.  Lungs: Clear to auscultation and percussion. No wheezes, no rales,  and no rhonchi.  Chest: Excursion appeared to be normal. No chest wall tenderness on palpation.  Heart: Normal S1 and S2. No S3. No S4. No rub. Grade 1/6 systolic murmur, best heard at the left sternal border. Point of maximal impulse was within normal limits.  Abdomen: Soft. Nontender. No organomegaly. No bruits. No masses. Obese.  Extremities: No bipedal edema. No clubbing. No cyanosis.  Pulses are strong throughout. No bruits.  Left leg dressing knee.  Musculoskeletal Exam: No ulcers, otherwise unremarkable.  Neuro: Neurologically appeared grossly intact.    CARDIAC TESTING:      None    LABORATORY DATA:      CBC:   Lab Results   Component Value Date    WBC 10.9 08/31/2024    RBC 3.23 (L) 08/31/2024    HGB 9.6 (L) 08/31/2024    HCT 28.1 (L) 08/31/2024     08/31/2024        CMP:    Lab Results   Component Value Date     (L) 08/31/2024    K 4.3 08/31/2024    CL 95 (L) 08/31/2024    CO2 23 08/31/2024    BUN 21 08/31/2024    CREATININE 0.85 08/31/2024    GLUCOSE 109 (H) 08/31/2024    " CALCIUM 8.7 08/31/2024       Magnesium:    Lab Results   Component Value Date    MG 1.40 (L) 08/31/2024     Hepatic Function Panel:    Lab Results   Component Value Date    ALKPHOS 71 08/29/2024    ALT 8 08/29/2024    AST 22 08/29/2024    PROT 6.8 08/29/2024    BILITOT 0.7 08/29/2024     TSH:    Lab Results   Component Value Date    TSH 1.51 08/30/2024     BNP:   Lab Results   Component Value Date     (H) 08/04/2024      PT/IR:    Lab Results   Component Value Date    PROTIME 35.3 (H) 08/31/2024    INR 3.1 (H) 08/31/2024       Cardiac Enzymes:    Lab Results   Component Value Date    TROPHS 674 (HH) 08/31/2024    TROPHS 6 08/04/2024       Diagnostic Studies:     XR knee left 4+ views    Result Date: 8/29/2024  Interpreted By:  Antonia Rucker, STUDY: XR KNEE LEFT 4+ VIEWS; ;  8/29/2024 3:03 am   INDICATION: Signs/Symptoms:pain, edema left knee.   COMPARISON: 08/04/2024, 11/10/2011   ACCESSION NUMBER(S): [id number]   ORDERING CLINICIAN: KEVIN GOLDEN   FINDINGS: Four views left knee. Chronic irregularity of the articular surface of the patella and lateral femoral condyle. Very large knee joint effusion, similar to prior. Alignment of the knee is within normal limits. Atherosclerotic vascular calcifications are noted.       No acute fracture or malalignment.   Chronic irregularity of the articular surface of the patella and lateral femoral condyle, likely related to old osteochondral injury and superimposed degenerative changes.   Grossly stable very large joint effusion.   MACRO: None   Signed by: Antonia Rucker 8/29/2024 3:40 AM Dictation workstation:   [id number]    CT angio lower extremity left w and or wo IV contrast    Result Date: 8/29/2024  Interpreted By:  Antonia Rucker, STUDY: CT ANGIO LOWER EXTREMITY LEFT W AND OR WO IV CONTRAST;  8/29/2024 2:57 am   INDICATION: Signs/Symptoms:left knee/leg edema, hx of hematoma.   COMPARISON: CT left knee 08/05/2024   ACCESSION NUMBER(S): [id number]   ORDERING  CLINICIAN: KEVIN GOLDEN   TECHNIQUE: Thin-section postcontrast axial images from above the aortic bifurcation through the left lower extremity. Sagittal and coronal reconstructions. Maximum intensity projection images were obtained at a separate workstation.   FINDINGS: Vascular: The aortic bifurcation is unremarkable. No significant stenosis of the imaged right common, internal and external iliac arteries. The right common femoral artery demonstrates mild calcification with no significant stenosis. The right profunda femoris is patent with no significant stenosis. Calcification with areas of mild stenosis of the distal right femoral and popliteal arteries.   Left lower extremity: Common iliac artery: Patent. No hemodynamically significant stenosis. Internal iliac artery: Patent. No hemodynamically significant stenosis. External iliac artery: Patent. No hemodynamically significant stenosis. Common femoral artery: Patent. No hemodynamically significant stenosis. Profunda femoris artery: Patent. No hemodynamically significant stenosis. Femoral artery: Patent. Mild distal stenosis. Popliteal artery: Calcifications with severe stenosis at the level of the femoral condyles. Tibioperoneal vessels: Limited evaluation of the tibioperoneal vessels due to dense calcification. The distal anterior tibial artery and dorsalis pedis artery are grossly patent. No definite opacification of the posterior tibial artery or plantar branches.   Grossly stable large complex, heterogeneous left knee joint effusion suggestive of hemarthrosis. No evidence of active hemorrhage. No arterial pseudoaneurysm seen. No acute osseous abnormality. Moderate left knee osteoarthrosis. Nonspecific subcutaneous edema from the mid left leg through the foot. Fatty atrophy of the left medial soleus. Right tibial intramedullary nail noted.   Imaged intrapelvic structures are remarkable for a pessary in the vagina. There is central hypodensity in the uterus  measuring 1.5 cm. Rectal anastomosis noted. Fat containing midline ventral hernia.       No evidence of arterial vascular injury of the left lower extremity.   Large left knee hemarthrosis, not significantly changed from 08/05/2024. No evidence of acute osseous abnormality.   Single vessel left lower extremity runoff, with patency of the dorsalis pedis artery. Severe stenosis of the left popliteal artery.   Nonspecific left lower extremity subcutaneous edema.   Focal hypodensity in the central uterus suspicious for endometrial thickening fluid. Evaluation follow-up nonemergent pelvic ultrasound is recommended.   MACRO: Critical Finding:  See findings. Notification was initiated on 8/29/2024 at 3:32 am by  Antonia Rucker.  (**-YCF-**) Instructions:   Signed by: Antonia Rucker 8/29/2024 3:33 AM Dictation workstation:   [id number]    Lower extremity venous duplex left    Result Date: 8/29/2024  Interpreted By:  Shannon Rascon, STUDY: [facility] US LOWER EXTREMITY VENOUS DUPLEX LEFT;  8/29/2024 2:39 am   INDICATION: Signs/Symptoms:leg edema, erythema, hx of clots.   COMPARISON: Venous Doppler ultrasound left lower extremity 06/21/2015.   ACCESSION NUMBER(S): [id number]   ORDERING CLINICIAN: KEVIN GOLDEN   TECHNIQUE: Vascular ultrasound of the  left lower extremity was performed. Real time compression views as well as Gray scale, color Doppler and spectral Doppler waveform analysis was performed.   FINDINGS: Evaluation of the visualized portions of the  left common femoral vein, proximal, mid, and distal femoral vein, and popliteal vein were performed.  Evaluation of the visualized portions of the  posterior tibial and peroneal veins were also performed.  In addition, evaluation of the contralateral common femoral vein was performed.   Limitations:  There is suboptimal visualization of the calf veins due to soft tissue edema and patient's body habitus.   The evaluated veins demonstrate normal compressibility. There is  intact venous flow demonstrating normal respiratory variability and normal augmentation of flow with calf compression. Therefore, there is no ultrasonographic evidence for deep vein thrombosis within the evaluated veins.       1.  No sonographic evidence for deep vein thrombosis within the evaluated veins of the left lower extremity from the inguinal region to the popliteal region. Suboptimal visualization of the left calf veins on this exam.     MACRO: None.   Signed by: Shannon Rascon 8/29/2024 3:09 AM Dictation workstation:   [id number]      Radiology:     XR knee left 4+ views   Final Result   No acute fracture or malalignment.        Chronic irregularity of the articular surface of the patella and   lateral femoral condyle, likely related to old osteochondral injury   and superimposed degenerative changes.        Grossly stable very large joint effusion.        MACRO:   None        Signed by: Antonia Rucker 8/29/2024 3:40 AM   Dictation workstation:   [id number]      CT angio lower extremity left w and or wo IV contrast   Final Result   No evidence of arterial vascular injury of the left lower extremity.        Large left knee hemarthrosis, not significantly changed from   08/05/2024. No evidence of acute osseous abnormality.        Single vessel left lower extremity runoff, with patency of the   dorsalis pedis artery. Severe stenosis of the left popliteal artery.        Nonspecific left lower extremity subcutaneous edema.        Focal hypodensity in the central uterus suspicious for endometrial   thickening fluid. Evaluation follow-up nonemergent pelvic ultrasound   is recommended.        MACRO:   Critical Finding:  See findings. Notification was initiated on   8/29/2024 at 3:32 am by  Antonia Rucker.  (**-YCF-**) Instructions:        Signed by: Antonia Rucker 8/29/2024 3:33 AM   Dictation workstation:   [id number]      Lower extremity venous duplex left   Final Result   1.  No sonographic evidence for  deep vein thrombosis within the   evaluated veins of the left lower extremity from the inguinal region   to the popliteal region. Suboptimal visualization of the left calf   veins on this exam.             MACRO:   None.        Signed by: Shannon Chestergina 8/29/2024 3:09 AM   Dictation workstation:   [id number]          Problem List:     Patient Active Problem List   Diagnosis    Hemarthrosis    Sepsis due to cellulitis (Multi)    Current use of long term anticoagulation    History of DVT (deep vein thrombosis)    Hyponatremia             Rico Moy MD, [facility] /  Cardiology      Of Note:  [email or web address] voice recognition dictation software was utilized partially in the preparation of this note, therefore, inaccuracies in spelling, word choice and punctuation may have occurred which were not recognized the time of signing.      Patient was seen and examined with total time of visit including chart preparation, rooming, and chart completion exceeding 40 minutes.    ----      Inpatient consult to Cardiology  Consult performed by: Rico Moy MD  Consult ordered by: Genny Singh MD

## 2024-08-31 NOTE — CONSULTS
Inpatient consult to Gastroenterology  Consult performed by: Cody Sapp MD  Consult ordered by: Genny Singh MD  Reason for consult: odynophagia          Reason For Consult  odynophagia    History Of Present Illness  Sommer Farmer is a 89 y.o. female presenting with left knee pain, found to have hemarthrosis of the knee and had some leukocytosis and cellulitis.  Also on Coumadin for DVT  GI consult for odynophagia.  According to patient she has been having worsening reflux for the past few days and according to her daughters the swallowing problems only recent.  It is very difficult to get a good history but says that only 2 days she has been having some odynophagia.  Prior to that she is not have any problems with dysphagia or odynophagia.       Past Medical History  She has no past medical history on file.    Surgical History  She has a past surgical history that includes CT angio abdomen pelvis w and or wo IV IV contrast (9/8/2019).     Social History  She reports that she has never smoked. She has never used smokeless tobacco. She reports that she does not drink alcohol and does not use drugs.    Family History  No family history on file.     Allergies  Proton pump inhibitors and Tamoxifen    Review of Systems   Constitutional: Negative.    HENT: Negative.     Eyes: Negative.    Respiratory: Negative.     Cardiovascular: Negative.    Gastrointestinal:         As in HPI    Endocrine: Negative.    Genitourinary: Negative.    Musculoskeletal: Negative.    Skin: Negative.    Neurological: Negative.    Psychiatric/Behavioral: Negative.          Physical Exam  Vitals reviewed.   Constitutional:       Appearance: Normal appearance.   HENT:      Head: Normocephalic.   Eyes:      Conjunctiva/sclera: Conjunctivae normal.      Pupils: Pupils are equal, round, and reactive to light.   Cardiovascular:      Rate and Rhythm: Normal rate and regular rhythm.   Pulmonary:      Effort: Pulmonary effort is  "normal.      Breath sounds: Normal breath sounds.   Abdominal:      General: Abdomen is flat. Bowel sounds are normal. There is no distension.      Palpations: Abdomen is soft.      Tenderness: There is no abdominal tenderness. There is no guarding or rebound.   Musculoskeletal:         General: Swelling present. Normal range of motion.      Comments: Left knee with erythema, warmth, and swelling.,   Skin:     General: Skin is warm and dry.   Neurological:      General: No focal deficit present.      Mental Status: She is alert and oriented to person, place, and time.          Last Recorded Vitals  Blood pressure 144/66, pulse 78, temperature 36.3 °C (97.3 °F), resp. rate 20, height 1.575 m (5' 2.01\"), weight 63 kg (139 lb), SpO2 97%.    Assessment/Plan   This is a 89-year-old woman with a history of DVT on Coumadin who presents to the hospital with left knee pain and swelling and concern for cellulitis.  GI is consulted for concern for odynophagia.  She does have some dysphagia and odynophagia however I am not sure about the clinical presentation because it seems to be very acute to me.    She does have some reflux and would eventually require an endoscopy.  The family was asking if this could be done as an inpatient however she currently has a very high troponin which is unclear if it is type II MI and her INR is 3.1 so would need to make sure she is cleared by cardiology and stop the Coumadin prior to her EGD.  Will start with an esophagram to make sure there is no malignancy and then decide on EGD afterwards.    According to the patient and her daughter she thinks she may have had developed a rash in the past with PPIs so I agree with famotidine for now.          "

## 2024-08-31 NOTE — PROGRESS NOTES
"PROGRESS NOTE    ASSESSMENT AND PLAN:     #.  Sepsis secondary to left lower extremity cellulitis  -Presented with leukocytosis and tachycardia, diffuse erythema of left lower extremity without drainage  -She continues to have persistent leukocytosis, tachycardic and severe pain  -Seen by infectious disease, Ancef increased to 2 g IV.  -Cellulitis, overall improving     #.  Left knee pain 2/2 hemarthrosis of left knee  -Likely induced by trauma while on Coumadin, Doppler negative for DVT  -CT scan shows a large left knee hemarthrosis not significantly changed from 8/5  -Orthopedic surgery consult, attempted an aspiration unable to aspirate.    #. Troponin elevation  #. Chest pain  -ECG shows no acute ischemic changes  -Etiology is most likely related to reflux  -She continues to have belching and chest pain with eating.  -Troponin was elevated at 667, continue to trend  -Patient is allergic to Prilosec, family believes that the allergic reaction is hives  -Continue Pepcid, Tums as needed  -Cardiology consulted for troponin elevation  -GI consulted for odynophagia    #.  Hyponatremia  -Likely hypotonic  -Serum sodium continues to drop, consult nephrology     #.  History of DVT in 2021  #.  Long-term anticoagulation  -Doppler negative for DVT  -Coumadin held due to supratherapeutic INR  -It appears that her DVT was provoked in 2021 when she had COVID-19.  Family will check with her doctor to see if she has any hypercoagulable disease.  If not patient can be discontinued off anticoagulation given that this is her first provoked DVT, and she has a history of multiple falls.            SUBJECTIVE:   Admit Date: 8/29/2024    Interval History: Complains of pain in her chest with oral intake.      OBJECTIVE:   Vitals: /66 (BP Location: Right arm, Patient Position: Lying)   Pulse 78   Temp 36.3 °C (97.3 °F) (Temporal)   Resp 18   Ht 1.575 m (5' 2.01\")   Wt 63 kg (139 lb)   SpO2 97%   BMI 25.42 kg/m²    Wt " "Readings from Last 3 Encounters:   08/29/24 63 kg (139 lb)   08/04/24 64.9 kg (143 lb)      24HR INTAKE/OUTPUT:    Intake/Output Summary (Last 24 hours) at 8/31/2024 1148  Last data filed at 8/31/2024 1002  Gross per 24 hour   Intake 370 ml   Output 800 ml   Net -430 ml       PHYSICAL EXAM:   GENERAL: Laying in bed, does not appear to be in any distress.   HEENT: HEAD: Normocephalic atraumatic.  Neck: Supple.  Eyes: Pupils are reactive to direct light.   CVS: S1, S2 heard. Regular rate and rhythm  LUNGS: Clear to auscultate bilaterally. No wheezing or rhonchi appreciated.  ABDOMEN: Soft, nontender to palpate. Positive bowel sounds. No guarding or rebound appreciated.  NEUROLOGICAL: No focal neurological deficits appreciated. Cranial nerves are grossly intact.  EXTREMITIES: Left knee edema, tenderness to palpate. Ecchymosis appreciated over left inner thigh. Erythema appreciated over left lower extremity.   SKIN:  Grossly intact, warm and dry.      LABS/IMAGING AND MEDICATIONS:   Scheduled Meds:aspirin, 81 mg, oral, Daily  calcium carbonate, 500 mg, oral, Daily  ceFAZolin, 1 g, intravenous, q8h  famotidine, 20 mg, oral, Daily  lactobacillus acidophilus, 1 tablet, oral, BID  lidocaine, 1 patch, transdermal, Daily  lisinopril, 20 mg, oral, Daily  magnesium sulfate, 2 g, intravenous, Once  metoprolol tartrate, 25 mg, oral, q12h  polyethylene glycol, 17 g, oral, Daily  sennosides-docusate sodium, 1 tablet, oral, Nightly      PRN Meds:PRN medications: acetaminophen **OR** acetaminophen **OR** acetaminophen, benzonatate, calcium carbonate, guaiFENesin, HYDROmorphone, melatonin, ondansetron **OR** ondansetron, oxyCODONE, oxyCODONE    No lab exists for component: \"CBC\"   No lab exists for component: \"CMP\"   No lab exists for component: \"TROPONIN\"      Results from last 7 days   Lab Units 08/31/24  0922   INR  3.1*     No lab exists for component: \"LIPIDS\"       No lab exists for component: \"URINALYSIS\"          BMP:  Results " "from last 7 days   Lab Units 08/31/24  0401 08/30/24  0431 08/29/24  1208   SODIUM mmol/L 123* 126* 129*   POTASSIUM mmol/L 4.3 4.9 4.0   CHLORIDE mmol/L 95* 94* 96*   CO2 mmol/L 23 25 22   BUN mg/dL 21 20 20   CREATININE mg/dL 0.85 0.93 1.02       CBC:  Results from last 7 days   Lab Units 08/31/24  0401 08/30/24  0431 08/29/24  0108   WBC AUTO x10*3/uL 10.9 14.0* 14.7*   RBC AUTO x10*6/uL 3.23* 3.81* 3.52*   HEMOGLOBIN g/dL 9.6* 11.4* 10.5*   HEMATOCRIT % 28.1* 33.8* 31.0*   MCV fL 87 89 88   MCH pg 29.7 29.9 29.8   MCHC g/dL 34.2 33.7 33.9   RDW % 12.5 12.7 12.8   PLATELETS AUTO x10*3/uL 345 396 393       Cardiac Enzymes:   Results from last 7 days   Lab Units 08/31/24  0922   TROPHS ng/L 674*   CK TOTAL U/L 54         Hepatic Function Panel:  Results from last 7 days   Lab Units 08/29/24  0108   ALK PHOS U/L 71   ALT U/L 8   AST U/L 22   PROTEIN TOTAL g/dL 6.8   BILIRUBIN TOTAL mg/dL 0.7       Magnesium:  Results from last 7 days   Lab Units 08/31/24  0401   MAGNESIUM mg/dL 1.40*       Pro-BNP:  No results found for: \"PROBNP\"    INR:  Results from last 7 days   Lab Units 08/31/24  0922 08/30/24  1252 08/30/24  0431   PROTIME seconds 35.3* 48.4* 45.7*   INR  3.1* 4.2* 4.0*       TSH:  Lab Results   Component Value Date    TSH 1.51 08/30/2024       Lipid Profile:        No lab exists for component: \"LABVLDL\"    HgbA1C:        Lactate Level:  No lab exists for component: \"LACTA\"    CMP:  Results from last 7 days   Lab Units 08/31/24  0401 08/30/24  0431 08/29/24  1208 08/29/24  0108   SODIUM mmol/L 123* 126* 129* 128*   POTASSIUM mmol/L 4.3 4.9 4.0 4.1   CHLORIDE mmol/L 95* 94* 96* 96*   CO2 mmol/L 23 25 22 22   BUN mg/dL 21 20 20 25*   CREATININE mg/dL 0.85 0.93 1.02 1.13*   GLUCOSE mg/dL 109* 123* 110* 112*   CALCIUM mg/dL 8.7 9.4 9.7 9.3   PROTEIN TOTAL g/dL  --   --   --  6.8   BILIRUBIN TOTAL mg/dL  --   --   --  0.7   ALK PHOS U/L  --   --   --  71   AST U/L  --   --   --  22   ALT U/L  --   --   --  8 " "      Amylase:        Lipase:        ABG:        No lab exists for component: \"PO2\", \"PCO2\", \"HCO3\", \"BE\", \"O2SAT\"       "

## 2024-09-01 VITALS
TEMPERATURE: 97.7 F | OXYGEN SATURATION: 97 % | HEART RATE: 76 BPM | DIASTOLIC BLOOD PRESSURE: 61 MMHG | WEIGHT: 139 LBS | BODY MASS INDEX: 25.58 KG/M2 | SYSTOLIC BLOOD PRESSURE: 132 MMHG | RESPIRATION RATE: 20 BRPM | HEIGHT: 62 IN

## 2024-09-01 LAB
ANION GAP SERPL CALC-SCNC: 10 MMOL/L (ref 10–20)
ANION GAP SERPL CALC-SCNC: 13 MMOL/L (ref 10–20)
BACTERIA BLD CULT: NORMAL
BACTERIA BLD CULT: NORMAL
BUN SERPL-MCNC: 17 MG/DL (ref 6–23)
BUN SERPL-MCNC: 19 MG/DL (ref 6–23)
CALCIUM SERPL-MCNC: 8.6 MG/DL (ref 8.6–10.3)
CALCIUM SERPL-MCNC: 8.8 MG/DL (ref 8.6–10.3)
CARDIAC TROPONIN I PNL SERPL HS: 602 NG/L (ref 0–13)
CHLORIDE SERPL-SCNC: 95 MMOL/L (ref 98–107)
CHLORIDE SERPL-SCNC: 96 MMOL/L (ref 98–107)
CO2 SERPL-SCNC: 20 MMOL/L (ref 21–32)
CO2 SERPL-SCNC: 24 MMOL/L (ref 21–32)
CREAT SERPL-MCNC: 0.8 MG/DL (ref 0.5–1.05)
CREAT SERPL-MCNC: 0.97 MG/DL (ref 0.5–1.05)
EGFRCR SERPLBLD CKD-EPI 2021: 55 ML/MIN/1.73M*2
EGFRCR SERPLBLD CKD-EPI 2021: 70 ML/MIN/1.73M*2
ERYTHROCYTE [DISTWIDTH] IN BLOOD BY AUTOMATED COUNT: 12.6 % (ref 11.5–14.5)
GLUCOSE SERPL-MCNC: 120 MG/DL (ref 74–99)
GLUCOSE SERPL-MCNC: 94 MG/DL (ref 74–99)
HCT VFR BLD AUTO: 27.2 % (ref 36–46)
HGB BLD-MCNC: 9.1 G/DL (ref 12–16)
HOLD SPECIMEN: NORMAL
INR PPP: 1.6 (ref 0.9–1.1)
INR PPP: 1.7 (ref 0.9–1.1)
MAGNESIUM SERPL-MCNC: 1.72 MG/DL (ref 1.6–2.4)
MCH RBC QN AUTO: 29.3 PG (ref 26–34)
MCHC RBC AUTO-ENTMCNC: 33.5 G/DL (ref 32–36)
MCV RBC AUTO: 88 FL (ref 80–100)
NRBC BLD-RTO: 0 /100 WBCS (ref 0–0)
OSMOLALITY SERPL: 265 MOSM/KG (ref 280–300)
PLATELET # BLD AUTO: 340 X10*3/UL (ref 150–450)
POTASSIUM SERPL-SCNC: 4.2 MMOL/L (ref 3.5–5.3)
POTASSIUM SERPL-SCNC: 4.4 MMOL/L (ref 3.5–5.3)
PROTHROMBIN TIME: 18.2 SECONDS (ref 9.8–12.8)
PROTHROMBIN TIME: 19.6 SECONDS (ref 9.8–12.8)
RBC # BLD AUTO: 3.11 X10*6/UL (ref 4–5.2)
SODIUM SERPL-SCNC: 125 MMOL/L (ref 136–145)
SODIUM SERPL-SCNC: 125 MMOL/L (ref 136–145)
WBC # BLD AUTO: 8.9 X10*3/UL (ref 4.4–11.3)

## 2024-09-01 PROCEDURE — 2500000001 HC RX 250 WO HCPCS SELF ADMINISTERED DRUGS (ALT 637 FOR MEDICARE OP): Performed by: HOSPITALIST

## 2024-09-01 PROCEDURE — 85027 COMPLETE CBC AUTOMATED: CPT | Performed by: STUDENT IN AN ORGANIZED HEALTH CARE EDUCATION/TRAINING PROGRAM

## 2024-09-01 PROCEDURE — 82533 TOTAL CORTISOL: CPT | Mod: ELYLAB | Performed by: INTERNAL MEDICINE

## 2024-09-01 PROCEDURE — 80048 BASIC METABOLIC PNL TOTAL CA: CPT | Performed by: INTERNAL MEDICINE

## 2024-09-01 PROCEDURE — 99232 SBSQ HOSP IP/OBS MODERATE 35: CPT | Performed by: HOSPITALIST

## 2024-09-01 PROCEDURE — 1200000002 HC GENERAL ROOM WITH TELEMETRY DAILY

## 2024-09-01 PROCEDURE — 2500000004 HC RX 250 GENERAL PHARMACY W/ HCPCS (ALT 636 FOR OP/ED): Mod: JZ | Performed by: INTERNAL MEDICINE

## 2024-09-01 PROCEDURE — 36415 COLL VENOUS BLD VENIPUNCTURE: CPT | Performed by: HOSPITALIST

## 2024-09-01 PROCEDURE — 2500000001 HC RX 250 WO HCPCS SELF ADMINISTERED DRUGS (ALT 637 FOR MEDICARE OP): Performed by: NURSE PRACTITIONER

## 2024-09-01 PROCEDURE — 84484 ASSAY OF TROPONIN QUANT: CPT | Performed by: HOSPITALIST

## 2024-09-01 PROCEDURE — 83930 ASSAY OF BLOOD OSMOLALITY: CPT | Mod: ELYLAB | Performed by: INTERNAL MEDICINE

## 2024-09-01 PROCEDURE — 36415 COLL VENOUS BLD VENIPUNCTURE: CPT | Performed by: STUDENT IN AN ORGANIZED HEALTH CARE EDUCATION/TRAINING PROGRAM

## 2024-09-01 PROCEDURE — 2500000001 HC RX 250 WO HCPCS SELF ADMINISTERED DRUGS (ALT 637 FOR MEDICARE OP): Performed by: STUDENT IN AN ORGANIZED HEALTH CARE EDUCATION/TRAINING PROGRAM

## 2024-09-01 PROCEDURE — 85610 PROTHROMBIN TIME: CPT | Performed by: INTERNAL MEDICINE

## 2024-09-01 PROCEDURE — 2500000004 HC RX 250 GENERAL PHARMACY W/ HCPCS (ALT 636 FOR OP/ED): Performed by: STUDENT IN AN ORGANIZED HEALTH CARE EDUCATION/TRAINING PROGRAM

## 2024-09-01 PROCEDURE — 85610 PROTHROMBIN TIME: CPT | Performed by: HOSPITALIST

## 2024-09-01 PROCEDURE — 83735 ASSAY OF MAGNESIUM: CPT | Performed by: HOSPITALIST

## 2024-09-01 PROCEDURE — 2500000005 HC RX 250 GENERAL PHARMACY W/O HCPCS: Performed by: STUDENT IN AN ORGANIZED HEALTH CARE EDUCATION/TRAINING PROGRAM

## 2024-09-01 PROCEDURE — 80048 BASIC METABOLIC PNL TOTAL CA: CPT | Performed by: STUDENT IN AN ORGANIZED HEALTH CARE EDUCATION/TRAINING PROGRAM

## 2024-09-01 PROCEDURE — 99233 SBSQ HOSP IP/OBS HIGH 50: CPT | Performed by: INTERNAL MEDICINE

## 2024-09-01 PROCEDURE — 2500000002 HC RX 250 W HCPCS SELF ADMINISTERED DRUGS (ALT 637 FOR MEDICARE OP, ALT 636 FOR OP/ED): Performed by: INTERNAL MEDICINE

## 2024-09-01 RX ORDER — TOLVAPTAN 15 MG/1
15 TABLET ORAL DAILY
Status: COMPLETED | OUTPATIENT
Start: 2024-09-01 | End: 2024-09-02

## 2024-09-01 ASSESSMENT — COGNITIVE AND FUNCTIONAL STATUS - GENERAL
PERSONAL GROOMING: A LITTLE
TOILETING: A LITTLE
DRESSING REGULAR UPPER BODY CLOTHING: A LITTLE
EATING MEALS: A LITTLE
TURNING FROM BACK TO SIDE WHILE IN FLAT BAD: A LITTLE
CLIMB 3 TO 5 STEPS WITH RAILING: TOTAL
MOBILITY SCORE: 16
MOBILITY SCORE: 16
PERSONAL GROOMING: A LITTLE
HELP NEEDED FOR BATHING: A LITTLE
DAILY ACTIVITIY SCORE: 18
WALKING IN HOSPITAL ROOM: A LOT
CLIMB 3 TO 5 STEPS WITH RAILING: TOTAL
TOILETING: A LITTLE
DRESSING REGULAR UPPER BODY CLOTHING: A LITTLE
DRESSING REGULAR LOWER BODY CLOTHING: A LITTLE
EATING MEALS: A LITTLE
HELP NEEDED FOR BATHING: A LITTLE
DRESSING REGULAR LOWER BODY CLOTHING: A LITTLE
TURNING FROM BACK TO SIDE WHILE IN FLAT BAD: A LITTLE
STANDING UP FROM CHAIR USING ARMS: A LITTLE
WALKING IN HOSPITAL ROOM: A LOT
MOVING TO AND FROM BED TO CHAIR: A LITTLE
DAILY ACTIVITIY SCORE: 18
STANDING UP FROM CHAIR USING ARMS: A LITTLE
MOVING TO AND FROM BED TO CHAIR: A LITTLE

## 2024-09-01 ASSESSMENT — PAIN SCALES - GENERAL
PAINLEVEL_OUTOF10: 5 - MODERATE PAIN
PAINLEVEL_OUTOF10: 8
PAINLEVEL_OUTOF10: 0 - NO PAIN
PAINLEVEL_OUTOF10: 6

## 2024-09-01 ASSESSMENT — PAIN - FUNCTIONAL ASSESSMENT
PAIN_FUNCTIONAL_ASSESSMENT: 0-10

## 2024-09-01 ASSESSMENT — ENCOUNTER SYMPTOMS
EYE DISCHARGE: 0
ARTHRALGIAS: 0
DIZZINESS: 0
ACTIVITY CHANGE: 0
AGITATION: 0
ABDOMINAL DISTENTION: 0
ADENOPATHY: 0
DIFFICULTY URINATING: 0
APNEA: 0

## 2024-09-01 NOTE — CONSULTS
[facility]  Consult Note           Reason for Consult:  hyponatremia  Requesting Physician:  Dr. Jasso    Chief Complaint:  redness of left leg  History Obtained From:  patient, electronic medical record    History of Present Ilness:    89 y.o. year old female admitted with cellulitis and left knee pain.  Has been dealing with low sodium levels.  Has seen [facility] neph before for hyponatremia and [facility].  Taken off hydrochlorothiazide in 11/2023.  Was placed on lasix in July.  After this, developed high k and low na down to 123.  Improved to 131 but back down to 123 here.  She has lasix on her med list but says that hasn't been taking that since July.  Drinks normal amounts.  Denies excess amounts of fluid intake.  Echo in March with EF 65%.  Mild aortic stenosis.      Past Medical History:    History reviewed. No pertinent past medical history.     Past Surgical History:    Past Surgical History:   Procedure Laterality Date    CT ABDOMEN PELVIS ANGIOGRAM W AND/OR WO IV CONTRAST  9/8/2019    CT ABDOMEN PELVIS ANGIOGRAM W AND/OR WO IV [facility]        Home Medications:    No current facility-administered medications on file prior to encounter.     Current Outpatient Medications on File Prior to Encounter   Medication Sig Dispense Refill    aspirin 81 mg chewable tablet Chew 1 tablet (81 mg) once daily.      cephalexin (Keflex) 500 mg capsule Take 1 capsule (500 mg) by mouth 2 times a day.      cyclobenzaprine (Flexeril) 10 mg tablet Take 1 tablet (10 mg) by mouth as needed at bedtime for muscle spasms.      furosemide (Lasix) 20 mg tablet Take 1 tablet (20 mg) by mouth once daily.      lisinopril 20 mg tablet Take 1 tablet (20 mg) by mouth once daily.      methIMAzole (Tapazole) 5 mg tablet Take 0.5 tablets (2.5 mg) by mouth 3 times a week. TAKE 1/2 TABLET ON TUES, THURS, SAT AND SKIP DOSE ON MONDAYS, WEDNESDAYS, FRIDAYS, AND SUNDAYS      metoprolol tartrate (Lopressor) 25 mg  tablet Take 1 tablet (25 mg) by mouth every 12 hours.      raNITIdine (Zantac) 150 mg tablet Take by mouth 2 times a day as needed.      traMADol (Ultram) 50 mg tablet TAKE 1 TABLET BY MOUTH TWO TIMES A DAY AS NEEDED FOR PAIN FOR UP TO 30 DAYS.      warfarin (Coumadin) 2.5 mg tablet Take 1 tablet (2.5 mg) by mouth once daily. On Mon, Tue, Wed, Fri, Sat, & Sun.      diphenhydrAMINE-acetaminophen (Tylenol PM)  mg per tablet Take 1 tablet by mouth as needed at bedtime for sleep.      warfarin (Coumadin) 5 mg tablet Take 1 tablet (5 mg) by mouth every 7 days. On Thursday      [DISCONTINUED] lisinopriL-hydrochlorothiazide 10-12.5 mg tablet Take 1 tablet by mouth once daily.      [DISCONTINUED] lisinopriL-hydrochlorothiazide 10-12.5 mg tablet Take 1.5 tablets by mouth once daily.      [DISCONTINUED] meclizine (Antivert) 25 mg tablet Take by mouth 3 times a day as needed.      [DISCONTINUED] ondansetron 4 mg film Take by mouth 3 times a day as needed.      [DISCONTINUED] traMADol (Ultram) 50 mg tablet Take 1 tablet (50 mg) by mouth every 12 hours if needed.      [DISCONTINUED] triamcinolone (Kenalog) 0.1 % ointment Apply topically.         Allergies:  Proton pump inhibitors and Tamoxifen    Social History:    Social History     Socioeconomic History    Marital status:      Spouse name: Not on file    Number of children: Not on file    Years of education: Not on file    Highest education level: Not on file   Occupational History    Not on file   Tobacco Use    Smoking status: Never    Smokeless tobacco: Never   Substance and Sexual Activity    Alcohol use: Never    Drug use: Never    Sexual activity: Not on file   Other Topics Concern    Not on file   Social History Narrative    Not on file     Social Determinants of Health     Financial Resource Strain: Low Risk  (8/29/2024)    Overall Financial Resource Strain (CARDIA)     Difficulty of Paying Living Expenses: Not hard at all   Food Insecurity: Patient  Declined (7/19/2024)    Received from [facility]    Hunger Vital Sign     Worried About Running Out of Food in the Last Year: Patient declined     Ran Out of Food in the Last Year: Patient declined   Transportation Needs: No Transportation Needs (8/29/2024)    PRAPARE - Transportation     Lack of Transportation (Medical): No     Lack of Transportation (Non-Medical): No   Physical Activity: Patient Declined (7/19/2024)    Received from [facility]    Exercise Vital Sign     Days of Exercise per Week: Patient declined     Minutes of Exercise per Session: Patient declined   Stress: Patient Declined (1/19/2024)    Received from [facility]    [employer] - Occupational Stress Questionnaire     Feeling of Stress : Patient declined   Social Connections: Unknown (7/19/2024)    Received from [facility]    Social Connection and Isolation Panel [NHANES]     Frequency of Communication with Friends and Family: More than three times a week     Frequency of Social Gatherings with Friends and Family: Patient declined     Attends Protestant Services: Patient declined     Active Member of Clubs or Organizations: Yes     Attends Club or Organization Meetings: More than 4 times per year     Marital Status: Patient declined   Intimate Partner Violence: Not on file   Housing Stability: Unknown (8/30/2024)    Housing Stability Vital Sign     Unable to Pay for Housing in the Last Year: No     Number of Times Moved in the Last Year: 1     Homeless in the Last Year: Patient declined       Family History:   No family history on file.    Review of Systems:   Review of Systems   Constitutional:  Negative for activity change.   HENT:  Negative for congestion.    Eyes:  Negative for discharge.   Respiratory:  Negative for apnea.    Cardiovascular:  Positive for leg swelling.   Gastrointestinal:  Negative for abdominal distention.   Endocrine: Negative for cold intolerance.   Genitourinary:   "Negative for difficulty urinating.   Musculoskeletal:  Negative for arthralgias.   Allergic/Immunologic: Negative for environmental allergies.   Neurological:  Negative for dizziness.   Hematological:  Negative for adenopathy.   Psychiatric/Behavioral:  Negative for agitation.          Physical exam:   Constitutional:  VITALS:  /63 (BP Location: Right arm, Patient Position: Lying)   Pulse 94   Temp 36.8 °C (98.2 °F) (Temporal)   Resp 20   Ht 1.575 m (5' 2.01\")   Wt 63 kg (139 lb)   SpO2 95%   BMI 25.42 kg/m²      Wt Readings from Last 3 Encounters:   08/29/24 63 kg (139 lb)   08/04/24 64.9 kg (143 lb)      Gen: alert, awake, nad  Head: atraumatic, normocephalic  Skin: no rash, turgor wnl  Heent:  eomi, mmm  Neck: no bruits or jvd noted, thyroid normal  Lungs:  clear to auscultation  Heart: 3/6 JENNIFER  Abdomen:  +bs, soft, nt, nd, no hepatosplenomegaly   Extremities: 1+ edema  Psychiatric: mood and affect appropriate; judgement and insight intact  Musculoskeletal:  Rom, muscular strength intact; digits, nails normal    Data/  CBC:   Lab Results   Component Value Date    WBC 8.9 09/01/2024    RBC 3.11 (L) 09/01/2024    HGB 9.1 (L) 09/01/2024    HCT 27.2 (L) 09/01/2024    MCV 88 09/01/2024    MCH 29.3 09/01/2024    MCHC 33.5 09/01/2024    RDW 12.6 09/01/2024     09/01/2024     BMP:    Lab Results   Component Value Date     (L) 09/01/2024    K 4.2 09/01/2024    CL 95 (L) 09/01/2024    CO2 24 09/01/2024    BUN 17 09/01/2024    CREATININE 0.80 09/01/2024    CALCIUM 8.6 09/01/2024    GLUCOSE 94 09/01/2024     CT angio chest for pulmonary embolism  Narrative: Interpreted By:  Ru Owen,   STUDY:  CT ANGIO CHEST FOR PULMONARY EMBOLISM;  8/31/2024 2:35 pm      INDICATION:  Signs/Symptoms:Tachycardia, chest pain, troponin elevation.          COMPARISON:  CTA abdomen and pelvis 8 September 2019; frontal chest 9 October 2018      ACCESSION NUMBER(S):  [id number]      ORDERING CLINICIAN:  RICARDA" KASHIF      TECHNIQUE:  Pulmonary arterial phase CT chest after the uneventful administration  of 75 mL IV contrast (Omnipaque 350).      Three dimensional maximum intensity projection (3-D MIPs) image/s  were created on a separate dedicated workstation, reviewed and saved      FINDINGS:  CARDIOVASCULAR:      Acute pulmonary embolism: Negative through the  segmental (third  order) branch level. Subsegmental and more distal branches not well  enough opacified to evaluate. Acute right heart strain:  Negative. No  CT evidence of acute right heart strain Cardiac thrombus:  Negative;  no obvious right heart or other cardiac thrombus is seen Pulmonary  arteries ectasia:  Negative      Heart size:  Borderline  Pericardial effusion:  Negative      Thoracic aortic aneurysm:  Negative  Aortic dissection:  Negative      Heart failure change:  Negative.  No sign of interstitial or alveolar  edema. Other:  n/a      NONVASCULAR MEDIASTINUM:  Esophagus:  Grossly normal by CT  Mediastinal Mass:  Negative  Hiatal hernia:  Unchanged short/small probably sliding-type  Other:  n/a      LYMPH NODES:  No thoracic adenopathy      LUNGS / AIRSPACES / AIRWAYS:      LARGE AIRWAYS  Filling defect: Negative  Wall thickening: Negative  Bronchiectasis: Negative  Other: N/A      AIRSPACES  Fibrosis: Negative  Emphysema: Negative  Consolidation: Negative  Ground glass airspace disease: Negative  Edema: Negative  Nodule / Mass: Negative  Other: One or two segments of left lower lobe collapse. Milder  atelectasis in the right lower lobe. Motion artifact      PLEURA:  Effusion:  Left-greater-than-right small bilateral free-flowing  Pneumothorax:  Both sides negative  Other:  n/a      CHEST WALL:  Soft tissues of the chest wall are unremarkable      SKELETON:  No acute or contributory abnormality      UPPER ABDOMEN:  Vicariously excreted contrast from recent CTA is in the lumen, not an  acute pathologic process. High attenuation exophytic left  "renal  lesion is indeterminate but unchanged in size      Impression: LEFT-GREATER-THAN-RIGHT SMALL FREE-FLOWING BILATERAL PLEURAL EFFUSIONS      ONE OR TWO SEGMENTS OF LEFT LOWER LOBE COLLAPSE      NO OTHER ACUTE DISEASE IN THE CHEST          NO ACUTE PULMONARY EMBOLISM THROUGH THE SEGMENTAL BRANCH LEVEL      NO AORTIC DISSECTION OR OTHER ACUTE THORACIC AORTIC FINDINGS      NO AIRSPACE CONSOLIDATION, GROUND-GLASS AIRSPACE DISEASE OR ANY OTHER  SIGN OF ACTIVE INFECTION      NO PERICARDIAL EFFUSION      NO PNEUMOTHORAX      MACRO:  None      Signed by: Ru Owen 8/31/2024 4:22 PM  Dictation workstation:   [id number]  ECG 12 lead  Sinus rhythm with 1st degree AV block  Minimal voltage criteria for LVH, may be normal variant  Nonspecific ST abnormality  Abnormal ECG  When compared with ECG of 05-AUG-2024 00:27,  Minimal criteria for Septal infarct are no longer Present    LFT: No results found for: \"AST\", \"ALT\", \"ALKPHOS\", \"BILITOT\"   Urinalysis: No results found for: \"MONET\", \"PROTUR\", \"GLUCOSEU\", \"BLOODU\", \"KETONESU\", \"BILIRUBINU\", \"NITRITEU\", \"LEUKOCYTESU\", \"UTPCR\"   Imaging: CT angio chest for pulmonary embolism  Narrative: Interpreted By:  Ru Owen,   STUDY:  CT ANGIO CHEST FOR PULMONARY EMBOLISM;  8/31/2024 2:35 pm      INDICATION:  Signs/Symptoms:Tachycardia, chest pain, troponin elevation.          COMPARISON:  CTA abdomen and pelvis 8 September 2019; frontal chest 9 October 2018      ACCESSION NUMBER(S):  [id number]      ORDERING CLINICIAN:  RICARDA ROWLAND      TECHNIQUE:  Pulmonary arterial phase CT chest after the uneventful administration  of 75 mL IV contrast (Omnipaque 350).      Three dimensional maximum intensity projection (3-D MIPs) image/s  were created on a separate dedicated workstation, reviewed and saved      FINDINGS:  CARDIOVASCULAR:      Acute pulmonary embolism: Negative through the  segmental (third  order) branch level. Subsegmental and more distal branches not " well  enough opacified to evaluate. Acute right heart strain:  Negative. No  CT evidence of acute right heart strain Cardiac thrombus:  Negative;  no obvious right heart or other cardiac thrombus is seen Pulmonary  arteries ectasia:  Negative      Heart size:  Borderline  Pericardial effusion:  Negative      Thoracic aortic aneurysm:  Negative  Aortic dissection:  Negative      Heart failure change:  Negative.  No sign of interstitial or alveolar  edema. Other:  n/a      NONVASCULAR MEDIASTINUM:  Esophagus:  Grossly normal by CT  Mediastinal Mass:  Negative  Hiatal hernia:  Unchanged short/small probably sliding-type  Other:  n/a      LYMPH NODES:  No thoracic adenopathy      LUNGS / AIRSPACES / AIRWAYS:      LARGE AIRWAYS  Filling defect: Negative  Wall thickening: Negative  Bronchiectasis: Negative  Other: N/A      AIRSPACES  Fibrosis: Negative  Emphysema: Negative  Consolidation: Negative  Ground glass airspace disease: Negative  Edema: Negative  Nodule / Mass: Negative  Other: One or two segments of left lower lobe collapse. Milder  atelectasis in the right lower lobe. Motion artifact      PLEURA:  Effusion:  Left-greater-than-right small bilateral free-flowing  Pneumothorax:  Both sides negative  Other:  n/a      CHEST WALL:  Soft tissues of the chest wall are unremarkable      SKELETON:  No acute or contributory abnormality      UPPER ABDOMEN:  Vicariously excreted contrast from recent CTA is in the lumen, not an  acute pathologic process. High attenuation exophytic left renal  lesion is indeterminate but unchanged in size      Impression: LEFT-GREATER-THAN-RIGHT SMALL FREE-FLOWING BILATERAL PLEURAL EFFUSIONS      ONE OR TWO SEGMENTS OF LEFT LOWER LOBE COLLAPSE      NO OTHER ACUTE DISEASE IN THE CHEST          NO ACUTE PULMONARY EMBOLISM THROUGH THE SEGMENTAL BRANCH LEVEL      NO AORTIC DISSECTION OR OTHER ACUTE THORACIC AORTIC FINDINGS      NO AIRSPACE CONSOLIDATION, GROUND-GLASS AIRSPACE DISEASE OR ANY  OTHER  SIGN OF ACTIVE INFECTION      NO PERICARDIAL EFFUSION      NO PNEUMOTHORAX      MACRO:  None      Signed by: Ru Owen 8/31/2024 4:22 PM  Dictation workstation:   [id number]  ECG 12 lead  Sinus rhythm with 1st degree AV block  Minimal voltage criteria for LVH, may be normal variant  Nonspecific ST abnormality  Abnormal ECG  When compared with ECG of 05-AUG-2024 00:27,  Minimal criteria for Septal infarct are no longer Present           Assessment/  89 y.o. yo female admitted with cellulitis.  Has normal EF but has some edema.  Tried on lasix in July but na went to 123 and high k.  Says she isn't taking lasix anymore.  Used to be on hydrochlorothiazide but this was dcd 11/2023.  Has edema and pleural effusions and na in low to mid 120s        Plan/  I dont think she needs 3% saline.  No samsca for now but would add if not improved  Ordered urine na and osmolality yesterday  Check uric acid, tsh, am cortisol, serum osmolality  1200 fluid restrict for now  Outpatient follow up from renal standpoint: His/her regular nephrologist    Thank you for the consultation.  Please do not hesitate to call with questions.    Myron Canada MD

## 2024-09-01 NOTE — PROGRESS NOTES
"PROGRESS NOTE    ASSESSMENT AND PLAN:     #.  Sepsis secondary to left lower extremity cellulitis  -Presented with leukocytosis and tachycardia, diffuse erythema of left lower extremity without drainage  -She continues to have persistent leukocytosis, tachycardic and severe pain  -Seen by infectious disease, Ancef increased to 2 g IV.  -Cellulitis, overall improving     #.  Left knee pain 2/2 hemarthrosis of left knee  -Likely induced by trauma while on Coumadin, Doppler negative for DVT  -CT scan shows a large left knee hemarthrosis not significantly changed from 8/5  -Orthopedic surgery consult, attempted an aspiration unable to aspirate.     #. Troponin elevation  #. Chest pain  -ECG shows no acute ischemic changes  -Troponin was elevated at 667, trending down.  -Patient is allergic to Prilosec, family believes that the allergic reaction is hives    Plan:  -Continue Pepcid, Tums as needed  -Cardiology plans for cardiac cath on Monday  -GI, plans for an EGD once patient is stable from a cardiac standpoint     #.  Hyponatremia  -Likely hypotonic  -Improving, nephrology following.     #.  History of DVT in 2021  #.  Long-term anticoagulation  -Doppler negative for DVT  -Coumadin held due to supratherapeutic INR  -It appears that her DVT was provoked in 2021 when she had COVID-19.  Family will check with her doctor to see if she has any hypercoagulable disease.  If not patient can be discontinued off anticoagulation given that this is her first provoked DVT, and she has a history of multiple falls.           SUBJECTIVE:   Admit Date: 8/29/2024    Interval History: Complains of  pain in her leg no other active complaints.      OBJECTIVE:   Vitals: /63 (BP Location: Right arm, Patient Position: Lying)   Pulse 94   Temp 36.8 °C (98.2 °F) (Temporal)   Resp 20   Ht 1.575 m (5' 2.01\")   Wt 63 kg (139 lb)   SpO2 95%   BMI 25.42 kg/m²    Wt Readings from Last 3 Encounters:   08/29/24 63 kg (139 lb)   08/04/24 " "64.9 kg (143 lb)      24HR INTAKE/OUTPUT:    Intake/Output Summary (Last 24 hours) at 9/1/2024 1351  Last data filed at 9/1/2024 0744  Gross per 24 hour   Intake 340 ml   Output 1600 ml   Net -1260 ml       PHYSICAL EXAM:   GENERAL: Laying in bed, does not appear to be in any distress.   HEENT: HEAD: Normocephalic atraumatic.  Neck: Supple.  Eyes: Pupils are reactive to direct light.   CVS: S1, S2 heard. Regular rate and rhythm  LUNGS: Clear to auscultate bilaterally. No wheezing or rhonchi appreciated.  ABDOMEN: Soft, nontender to palpate. Positive bowel sounds. No guarding or rebound appreciated.  NEUROLOGICAL: No focal neurological deficits appreciated. Cranial nerves are grossly intact.  EXTREMITIES: Left knee edema, tenderness to palpate. Ecchymosis appreciated over left inner thigh. Erythema appreciated over left lower extremity.   SKIN:  Grossly intact, warm and dry.    LABS/IMAGING AND MEDICATIONS:   Scheduled Meds:aspirin, 81 mg, oral, Daily  calcium carbonate, 500 mg, oral, Daily  ceFAZolin, 1 g, intravenous, q8h  famotidine, 20 mg, oral, Daily  lactobacillus acidophilus, 1 tablet, oral, BID  lidocaine, 1 patch, transdermal, Daily  lisinopril, 20 mg, oral, Daily  metoprolol tartrate, 25 mg, oral, q12h  polyethylene glycol, 17 g, oral, Daily  sennosides-docusate sodium, 1 tablet, oral, Nightly  tolvaptan, 15 mg, oral, Daily      PRN Meds:PRN medications: acetaminophen **OR** acetaminophen **OR** acetaminophen, benzonatate, calcium carbonate, guaiFENesin, HYDROmorphone, melatonin, ondansetron **OR** ondansetron, oxyCODONE, oxyCODONE    No lab exists for component: \"CBC\"   No lab exists for component: \"CMP\"   No lab exists for component: \"TROPONIN\"      Results from last 7 days   Lab Units 09/01/24  1311   INR  1.6*     No lab exists for component: \"LIPIDS\"       No lab exists for component: \"URINALYSIS\"          BMP:  Results from last 7 days   Lab Units 09/01/24  0410 08/31/24 2006 08/31/24  0401   SODIUM " "mmol/L 125* 123* 123*   POTASSIUM mmol/L 4.2 4.4 4.3   CHLORIDE mmol/L 95* 95* 95*   CO2 mmol/L 24 20* 23   BUN mg/dL 17 18 21   CREATININE mg/dL 0.80 0.86 0.85       CBC:  Results from last 7 days   Lab Units 09/01/24  0410 08/31/24  0401 08/30/24  0431   WBC AUTO x10*3/uL 8.9 10.9 14.0*   RBC AUTO x10*6/uL 3.11* 3.23* 3.81*   HEMOGLOBIN g/dL 9.1* 9.6* 11.4*   HEMATOCRIT % 27.2* 28.1* 33.8*   MCV fL 88 87 89   MCH pg 29.3 29.7 29.9   MCHC g/dL 33.5 34.2 33.7   RDW % 12.6 12.5 12.7   PLATELETS AUTO x10*3/uL 340 345 396       Cardiac Enzymes:   Results from last 7 days   Lab Units 09/01/24  0410 08/31/24  0922   TROPHS ng/L 602* 674*   CK TOTAL U/L  --  54         Hepatic Function Panel:  Results from last 7 days   Lab Units 08/29/24  0108   ALK PHOS U/L 71   ALT U/L 8   AST U/L 22   PROTEIN TOTAL g/dL 6.8   BILIRUBIN TOTAL mg/dL 0.7       Magnesium:  Results from last 7 days   Lab Units 09/01/24  0410   MAGNESIUM mg/dL 1.72       Pro-BNP:  No results found for: \"PROBNP\"    INR:  Results from last 7 days   Lab Units 09/01/24  1311 09/01/24  0753 08/31/24  0922   PROTIME seconds 18.2* 19.6* 35.3*   INR  1.6* 1.7* 3.1*       TSH:  Lab Results   Component Value Date    TSH 1.51 08/30/2024       Lipid Profile:        No lab exists for component: \"LABVLDL\"    HgbA1C:        Lactate Level:  No lab exists for component: \"LACTA\"    CMP:  Results from last 7 days   Lab Units 09/01/24  0410 08/31/24  2006 08/31/24  0401 08/29/24  1208 08/29/24  0108   SODIUM mmol/L 125* 123* 123*   < > 128*   POTASSIUM mmol/L 4.2 4.4 4.3   < > 4.1   CHLORIDE mmol/L 95* 95* 95*   < > 96*   CO2 mmol/L 24 20* 23   < > 22   BUN mg/dL 17 18 21   < > 25*   CREATININE mg/dL 0.80 0.86 0.85   < > 1.13*   GLUCOSE mg/dL 94 101* 109*   < > 112*   CALCIUM mg/dL 8.6 8.6 8.7   < > 9.3   PROTEIN TOTAL g/dL  --   --   --   --  6.8   BILIRUBIN TOTAL mg/dL  --   --   --   --  0.7   ALK PHOS U/L  --   --   --   --  71   AST U/L  --   --   --   --  22   ALT U/L  " "--   --   --   --  8    < > = values in this interval not displayed.       Amylase:        Lipase:        ABG:        No lab exists for component: \"PO2\", \"PCO2\", \"HCO3\", \"BE\", \"O2SAT\"       "

## 2024-09-01 NOTE — CARE PLAN
Problem: Skin  Goal: Decreased wound size/increased tissue granulation at next dressing change  Flowsheets (Taken 8/31/2024 2313)  Decreased wound size/increased tissue granulation at next dressing change: Promote sleep for wound healing  Goal: Participates in plan/prevention/treatment measures  Flowsheets (Taken 8/31/2024 2313)  Participates in plan/prevention/treatment measures: Elevate heels  Goal: Prevent/manage excess moisture  Flowsheets (Taken 8/31/2024 2313)  Prevent/manage excess moisture: Moisturize dry skin  Goal: Prevent/minimize sheer/friction injuries  Flowsheets (Taken 8/31/2024 2313)  Prevent/minimize sheer/friction injuries: Increase activity/out of bed for meals  Goal: Promote/optimize nutrition  Flowsheets (Taken 8/31/2024 2313)  Promote/optimize nutrition:   Consume > 50% meals/supplements   Monitor/record intake including meals  Goal: Promote skin healing  Flowsheets (Taken 8/31/2024 2313)  Promote skin healing: Turn/reposition every 2 hours/use positioning/transfer devices   The patient's goals for the shift include  rest    The clinical goals for the shift include safety

## 2024-09-02 ENCOUNTER — APPOINTMENT (OUTPATIENT)
Dept: CARDIOLOGY | Facility: HOSPITAL | Age: 89
End: 2024-09-02
Payer: MEDICARE

## 2024-09-02 LAB
ANION GAP SERPL CALC-SCNC: 11 MMOL/L (ref 10–20)
ATRIAL RATE: 82 BPM
ATRIAL RATE: 84 BPM
BACTERIA BLD CULT: NORMAL
BACTERIA BLD CULT: NORMAL
BUN SERPL-MCNC: 18 MG/DL (ref 6–23)
CALCIUM SERPL-MCNC: 8.8 MG/DL (ref 8.6–10.3)
CARDIAC TROPONIN I PNL SERPL HS: 211 NG/L (ref 0–13)
CHLORIDE SERPL-SCNC: 99 MMOL/L (ref 98–107)
CO2 SERPL-SCNC: 24 MMOL/L (ref 21–32)
CORTIS AM PEAK SERPL-MSCNC: 19.9 UG/DL (ref 5–20)
CREAT SERPL-MCNC: 0.86 MG/DL (ref 0.5–1.05)
EGFRCR SERPLBLD CKD-EPI 2021: 64 ML/MIN/1.73M*2
ERYTHROCYTE [DISTWIDTH] IN BLOOD BY AUTOMATED COUNT: 12.7 % (ref 11.5–14.5)
GLUCOSE SERPL-MCNC: 97 MG/DL (ref 74–99)
HCT VFR BLD AUTO: 27.2 % (ref 36–46)
HGB BLD-MCNC: 9.1 G/DL (ref 12–16)
MAGNESIUM SERPL-MCNC: 1.82 MG/DL (ref 1.6–2.4)
MCH RBC QN AUTO: 29.5 PG (ref 26–34)
MCHC RBC AUTO-ENTMCNC: 33.5 G/DL (ref 32–36)
MCV RBC AUTO: 88 FL (ref 80–100)
NRBC BLD-RTO: 0 /100 WBCS (ref 0–0)
P AXIS: 25 DEGREES
P AXIS: 50 DEGREES
P OFFSET: 165 MS
P OFFSET: 165 MS
P ONSET: 108 MS
P ONSET: 115 MS
PLATELET # BLD AUTO: 350 X10*3/UL (ref 150–450)
POTASSIUM SERPL-SCNC: 4.1 MMOL/L (ref 3.5–5.3)
PR INTERVAL: 212 MS
PR INTERVAL: 212 MS
Q ONSET: 214 MS
Q ONSET: 221 MS
QRS COUNT: 14 BEATS
QRS COUNT: 14 BEATS
QRS DURATION: 104 MS
QRS DURATION: 106 MS
QT INTERVAL: 366 MS
QT INTERVAL: 368 MS
QTC CALCULATION(BAZETT): 429 MS
QTC CALCULATION(BAZETT): 432 MS
QTC FREDERICIA: 408 MS
QTC FREDERICIA: 409 MS
R AXIS: -25 DEGREES
R AXIS: -25 DEGREES
RBC # BLD AUTO: 3.08 X10*6/UL (ref 4–5.2)
SODIUM SERPL-SCNC: 130 MMOL/L (ref 136–145)
T AXIS: -16 DEGREES
T AXIS: -5 DEGREES
T OFFSET: 398 MS
T OFFSET: 404 MS
TSH SERPL-ACNC: 2.39 MIU/L (ref 0.44–3.98)
URATE SERPL-MCNC: 5.5 MG/DL (ref 2.3–6.7)
VENTRICULAR RATE: 82 BPM
VENTRICULAR RATE: 84 BPM
WBC # BLD AUTO: 11.1 X10*3/UL (ref 4.4–11.3)

## 2024-09-02 PROCEDURE — 83735 ASSAY OF MAGNESIUM: CPT | Performed by: HOSPITALIST

## 2024-09-02 PROCEDURE — 36415 COLL VENOUS BLD VENIPUNCTURE: CPT | Performed by: STUDENT IN AN ORGANIZED HEALTH CARE EDUCATION/TRAINING PROGRAM

## 2024-09-02 PROCEDURE — 80048 BASIC METABOLIC PNL TOTAL CA: CPT | Performed by: STUDENT IN AN ORGANIZED HEALTH CARE EDUCATION/TRAINING PROGRAM

## 2024-09-02 PROCEDURE — 84550 ASSAY OF BLOOD/URIC ACID: CPT | Performed by: INTERNAL MEDICINE

## 2024-09-02 PROCEDURE — 2500000005 HC RX 250 GENERAL PHARMACY W/O HCPCS: Performed by: STUDENT IN AN ORGANIZED HEALTH CARE EDUCATION/TRAINING PROGRAM

## 2024-09-02 PROCEDURE — 2500000002 HC RX 250 W HCPCS SELF ADMINISTERED DRUGS (ALT 637 FOR MEDICARE OP, ALT 636 FOR OP/ED): Performed by: INTERNAL MEDICINE

## 2024-09-02 PROCEDURE — 2500000004 HC RX 250 GENERAL PHARMACY W/ HCPCS (ALT 636 FOR OP/ED): Mod: JZ | Performed by: INTERNAL MEDICINE

## 2024-09-02 PROCEDURE — 2500000004 HC RX 250 GENERAL PHARMACY W/ HCPCS (ALT 636 FOR OP/ED): Performed by: HOSPITALIST

## 2024-09-02 PROCEDURE — 2500000001 HC RX 250 WO HCPCS SELF ADMINISTERED DRUGS (ALT 637 FOR MEDICARE OP): Performed by: NURSE PRACTITIONER

## 2024-09-02 PROCEDURE — 97535 SELF CARE MNGMENT TRAINING: CPT | Mod: GO,CO

## 2024-09-02 PROCEDURE — 85027 COMPLETE CBC AUTOMATED: CPT | Performed by: STUDENT IN AN ORGANIZED HEALTH CARE EDUCATION/TRAINING PROGRAM

## 2024-09-02 PROCEDURE — 93010 ELECTROCARDIOGRAM REPORT: CPT | Performed by: INTERNAL MEDICINE

## 2024-09-02 PROCEDURE — 2500000001 HC RX 250 WO HCPCS SELF ADMINISTERED DRUGS (ALT 637 FOR MEDICARE OP): Performed by: HOSPITALIST

## 2024-09-02 PROCEDURE — 84443 ASSAY THYROID STIM HORMONE: CPT | Performed by: INTERNAL MEDICINE

## 2024-09-02 PROCEDURE — 99233 SBSQ HOSP IP/OBS HIGH 50: CPT | Performed by: INTERNAL MEDICINE

## 2024-09-02 PROCEDURE — 1200000002 HC GENERAL ROOM WITH TELEMETRY DAILY

## 2024-09-02 PROCEDURE — 93005 ELECTROCARDIOGRAM TRACING: CPT

## 2024-09-02 PROCEDURE — 2500000004 HC RX 250 GENERAL PHARMACY W/ HCPCS (ALT 636 FOR OP/ED): Performed by: STUDENT IN AN ORGANIZED HEALTH CARE EDUCATION/TRAINING PROGRAM

## 2024-09-02 PROCEDURE — 99232 SBSQ HOSP IP/OBS MODERATE 35: CPT | Performed by: HOSPITALIST

## 2024-09-02 PROCEDURE — 84484 ASSAY OF TROPONIN QUANT: CPT | Performed by: INTERNAL MEDICINE

## 2024-09-02 PROCEDURE — 99233 SBSQ HOSP IP/OBS HIGH 50: CPT | Performed by: NURSE PRACTITIONER

## 2024-09-02 RX ORDER — FUROSEMIDE 40 MG/1
20 TABLET ORAL ONCE
Status: COMPLETED | OUTPATIENT
Start: 2024-09-02 | End: 2024-09-02

## 2024-09-02 ASSESSMENT — ACTIVITIES OF DAILY LIVING (ADL): HOME_MANAGEMENT_TIME_ENTRY: 8

## 2024-09-02 ASSESSMENT — COGNITIVE AND FUNCTIONAL STATUS - GENERAL
MOVING FROM LYING ON BACK TO SITTING ON SIDE OF FLAT BED WITH BEDRAILS: A LOT
TURNING FROM BACK TO SIDE WHILE IN FLAT BAD: A LITTLE
HELP NEEDED FOR BATHING: A LITTLE
TOILETING: A LITTLE
TURNING FROM BACK TO SIDE WHILE IN FLAT BAD: A LOT
STANDING UP FROM CHAIR USING ARMS: A LOT
TOILETING: A LITTLE
MOBILITY SCORE: 10
CLIMB 3 TO 5 STEPS WITH RAILING: TOTAL
DAILY ACTIVITIY SCORE: 19
TOILETING: TOTAL
DRESSING REGULAR LOWER BODY CLOTHING: A LITTLE
CLIMB 3 TO 5 STEPS WITH RAILING: TOTAL
WALKING IN HOSPITAL ROOM: A LOT
DRESSING REGULAR UPPER BODY CLOTHING: A LITTLE
HELP NEEDED FOR BATHING: TOTAL
DRESSING REGULAR LOWER BODY CLOTHING: TOTAL
WALKING IN HOSPITAL ROOM: TOTAL
HELP NEEDED FOR BATHING: A LITTLE
DAILY ACTIVITIY SCORE: 13
STANDING UP FROM CHAIR USING ARMS: A LITTLE
MOVING TO AND FROM BED TO CHAIR: A LOT
PERSONAL GROOMING: A LITTLE
DRESSING REGULAR LOWER BODY CLOTHING: A LITTLE
CLIMB 3 TO 5 STEPS WITH RAILING: TOTAL
MOBILITY SCORE: 16
MOVING TO AND FROM BED TO CHAIR: A LITTLE
TURNING FROM BACK TO SIDE WHILE IN FLAT BAD: A LITTLE
WALKING IN HOSPITAL ROOM: A LOT
PERSONAL GROOMING: A LITTLE
DRESSING REGULAR UPPER BODY CLOTHING: A LITTLE
PERSONAL GROOMING: A LITTLE
DRESSING REGULAR UPPER BODY CLOTHING: A LITTLE
STANDING UP FROM CHAIR USING ARMS: A LITTLE
DAILY ACTIVITIY SCORE: 19
MOBILITY SCORE: 16
MOVING TO AND FROM BED TO CHAIR: A LITTLE

## 2024-09-02 ASSESSMENT — PAIN SCALES - GENERAL
PAINLEVEL_OUTOF10: 6
PAINLEVEL_OUTOF10: 10 - WORST POSSIBLE PAIN
PAINLEVEL_OUTOF10: 0 - NO PAIN
PAINLEVEL_OUTOF10: 8
PAINLEVEL_OUTOF10: 9
PAINLEVEL_OUTOF10: 9

## 2024-09-02 ASSESSMENT — PAIN - FUNCTIONAL ASSESSMENT
PAIN_FUNCTIONAL_ASSESSMENT: 0-10

## 2024-09-02 ASSESSMENT — PAIN DESCRIPTION - ORIENTATION
ORIENTATION: LEFT
ORIENTATION: LEFT

## 2024-09-02 ASSESSMENT — PAIN DESCRIPTION - LOCATION
LOCATION: KNEE
LOCATION: KNEE

## 2024-09-02 NOTE — CARE PLAN
Problem: Safety - Adult  Goal: Free from fall injury  Outcome: Progressing     Problem: Chronic Conditions and Co-morbidities  Goal: Patient's chronic conditions and co-morbidity symptoms are monitored and maintained or improved  Outcome: Progressing     Problem: Skin  Goal: Decreased wound size/increased tissue granulation at next dressing change  Outcome: Progressing  Natanael (Taken 9/1/2024 2318 by Albertina Lam RN)  Decreased wound size/increased tissue granulation at next dressing change: Promote sleep for wound healing  Goal: Participates in plan/prevention/treatment measures  Outcome: Progressing  Natanael (Taken 9/1/2024 2318 by Albertina Lam RN)  Participates in plan/prevention/treatment measures: Elevate heels  Goal: Prevent/manage excess moisture  Outcome: Progressing  Natanael (Taken 9/1/2024 2318 by Albertina Lam RN)  Prevent/manage excess moisture: Moisturize dry skin  Goal: Prevent/minimize sheer/friction injuries  Outcome: Progressing  Natanael (Taken 9/1/2024 2318 by Albertina Lam RN)  Prevent/minimize sheer/friction injuries: Increase activity/out of bed for meals  Goal: Promote/optimize nutrition  Outcome: Progressing  Natanael (Taken 9/1/2024 2318 by Albertina Lam RN)  Promote/optimize nutrition:   Consume > 50% meals/supplements   Monitor/record intake including meals  Goal: Promote skin healing  Outcome: Progressing  Natanael (Taken 9/1/2024 2318 by Albertina Lam RN)  Promote skin healing: Turn/reposition every 2 hours/use positioning/transfer devices

## 2024-09-02 NOTE — PROGRESS NOTES
Physical Therapy    Physical Therapy Treatment    Patient Name: Sommer Farmer  MRN: [id number]  Today's Date: 9/2/2024  Time Calculation  Start Time: 1102  Stop Time: 1119  Time Calculation (min): 17 min     806/806-A    Assessment/Plan   End of Session Communication: Bedside nurse, PCT/NA/CTA  Assessment Comment: Patient presents with good effort throughout todays session although poor tolerance to activity. Pain and quick fatigue limiting standing tolerance and gait training this session. Patient caregiver arrived towards end of session. Call light and all needs in reach.  End of Session Patient Position: Alarm on, Bed, 3 rail up (HOB elevated to ~60 degrees)      General Visit Information:   PT  Visit  PT Received On: 09/02/24  General  Co-Treatment: OT  Co-Treatment Reason: Due to pt's significant debility for optimal safety and therapy session  Prior to Session Communication: Bedside nurse  Patient Position Received: Bed, 3 rail up, Alarm on  General Comment: Pt agreeable to therapy this date.  Subjective     Precautions:  Precautions  LE Weight Bearing Status: Weight Bearing as Tolerated (Per eval KI for confort however KI not available/in room)  Medical Precautions: Fall precautions  Precautions Comment: Tachycardia with activity; Limb alert LUE    Vital Signs:  Vital Signs  Heart Rate: 87 (seated EOB)  Objective     Pain:  Pain Assessment  Pain Assessment: 0-10  0-10 (Numeric) Pain Score: 6  Pain Type: Acute pain  Pain Location: Knee  Pain Orientation: Left    Cognition:  Cognition  Overall Cognitive Status: Within Functional Limits  Orientation Level: Oriented X4         Treatments:        Balance/Neuromuscular Re-Education  Balance/Neuromuscular Re-Education Activity Performed: Yes  Balance/Neuromuscular Re-Education Activity 1: Pt performed static standing balance with FWW ModA x2 due to weakness and instability which resolved with static standing. Pt tolerated ~5 minutes standing before requiring seated  rest.  Bed Mobility  Bed Mobility: Yes  Bed Mobility 1  Bed Mobility 1: Supine to sitting  Level of Assistance 1: Moderate assistance, +2  Bed Mobility Comments 1: Pt performed supine<>EOB ModA x2 without use of bedrails to lift trunk. Pt demonstrates ability to partially walk BLE off edge of bed.  Bed Mobility 2  Bed Mobility  2: Sitting to supine  Level of Assistance 2: Moderate assistance, +2  Bed Mobility Comments 2: Pt performed EOB<>supine ModA x2, unable to lift LLE into bed and able to provide partial assist in lowering trunk with UEs.     Transfers  Transfer: Yes  Transfer 1  Transfer From 1: Bed to  Transfer to 1: Stand  Technique 1: Sit to stand, Stand to sit  Transfer Device 1: Walker (FWW)  Transfer Level of Assistance 1: Moderate assistance, +2, Maximum verbal cues  Trials/Comments 1: Pt performed STS from EOB<>FWW ModA x2 with poor standing tolerance this date. Max VC required for sequencing as pt continues to attempt to pull up from walker despite cues. Increased discomfort in L knee.          Outcome Measures:     [facility] Basic Mobility  Turning from your back to your side while in a flat bed without using bedrails: A lot  Moving from lying on your back to sitting on the side of a flat bed without using bedrails: A lot  Moving to and from bed to chair (including a wheelchair): A lot  Standing up from a chair using your arms (e.g. wheelchair or bedside chair): A lot  To walk in hospital room: Total  Climbing 3-5 steps with railing: Total  Basic Mobility - Total Score: 10                                      Education Documentation  Mobility Training, taught by Cass Ellis PTA at 9/2/2024  1:51 PM.  Learner: Caregiver, Patient  Readiness: Acceptance  Method: Explanation, Demonstration  Response: Verbalizes Understanding, Needs Reinforcement    Education Comments  No comments found.           EDUCATION:  Outpatient Education  Education Comment: Education provided on benefits of therapy and improving  standing tolerance, positioning, and strengthening.  Encounter Problems       Encounter Problems (Active)       Impaired mobility        Perform all bed mobility with minAx1 (Progressing)       Start:  08/30/24    Expected End:  09/13/24            Perform all transfers with ww and minAx1 (Progressing)       Start:  08/30/24    Expected End:  09/13/24            Patient will ambulate 25 ft with ww and minAx1 (Not Progressing)       Start:  08/30/24    Expected End:  09/13/24            Patient will perform BLE HEP with supervision to improve strength and L knee ROM  (Not Progressing)       Start:  08/30/24    Expected End:  09/13/24            Patient will tolerate sitting EOB >/= 5 min with supervision  (Progressing)       Start:  08/30/24    Expected End:  09/13/24               Pain - Adult

## 2024-09-02 NOTE — PROGRESS NOTES
Occupational Therapy    OT Treatment    Patient Name: Sommer Farmer  MRN: [id number]  Today's Date: 9/2/2024  Time Calculation  Start Time: 1103  Stop Time: 1120  Time Calculation (min): 17 min        Assessment:  End of Session Communication: Bedside nurse  End of Session Patient Position: Bed, 3 rail up, Alarm on     Plan:  Treatment Interventions: ADL retraining, Functional transfer training, Endurance training, Patient/family training, Equipment evaluation/education, Compensatory technique education  OT Frequency: 3 times per week  OT Discharge Recommendations: Moderate intensity level of continued care  OT - OK to Discharge: Yes (when medically appropriate)  Treatment Interventions: ADL retraining, Functional transfer training, Endurance training, Patient/family training, Equipment evaluation/education, Compensatory technique education    Subjective   Previous Visit Info:  OT Last Visit  OT Received On: 09/02/24  General:  General  Co-Treatment Reason: cotreat with PT to increase pt safety and indep with functional transfers/ADLs  Prior to Session Communication: Bedside nurse  Patient Position Received: Bed, 3 rail up, Alarm on  General Comment: pt agreeable to OT tx  Precautions:  LE Weight Bearing Status:  (WBAT LLE, pt can use KI for confort however KI not available)  Medical Precautions: Fall precautions            Pain:  Pain Assessment  Pain Assessment: 0-10  0-10 (Numeric) Pain Score:  (unrated pain in L knee)    Objective    Cognition:  Cognition  Overall Cognitive Status: Within Functional Limits  Orientation Level: Oriented X4  Coordination:     Activities of Daily Living: LE Dressing  LE Dressing:  (pt required max A for LB dressing tasks, pt required max A for threading shorts over BLE and max A for clothing mgmt up/down B hips)    Toileting  Toileting Comments: pt required total A for posterior socorro hygiene while standing  Functional Standing Tolerance:     Bed Mobility/Transfers:  Transfers  Transfer:  (pt required mod A x2 for sit < > stand transfers with use of FWw and required max Vc for safe hand placement when performing)      Outcome Measures:[facility] Daily Activity  Putting on and taking off regular lower body clothing: Total  Bathing (including washing, rinsing, drying): Total  Putting on and taking off regular upper body clothing: A little  Toileting, which includes using toilet, bedpan or urinal: Total  Taking care of personal grooming such as brushing teeth: A little  Eating Meals: None  Daily Activity - Total Score: 13        Education Documentation  Body Mechanics, taught by CHRISTINA Palmer at 9/2/2024 11:38 AM.  Learner: Patient  Readiness: Acceptance  Method: Explanation  Response: Needs Reinforcement  Comment: pt educated on OT POC and EC techs    Precautions, taught by CHRISTINA Palmer at 9/2/2024 11:38 AM.  Learner: Patient  Readiness: Acceptance  Method: Explanation  Response: Needs Reinforcement  Comment: pt educated on OT POC and EC techs    ADL Training, taught by CHRISTINA Palmer at 9/2/2024 11:38 AM.  Learner: Patient  Readiness: Acceptance  Method: Explanation  Response: Needs Reinforcement  Comment: pt educated on OT POC and EC techs    Education Comments  No comments found.        OP EDUCATION:       Goals:  Encounter Problems       Encounter Problems (Active)       OT Goals       Mod assist bed mobility.  (Progressing)       Start:  08/30/24    Expected End:  09/15/24            Mod assist sit/stand, bed/chair/commode with FWW. (Progressing)       Start:  08/30/24    Expected End:  09/15/24            Good dynamic sitting for ADL.  (Progressing)       Start:  08/30/24    Expected End:  09/15/24            Poor (+) dynamic standing for ADL with UE support.  (Progressing)       Start:  08/30/24    Expected End:  09/15/24            Tolerate 10 mins light functional activity.  (Progressing)       Start:  08/30/24    Expected End:  09/15/24

## 2024-09-02 NOTE — PROGRESS NOTES
CARDIOLOGY [facility] PROGRESS NOTE      Rounding Date:                9/2/2024    Patient:                               Sommer Farmer     YOB: 1933  MRN:                                   [id number]    Rounding cardiologist:    Rico Moy MD , [facility]            Primary cardiologist:         Dr. Billy, [facility] cardiology     Reason for Initial Consultation:  Chest pain, elevated troponins.    SUBJECTIVE:     9/2/24:  Patient with presented with chest pain symptoms and elevated troponins consistent with non-ST elevation MI.  Scheduled for cardiac catheterization in the morning.  Warfarin has been held.  Patient remains in sinus rhythm.  Vital signs are stable.  Sodium is stable at 125.  Hemoglobin 9.1.  Nephrology on case.  She has no new symptoms.  She agrees to proceed with cardiac catheterization possible angioplasty in the morning. Discussed with patient and daughter.  Risk goals and alternatives of the procedure were discussed in detail the patient.    9/1/24:  The patient appears to be doing well overall.    She has some burning with swallowing water.  She has no other cardiovascular complaints currently.  The sodium was 125 was 123.  Hemoglobin 9.2.  Coumadin is held.  INR is 1.7,  was 3.1.  Remains in atrial fibrillation.  Cardiac catheterization given elevated troponins plan for Tuesday morning.  Patient agrees and wishes to proceed.    Cardiac and general ROS otherwise negative and unchanged.  A    ASSESSMENT:     Left lower extremity cellulitis with sepsis  Left knee hemarthrosis  Elevated D-dimer  Hyponatremia  Chest pain symptoms  Tachycardia  Non-ST elevation MI, elevated troponins (600)  Atrial fibrillation, prior history with COVID, remains in SR.  LTAC, Coumadin  Hypertension  Anemia  History of prior deep venous thrombosis, 2021, negative venous duplex 8/20/2024  GERD  Varicose vein history  Breast cancer history of mastectomy 2006  COVID  pneumonia January 2021  Degenerative joint disease  Prior ventral hernia repair  Prior appendectomy  Tobacco abuse history  Family history of coronary artery disease  Otherwise as per assessment below.     PLAN:     Cardiovascular Supportive Care.  Telemetry Monitoring.  [facility]   Echocardiogram, pending  [place] for hyponatremia.  CTA chest, rule out pulmonary embolism if primary agrees primary service.  Cardiac catheterization post PCI planned for Tuesday AM.  Coumadin currently being held, INR 1.7 9/1/24, continue to hold, with heparin IV Gtt til cath.  Further Recommendations to Follow.  See Orders.    HPI:      Electronic medical records were reviewed. Patient was interviewed and examined.     The patient's problems are listed in the impression above.         Sommer Farmer  was seen in cardiology consultation at the [facility] 8/31/2024.       Sommer Farmer is a 89 y.o. female presenting with left knee pain.  Patient states that she has had a hematoma in her left knee for the last 3 weeks.  She was evaluated and states that it was getting better.  Today she returns with worsening pain.  It has been limiting her ambulation.  Her left calf is also gotten more swollen and red.  She denies any drainage from the area.  Denies any fever or chills.     ER Course: /58, , RR 29, T36.9.  Labs notable for sodium 128, creatinine 1.13, BUN 25, INR 3.3, WBC 14.7.  X-ray of left knee showed grossly stable large joint effusion.  CT angio of left lower extremity was obtained which showed no evidence of arterial vascular injury and also revealed large left knee hemarthrosis not significantly changed.  Patient was given pain control and started on Ancef.     Patient denies Chest Pain, SOB, Lightheadedness, Dizziness, TIA or CVA symptoms.  No CHF or Edema.  No Palpitations.  No GI,  or Bleeding Issues. No Recent Fever or Chills.      Cardiovascular and general review of systems is  "otherwise negative.       MEDICATIONS:      aspirin, 81 mg, oral, Daily  calcium carbonate, 500 mg, oral, Daily  ceFAZolin, 1 g, intravenous, q8h  famotidine, 20 mg, oral, Daily  lactobacillus acidophilus, 1 tablet, oral, BID  lidocaine, 1 patch, transdermal, Daily  lisinopril, 20 mg, oral, Daily  metoprolol tartrate, 25 mg, oral, q12h  polyethylene glycol, 17 g, oral, Daily  sennosides-docusate sodium, 1 tablet, oral, Nightly        PHYSICAL EXAM:       CURRENT VITALS: /63   Pulse 92   Temp 36.9 °C (98.4 °F)   Resp 20   Ht 1.575 m (5' 2.01\")   Wt 63 kg (139 lb)   SpO2 97%   BMI 25.42 kg/m²     General: No acute distress. Alert and oriented.  Head And Neck Examination: No jugular venous distention, no carotid bruits, no mass. Carotid upstrokes preserved. Oral mucosa moist.  No xanthelasma. Head and neck examination otherwise unremarkable.  Lungs: Clear to auscultation and percussion. No wheezes, no rales,  and no rhonchi.  Chest: Excursion appeared to be normal. No chest wall tenderness on palpation.  Heart: Normal S1 and S2. No S3. No S4. No rub. Grade 1/6 systolic murmur, best heard at the left sternal border. Point of maximal impulse was within normal limits.  Abdomen: Soft. Nontender. No organomegaly. No bruits. No masses. Obese.  Extremities: No bipedal edema. No clubbing. No cyanosis.  Pulses are strong throughout. No bruits.  Left leg dressing knee.  Musculoskeletal Exam: No ulcers, otherwise unremarkable.  Neuro: Neurologically appeared grossly intact.    LABORATORY STUDIES:     CMP:  Results from last 7 days   Lab Units 09/02/24  0411 09/01/24  1834 09/01/24  0410 08/29/24  1208 08/29/24  0108   SODIUM mmol/L 130* 125* 125*   < > 128*   POTASSIUM mmol/L 4.1 4.4 4.2   < > 4.1   CHLORIDE mmol/L 99 96* 95*   < > 96*   CO2 mmol/L 24 20* 24   < > 22   BUN mg/dL 18 19 17   < > 25*   CREATININE mg/dL 0.86 0.97 0.80   < > 1.13*   GLUCOSE mg/dL 97 120* 94   < > 112*   CALCIUM mg/dL 8.8 8.8 8.6   < > 9.3 "   PROTEIN TOTAL g/dL  --   --   --   --  6.8   BILIRUBIN TOTAL mg/dL  --   --   --   --  0.7   ALK PHOS U/L  --   --   --   --  71   AST U/L  --   --   --   --  22   ALT U/L  --   --   --   --  8    < > = values in this interval not displayed.       CBC:  Results from last 7 days   Lab Units 24  0411 24  0410 24  0401   WBC AUTO x10*3/uL 11.1 8.9 10.9   RBC AUTO x10*6/uL 3.08* 3.11* 3.23*   HEMOGLOBIN g/dL 9.1* 9.1* 9.6*   HEMATOCRIT % 27.2* 27.2* 28.1*   MCV fL 88 88 87   MCH pg 29.5 29.3 29.7   MCHC g/dL 33.5 33.5 34.2   RDW % 12.7 12.6 12.5   PLATELETS AUTO x10*3/uL 350 340 345     Cardiac Enzymes:   Results from last 7 days   Lab Units 24  0922   CK TOTAL U/L 54     Hepatic Function Panel:  Results from last 7 days   Lab Units 24  0108   ALK PHOS U/L 71   ALT U/L 8   AST U/L 22   PROTEIN TOTAL g/dL 6.8   BILIRUBIN TOTAL mg/dL 0.7       Magnesium:  Results from last 7 days   Lab Units 24  0411   MAGNESIUM mg/dL 1.82     INR:  Results from last 7 days   Lab Units 24  1311 24  0753 24  0922   PROTIME seconds 18.2* 19.6* 35.3*   INR  1.6* 1.7* 3.1*     TSH:  Lab Results   Component Value Date    TSH 2.39 2024     TESTING RESULTS:     EK/31/24  Sinus rhythm with 1st degree AV block  Minimal voltage criteria for LVH, may be normal variant  Nonspecific ST abnormality  Abnormal ECG    Echo:   See Report        Problem List:     Patient Active Problem List   Diagnosis    Hemarthrosis    Sepsis due to cellulitis (Multi)    Current use of long term anticoagulation    History of DVT (deep vein thrombosis)    Hyponatremia           Rico Moy MD,[facility]

## 2024-09-02 NOTE — PROGRESS NOTES
PROGRESS NOTE    ASSESSMENT AND PLAN:     #. Troponin elevation  #. Chest pain  -ECG shows no acute ischemic changes  -Troponin was elevated at 667, trending down.  -CT angio showed negative for pulmonary embolism, shows pleural effusion    Plan:  -Continue aspirin  -Not a candidate for IV heparin due to hemarthrosis of left knee  -Seen by cardiology, plan for cardiac cath Tuesday  -Echocardiogram pending    #.  Sepsis secondary to left lower extremity cellulitis  -Presented with leukocytosis and tachycardia, diffuse erythema of left lower extremity without drainage  -She continues to have persistent leukocytosis, tachycardic and severe pain  -Seen by infectious disease, continue Ancef, can change to Keflex on discharge.  -Cellulitis, overall improving     #.  Left knee pain 2/2 hemarthrosis of left knee  -Likely induced by trauma while on Coumadin, Doppler negative for DVT  -CT scan shows a large left knee hemarthrosis not significantly changed from 8/5  -Orthopedic surgery consult, attempted an aspiration unable to aspirate.  -Overall improving     #. Gastroesophageal reflux disease  -Continues to have chest pain with eating.  -Seen by GI, plan for an esophagram, possible EGD once patient's cardiac issues have been resolved.  -Patient is allergic to PPIs, continue famotidine and Tums as needed.    #.  Acute hypoxic respiratory failure  #.  Pleural effusion  -Due to patient's hyponatremia, unable to give diuretic.  -Today hyponatremia improved, given 1 dose of Lasix 20 mg.      #.  Hyponatremia  -Status post 1 dose of tolvaptan  -Serum sodium improving.     #.  History of DVT in 2021  #.  Long-term anticoagulation  -Doppler negative for DVT  -Supratherapeutic INR on admission.  -It appears that her DVT was provoked in 2021 when she had COVID-19.  Family will check with her doctor to see if she has any hypercoagulable disease.  If not patient can be discontinued off anticoagulation given that this is her first  "provoked DVT, and she has a history of multiple falls.      Disposition: Once patient is cleared from a cardiac and GI standpoint, can be discharged to skilled nursing facility.    SUBJECTIVE:   Admit Date: 8/29/2024    Interval History: Feels okay, complains of left knee pain however overall improved.  Denies chest pain, shortness of breath.      OBJECTIVE:   Vitals: /63   Pulse 92   Temp 36.9 °C (98.4 °F) (Temporal)   Resp 18   Ht 1.575 m (5' 2.01\")   Wt 63 kg (139 lb)   SpO2 97%   BMI 25.42 kg/m²    Wt Readings from Last 3 Encounters:   08/29/24 63 kg (139 lb)   08/04/24 64.9 kg (143 lb)      24HR INTAKE/OUTPUT:    Intake/Output Summary (Last 24 hours) at 9/2/2024 1346  Last data filed at 9/1/2024 1700  Gross per 24 hour   Intake 300 ml   Output 500 ml   Net -200 ml       PHYSICAL EXAM:   GENERAL: Laying in bed, does not appear to be in any distress.   HEENT: HEAD: Normocephalic atraumatic.  Neck: Supple.  Eyes: Pupils are reactive to direct light.   CVS: S1, S2 heard. Regular rate and rhythm  LUNGS: Clear to auscultate bilaterally. No wheezing or rhonchi appreciated.  ABDOMEN: Soft, nontender to palpate. Positive bowel sounds. No guarding or rebound appreciated.  NEUROLOGICAL: No focal neurological deficits appreciated. Cranial nerves are grossly intact.  EXTREMITIES:  Left knee edema, tenderness to palpate. Ecchymosis appreciated over left inner thigh. Erythema improving over left lower extremity.    SKIN:  Grossly intact, warm and dry.      LABS/IMAGING AND MEDICATIONS:   Scheduled Meds:aspirin, 81 mg, oral, Daily  calcium carbonate, 500 mg, oral, Daily  ceFAZolin, 1 g, intravenous, q8h  famotidine, 20 mg, oral, Daily  lactobacillus acidophilus, 1 tablet, oral, BID  lidocaine, 1 patch, transdermal, Daily  lisinopril, 20 mg, oral, Daily  metoprolol tartrate, 25 mg, oral, q12h  polyethylene glycol, 17 g, oral, Daily  sennosides-docusate sodium, 1 tablet, oral, Nightly      PRN Meds:PRN medications: " "acetaminophen **OR** acetaminophen **OR** acetaminophen, benzonatate, calcium carbonate, guaiFENesin, HYDROmorphone, melatonin, ondansetron **OR** ondansetron, oxyCODONE, oxyCODONE    No lab exists for component: \"CBC\"   No lab exists for component: \"CMP\"   No lab exists for component: \"TROPONIN\"      Results from last 7 days   Lab Units 09/01/24  1311   INR  1.6*     No lab exists for component: \"LIPIDS\"       No lab exists for component: \"URINALYSIS\"          BMP:  Results from last 7 days   Lab Units 09/02/24  0411 09/01/24  1834 09/01/24  0410   SODIUM mmol/L 130* 125* 125*   POTASSIUM mmol/L 4.1 4.4 4.2   CHLORIDE mmol/L 99 96* 95*   CO2 mmol/L 24 20* 24   BUN mg/dL 18 19 17   CREATININE mg/dL 0.86 0.97 0.80       CBC:  Results from last 7 days   Lab Units 09/02/24  0411 09/01/24  0410 08/31/24  0401   WBC AUTO x10*3/uL 11.1 8.9 10.9   RBC AUTO x10*6/uL 3.08* 3.11* 3.23*   HEMOGLOBIN g/dL 9.1* 9.1* 9.6*   HEMATOCRIT % 27.2* 27.2* 28.1*   MCV fL 88 88 87   MCH pg 29.5 29.3 29.7   MCHC g/dL 33.5 33.5 34.2   RDW % 12.7 12.6 12.5   PLATELETS AUTO x10*3/uL 350 340 345       Cardiac Enzymes:   Results from last 7 days   Lab Units 09/02/24  0411 09/01/24  0410 08/31/24  0922   TROPHS ng/L 211* 602* 674*   CK TOTAL U/L  --   --  54         Hepatic Function Panel:  Results from last 7 days   Lab Units 08/29/24  0108   ALK PHOS U/L 71   ALT U/L 8   AST U/L 22   PROTEIN TOTAL g/dL 6.8   BILIRUBIN TOTAL mg/dL 0.7       Magnesium:  Results from last 7 days   Lab Units 09/02/24  0411   MAGNESIUM mg/dL 1.82       Pro-BNP:  No results found for: \"PROBNP\"    INR:  Results from last 7 days   Lab Units 09/01/24  1311 09/01/24  0753 08/31/24  0922   PROTIME seconds 18.2* 19.6* 35.3*   INR  1.6* 1.7* 3.1*       TSH:  Lab Results   Component Value Date    TSH 2.39 09/02/2024       Lipid Profile:        No lab exists for component: \"LABVLDL\"    HgbA1C:        Lactate Level:  No lab exists for component: \"LACTA\"    CMP:  Results from " "last 7 days   Lab Units 09/02/24  0411 09/01/24  1834 09/01/24  0410 08/29/24  1208 08/29/24  0108   SODIUM mmol/L 130* 125* 125*   < > 128*   POTASSIUM mmol/L 4.1 4.4 4.2   < > 4.1   CHLORIDE mmol/L 99 96* 95*   < > 96*   CO2 mmol/L 24 20* 24   < > 22   BUN mg/dL 18 19 17   < > 25*   CREATININE mg/dL 0.86 0.97 0.80   < > 1.13*   GLUCOSE mg/dL 97 120* 94   < > 112*   CALCIUM mg/dL 8.8 8.8 8.6   < > 9.3   PROTEIN TOTAL g/dL  --   --   --   --  6.8   BILIRUBIN TOTAL mg/dL  --   --   --   --  0.7   ALK PHOS U/L  --   --   --   --  71   AST U/L  --   --   --   --  22   ALT U/L  --   --   --   --  8    < > = values in this interval not displayed.       Amylase:        Lipase:        ABG:        No lab exists for component: \"PO2\", \"PCO2\", \"HCO3\", \"BE\", \"O2SAT\"       "

## 2024-09-02 NOTE — PROGRESS NOTES
"Renal Progress Note    Assessment and Plan:    89 y.o. yo female admitted with cellulitis.  Has normal EF but has some edema.  Tried on lasix in July but na went to 123 and high k.  Says she isn't taking lasix anymore.  Used to be on hydrochlorothiazide but this was dcd 11/2023.  Has edema and pleural effusions and na in low to mid 120s           Plan/  Ordered urine na and osmolality on 8/31.  Wasn't sent.  Limited utility now after getting [facility] as will alter the results  Check uric acid, tsh, am cortisol, serum osmolality - ordered as add on tests to am labs  1200 fluid restrict for now  On discharge would just send out off lasix (she says she has been off it)  Has seen [facility] nephrology before  Outpatient follow up from renal standpoint: His/her regular nephrologist    Subjective:   Admit Date: 8/29/2024    Interval History: na level up to 130.  Written for [facility] by cardiology.  Bp good.        Medications:   Scheduled Meds:aspirin, 81 mg, oral, Daily  calcium carbonate, 500 mg, oral, Daily  ceFAZolin, 1 g, intravenous, q8h  famotidine, 20 mg, oral, Daily  lactobacillus acidophilus, 1 tablet, oral, BID  lidocaine, 1 patch, transdermal, Daily  lisinopril, 20 mg, oral, Daily  metoprolol tartrate, 25 mg, oral, q12h  polyethylene glycol, 17 g, oral, Daily  sennosides-docusate sodium, 1 tablet, oral, Nightly  tolvaptan, 15 mg, oral, Daily      Continuous Infusions:     CBC:   Lab Results   Component Value Date    HGB 9.1 (L) 09/02/2024    HGB 9.1 (L) 09/01/2024    WBC 11.1 09/02/2024    WBC 8.9 09/01/2024     09/02/2024     09/01/2024      Anemia:  No results found for: \"FERRITIN\", \"IRON\", \"TIBC\"   BMP:    Lab Results   Component Value Date     (L) 09/02/2024     (L) 09/01/2024    K 4.1 09/02/2024    K 4.4 09/01/2024    CL 99 09/02/2024    CL 96 (L) 09/01/2024    CO2 24 09/02/2024    CO2 20 (L) 09/01/2024    BUN 18 09/02/2024    BUN 19 09/01/2024    CREATININE 0.86 09/02/2024    CREATININE " "0.97 09/01/2024      Bone disease: No results found for: \"PHOS\", \"PTH\", \"VITD25\"   Urinalysis:  No results found for: \"MONET\", \"PROTUR\", \"GLUCOSEU\", \"BLOODU\", \"KETONESU\", \"BILIRUBINU\", \"NITRITEU\", \"LEUKOCYTESU\", \"UTPCR\"     Objective:   Vitals: /63   Pulse 92   Temp 36.9 °C (98.4 °F)   Resp 20   Ht 1.575 m (5' 2.01\")   Wt 63 kg (139 lb)   SpO2 97%   BMI 25.42 kg/m²    Wt Readings from Last 3 Encounters:   08/29/24 63 kg (139 lb)   08/04/24 64.9 kg (143 lb)      24HR INTAKE/OUTPUT:    Intake/Output Summary (Last 24 hours) at 9/2/2024 0823  Last data filed at 9/1/2024 1700  Gross per 24 hour   Intake 300 ml   Output 500 ml   Net -200 ml     Admission weight:  Weight: 64.5 kg (142 lb 3.2 oz)      Constitutional:  Alert, awake, no apparent distress   Skin:normal, no rash  HEENT:sclera anicteric.  Head atraumatic normocephalic  Neck:supple with no thyromegally  Cardiovascular:  S1, S2 without m/r/g   Respiratory:  CTA B without w/r/r   Abdomen: +bs, soft, nt  Ext: no LE edema  Musculoskeletal:Intact  Neuro:Alert and oriented with no deficit      Electronically signed by Myron Canada MD on 9/2/2024 at 8:23 AM            "

## 2024-09-02 NOTE — PROGRESS NOTES
This note was created using voice recognition transcription software. Despite proofreading, unintentional typographical errors may be present. Please contact the GI office with any questions or concerns.       Subjective  Tolerating a diet well.  Is having some mid-esophageal pain when swallowing.  Very sleepy this morning.  Taking Miralax.  Last BM was about 1-2 days before admittance to the hospital.  Doesn't feel constipated.  Having LLE leg pain and states she's getting pain medication for it.          Physical Exam:  General: Polite, calm, resting  Skin:  Warm and dry, no jaundice  HEENT: No scleral icterus, no conjunctival pallor, normocephalic, atraumatic, mucous membranes moist  Neck:  atraumatic, trachea midline, no JVD  Chest:  Clear to auscultation bilaterally. No wheezes, rales, or rhonchi  CV:  Regular rate and rhythm.  Positive S1/S2  Abdomen: no distension, +BS, soft, non-tender to palpation, no rebound tenderness, no guarding, no rigidity, no discernible ascites   Extremities: no lower extremity edema, chronic pigmentary changes, no cyanosis  Neurological:  A&Ox3  Psychiatric: cooperative     Investigations:  Labs, radiological imaging and cardiac work up were reviewed        Objective:         8/31/2024     8:55 PM 9/1/2024    12:59 AM 9/1/2024     7:44 AM 9/1/2024     3:38 PM 9/1/2024     7:54 PM 9/1/2024    11:47 PM 9/2/2024     7:35 AM   Vitals   Systolic  143 136 124 123 132 132   Diastolic  65 63 59 58 61 63   Heart Rate 85 77 94 85 93 76 92   Temp  36.6 °C (97.9 °F) 36.8 °C (98.2 °F) 36.8 °C (98.2 °F) 36.9 °C (98.4 °F) 36.5 °C (97.7 °F) 36.9 °C (98.4 °F)   Resp  18 20              Medications:  aspirin, 81 mg, oral, Daily  calcium carbonate, 500 mg, oral, Daily  ceFAZolin, 1 g, intravenous, q8h  famotidine, 20 mg, oral, Daily  lactobacillus acidophilus, 1 tablet, oral, BID  lidocaine, 1 patch, transdermal, Daily  lisinopril, 20 mg, oral, Daily  metoprolol tartrate, 25 mg, oral,  q12h  polyethylene glycol, 17 g, oral, Daily  sennosides-docusate sodium, 1 tablet, oral, Nightly         Recent Results (from the past 72 hour(s))   Protime-INR    Collection Time: 08/30/24 12:52 PM   Result Value Ref Range    Protime 48.4 (H) 9.8 - 12.8 seconds    INR 4.2 (H) 0.9 - 1.1   TSH    Collection Time: 08/30/24 12:52 PM   Result Value Ref Range    Thyroid Stimulating Hormone 1.51 0.44 - 3.98 mIU/L   Lavender Top    Collection Time: 08/30/24 12:52 PM   Result Value Ref Range    Extra Tube Hold for add-ons.    SST TOP    Collection Time: 08/30/24 12:52 PM   Result Value Ref Range    Extra Tube Hold for add-ons.    CBC    Collection Time: 08/31/24  4:01 AM   Result Value Ref Range    WBC 10.9 4.4 - 11.3 x10*3/uL    nRBC 0.0 0.0 - 0.0 /100 WBCs    RBC 3.23 (L) 4.00 - 5.20 x10*6/uL    Hemoglobin 9.6 (L) 12.0 - 16.0 g/dL    Hematocrit 28.1 (L) 36.0 - 46.0 %    MCV 87 80 - 100 fL    MCH 29.7 26.0 - 34.0 pg    MCHC 34.2 32.0 - 36.0 g/dL    RDW 12.5 11.5 - 14.5 %    Platelets 345 150 - 450 x10*3/uL   Basic metabolic panel    Collection Time: 08/31/24  4:01 AM   Result Value Ref Range    Glucose 109 (H) 74 - 99 mg/dL    Sodium 123 (L) 136 - 145 mmol/L    Potassium 4.3 3.5 - 5.3 mmol/L    Chloride 95 (L) 98 - 107 mmol/L    Bicarbonate 23 21 - 32 mmol/L    Anion Gap 9 (L) 10 - 20 mmol/L    Urea Nitrogen 21 6 - 23 mg/dL    Creatinine 0.85 0.50 - 1.05 mg/dL    eGFR 65 >60 mL/min/1.73m*2    Calcium 8.7 8.6 - 10.3 mg/dL   Magnesium    Collection Time: 08/31/24  4:01 AM   Result Value Ref Range    Magnesium 1.40 (L) 1.60 - 2.40 mg/dL   ECG 12 lead    Collection Time: 08/31/24  8:55 AM   Result Value Ref Range    Ventricular Rate 84 BPM    Atrial Rate 84 BPM    ID Interval 212 ms    QRS Duration 104 ms    QT Interval 366 ms    QTC Calculation(Bazett) 432 ms    P Axis 25 degrees    R Axis -25 degrees    T Axis -16 degrees    QRS Count 14 beats    Q Onset 221 ms    P Onset 115 ms    P Offset 165 ms    T Offset 404 ms    QTC  Sonido 409 ms   Troponin I, High Sensitivity    Collection Time: 08/31/24  9:22 AM   Result Value Ref Range    Troponin I, High Sensitivity 674 (HH) 0 - 13 ng/L   Protime-INR    Collection Time: 08/31/24  9:22 AM   Result Value Ref Range    Protime 35.3 (H) 9.8 - 12.8 seconds    INR 3.1 (H) 0.9 - 1.1   Creatine Kinase    Collection Time: 08/31/24  9:22 AM   Result Value Ref Range    Creatine Kinase 54 0 - 215 U/L   Basic Metabolic Panel    Collection Time: 08/31/24  8:06 PM   Result Value Ref Range    Glucose 101 (H) 74 - 99 mg/dL    Sodium 123 (L) 136 - 145 mmol/L    Potassium 4.4 3.5 - 5.3 mmol/L    Chloride 95 (L) 98 - 107 mmol/L    Bicarbonate 20 (L) 21 - 32 mmol/L    Anion Gap 12 10 - 20 mmol/L    Urea Nitrogen 18 6 - 23 mg/dL    Creatinine 0.86 0.50 - 1.05 mg/dL    eGFR 64 >60 mL/min/1.73m*2    Calcium 8.6 8.6 - 10.3 mg/dL   CBC    Collection Time: 09/01/24  4:10 AM   Result Value Ref Range    WBC 8.9 4.4 - 11.3 x10*3/uL    nRBC 0.0 0.0 - 0.0 /100 WBCs    RBC 3.11 (L) 4.00 - 5.20 x10*6/uL    Hemoglobin 9.1 (L) 12.0 - 16.0 g/dL    Hematocrit 27.2 (L) 36.0 - 46.0 %    MCV 88 80 - 100 fL    MCH 29.3 26.0 - 34.0 pg    MCHC 33.5 32.0 - 36.0 g/dL    RDW 12.6 11.5 - 14.5 %    Platelets 340 150 - 450 x10*3/uL   Basic metabolic panel    Collection Time: 09/01/24  4:10 AM   Result Value Ref Range    Glucose 94 74 - 99 mg/dL    Sodium 125 (L) 136 - 145 mmol/L    Potassium 4.2 3.5 - 5.3 mmol/L    Chloride 95 (L) 98 - 107 mmol/L    Bicarbonate 24 21 - 32 mmol/L    Anion Gap 10 10 - 20 mmol/L    Urea Nitrogen 17 6 - 23 mg/dL    Creatinine 0.80 0.50 - 1.05 mg/dL    eGFR 70 >60 mL/min/1.73m*2    Calcium 8.6 8.6 - 10.3 mg/dL   Magnesium    Collection Time: 09/01/24  4:10 AM   Result Value Ref Range    Magnesium 1.72 1.60 - 2.40 mg/dL   Osmolality    Collection Time: 09/01/24  4:10 AM   Result Value Ref Range    Osmolality, Serum 265 (L) 280 - 300 mOsm/kg   SST TOP    Collection Time: 09/01/24  4:10 AM   Result Value Ref  Range    Extra Tube Hold for add-ons.    Troponin I, High Sensitivity    Collection Time: 09/01/24  4:10 AM   Result Value Ref Range    Troponin I, High Sensitivity 602 (HH) 0 - 13 ng/L   Protime-INR    Collection Time: 09/01/24  7:53 AM   Result Value Ref Range    Protime 19.6 (H) 9.8 - 12.8 seconds    INR 1.7 (H) 0.9 - 1.1   Protime-INR    Collection Time: 09/01/24  1:11 PM   Result Value Ref Range    Protime 18.2 (H) 9.8 - 12.8 seconds    INR 1.6 (H) 0.9 - 1.1   Basic Metabolic Panel    Collection Time: 09/01/24  6:34 PM   Result Value Ref Range    Glucose 120 (H) 74 - 99 mg/dL    Sodium 125 (L) 136 - 145 mmol/L    Potassium 4.4 3.5 - 5.3 mmol/L    Chloride 96 (L) 98 - 107 mmol/L    Bicarbonate 20 (L) 21 - 32 mmol/L    Anion Gap 13 10 - 20 mmol/L    Urea Nitrogen 19 6 - 23 mg/dL    Creatinine 0.97 0.50 - 1.05 mg/dL    eGFR 55 (L) >60 mL/min/1.73m*2    Calcium 8.8 8.6 - 10.3 mg/dL   CBC    Collection Time: 09/02/24  4:11 AM   Result Value Ref Range    WBC 11.1 4.4 - 11.3 x10*3/uL    nRBC 0.0 0.0 - 0.0 /100 WBCs    RBC 3.08 (L) 4.00 - 5.20 x10*6/uL    Hemoglobin 9.1 (L) 12.0 - 16.0 g/dL    Hematocrit 27.2 (L) 36.0 - 46.0 %    MCV 88 80 - 100 fL    MCH 29.5 26.0 - 34.0 pg    MCHC 33.5 32.0 - 36.0 g/dL    RDW 12.7 11.5 - 14.5 %    Platelets 350 150 - 450 x10*3/uL   Basic metabolic panel    Collection Time: 09/02/24  4:11 AM   Result Value Ref Range    Glucose 97 74 - 99 mg/dL    Sodium 130 (L) 136 - 145 mmol/L    Potassium 4.1 3.5 - 5.3 mmol/L    Chloride 99 98 - 107 mmol/L    Bicarbonate 24 21 - 32 mmol/L    Anion Gap 11 10 - 20 mmol/L    Urea Nitrogen 18 6 - 23 mg/dL    Creatinine 0.86 0.50 - 1.05 mg/dL    eGFR 64 >60 mL/min/1.73m*2    Calcium 8.8 8.6 - 10.3 mg/dL   Troponin I, High Sensitivity    Collection Time: 09/02/24  4:11 AM   Result Value Ref Range    Troponin I, High Sensitivity 211 (HH) 0 - 13 ng/L   Magnesium    Collection Time: 09/02/24  4:11 AM   Result Value Ref Range    Magnesium 1.82 1.60 - 2.40  mg/dL   Uric Acid    Collection Time: 09/02/24  4:11 AM   Result Value Ref Range    Uric Acid 5.5 2.3 - 6.7 mg/dL   TSH    Collection Time: 09/02/24  4:11 AM   Result Value Ref Range    Thyroid Stimulating Hormone 2.39 0.44 - 3.98 mIU/L   ECG 12 Lead    Collection Time: 09/02/24  6:15 AM   Result Value Ref Range    Ventricular Rate 82 BPM    Atrial Rate 82 BPM    CO Interval 212 ms    QRS Duration 106 ms    QT Interval 368 ms    QTC Calculation(Bazett) 429 ms    P Axis 50 degrees    R Axis -25 degrees    T Axis -5 degrees    QRS Count 14 beats    Q Onset 214 ms    P Onset 108 ms    P Offset 165 ms    T Offset 398 ms    QTC Fredericia 408 ms          Assessment:  This is a 89 yo Female with a PMH of DCIS with mastectomy, , GERD, prior DVT (on coumadin), COVID PNA, osteoarthrosis, umbilical hernia, HTN, 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  GI was consulted for odynophagia. She had a hematoma in her L knee x 3 weeks that was getting better than worse. Had a high troponin and INR was 3.1 upon consultation.  Currently has cellulitis and ID on consults.  Still having pain in mid-esophagus when eating.  No nausea/vomiting.          Plan  1.)  Odynophagia-Esophagram pending and can consider EGD dependent on results.  Continue Famotidine d/t prior rashes with PPI's.      Discussed patient with Dr. Sapp.    I spent 35 minutes in the professional and overall care of this patient.      09/02/24 at 10:02 AM - JORGE Hansen-CNP

## 2024-09-03 ENCOUNTER — APPOINTMENT (OUTPATIENT)
Dept: RADIOLOGY | Facility: HOSPITAL | Age: 89
DRG: 871 | End: 2024-09-03
Payer: MEDICARE

## 2024-09-03 ENCOUNTER — APPOINTMENT (OUTPATIENT)
Dept: CARDIOLOGY | Facility: HOSPITAL | Age: 89
DRG: 871 | End: 2024-09-03
Payer: MEDICARE

## 2024-09-03 PROBLEM — I21.4 NSTEMI (NON-ST ELEVATED MYOCARDIAL INFARCTION) (MULTI): Status: ACTIVE | Noted: 2024-08-29

## 2024-09-03 LAB
ANION GAP SERPL CALC-SCNC: 12 MMOL/L (ref 10–20)
BUN SERPL-MCNC: 19 MG/DL (ref 6–23)
CALCIUM SERPL-MCNC: 8.5 MG/DL (ref 8.6–10.3)
CHLORIDE SERPL-SCNC: 98 MMOL/L (ref 98–107)
CHOLEST SERPL-MCNC: 131 MG/DL (ref 0–199)
CHOLESTEROL/HDL RATIO: 3
CO2 SERPL-SCNC: 23 MMOL/L (ref 21–32)
CREAT SERPL-MCNC: 0.95 MG/DL (ref 0.5–1.05)
CREAT UR-MCNC: 67.5 MG/DL (ref 20–320)
EGFRCR SERPLBLD CKD-EPI 2021: 57 ML/MIN/1.73M*2
ERYTHROCYTE [DISTWIDTH] IN BLOOD BY AUTOMATED COUNT: 12.6 % (ref 11.5–14.5)
GLUCOSE SERPL-MCNC: 97 MG/DL (ref 74–99)
HCT VFR BLD AUTO: 26.3 % (ref 36–46)
HDLC SERPL-MCNC: 43.6 MG/DL
HGB BLD-MCNC: 8.9 G/DL (ref 12–16)
INR PPP: 1.3 (ref 0.9–1.1)
LDLC SERPL CALC-MCNC: 71 MG/DL
MAGNESIUM SERPL-MCNC: 1.75 MG/DL (ref 1.6–2.4)
MCH RBC QN AUTO: 30 PG (ref 26–34)
MCHC RBC AUTO-ENTMCNC: 33.8 G/DL (ref 32–36)
MCV RBC AUTO: 89 FL (ref 80–100)
NON HDL CHOLESTEROL: 87 MG/DL (ref 0–149)
NRBC BLD-RTO: 0 /100 WBCS (ref 0–0)
OSMOLALITY UR: 407 MOSM/KG (ref 200–1200)
PLATELET # BLD AUTO: 351 X10*3/UL (ref 150–450)
POTASSIUM SERPL-SCNC: 4.1 MMOL/L (ref 3.5–5.3)
PROTHROMBIN TIME: 15.1 SECONDS (ref 9.8–12.8)
RBC # BLD AUTO: 2.97 X10*6/UL (ref 4–5.2)
SODIUM SERPL-SCNC: 129 MMOL/L (ref 136–145)
SODIUM UR-SCNC: 90 MMOL/L
SODIUM/CREAT UR-RTO: 133 MMOL/G CREAT
TRIGL SERPL-MCNC: 82 MG/DL (ref 0–149)
VLDL: 16 MG/DL (ref 0–40)
WBC # BLD AUTO: 9.1 X10*3/UL (ref 4.4–11.3)

## 2024-09-03 PROCEDURE — 2500000005 HC RX 250 GENERAL PHARMACY W/O HCPCS: Performed by: STUDENT IN AN ORGANIZED HEALTH CARE EDUCATION/TRAINING PROGRAM

## 2024-09-03 PROCEDURE — 2720000007 HC OR 272 NO HCPCS: Performed by: INTERNAL MEDICINE

## 2024-09-03 PROCEDURE — 2500000004 HC RX 250 GENERAL PHARMACY W/ HCPCS (ALT 636 FOR OP/ED): Performed by: INTERNAL MEDICINE

## 2024-09-03 PROCEDURE — 2780000003 HC OR 278 NO HCPCS: Performed by: INTERNAL MEDICINE

## 2024-09-03 PROCEDURE — 2500000001 HC RX 250 WO HCPCS SELF ADMINISTERED DRUGS (ALT 637 FOR MEDICARE OP): Performed by: HOSPITALIST

## 2024-09-03 PROCEDURE — 97530 THERAPEUTIC ACTIVITIES: CPT | Mod: GP,CQ

## 2024-09-03 PROCEDURE — 97535 SELF CARE MNGMENT TRAINING: CPT | Mod: GO,CO

## 2024-09-03 PROCEDURE — 99232 SBSQ HOSP IP/OBS MODERATE 35: CPT

## 2024-09-03 PROCEDURE — 83735 ASSAY OF MAGNESIUM: CPT | Performed by: HOSPITALIST

## 2024-09-03 PROCEDURE — 80061 LIPID PANEL: CPT | Performed by: NURSE PRACTITIONER

## 2024-09-03 PROCEDURE — 93005 ELECTROCARDIOGRAM TRACING: CPT

## 2024-09-03 PROCEDURE — 2500000004 HC RX 250 GENERAL PHARMACY W/ HCPCS (ALT 636 FOR OP/ED): Performed by: STUDENT IN AN ORGANIZED HEALTH CARE EDUCATION/TRAINING PROGRAM

## 2024-09-03 PROCEDURE — 99024 POSTOP FOLLOW-UP VISIT: CPT | Performed by: NURSE PRACTITIONER

## 2024-09-03 PROCEDURE — 7100000001 HC RECOVERY ROOM TIME - INITIAL BASE CHARGE: Performed by: INTERNAL MEDICINE

## 2024-09-03 PROCEDURE — 93458 L HRT ARTERY/VENTRICLE ANGIO: CPT | Performed by: INTERNAL MEDICINE

## 2024-09-03 PROCEDURE — 99232 SBSQ HOSP IP/OBS MODERATE 35: CPT | Performed by: STUDENT IN AN ORGANIZED HEALTH CARE EDUCATION/TRAINING PROGRAM

## 2024-09-03 PROCEDURE — 85610 PROTHROMBIN TIME: CPT | Performed by: INTERNAL MEDICINE

## 2024-09-03 PROCEDURE — B211YZZ FLUOROSCOPY OF MULTIPLE CORONARY ARTERIES USING OTHER CONTRAST: ICD-10-PCS | Performed by: INTERNAL MEDICINE

## 2024-09-03 PROCEDURE — 2500000004 HC RX 250 GENERAL PHARMACY W/ HCPCS (ALT 636 FOR OP/ED): Mod: JZ | Performed by: INTERNAL MEDICINE

## 2024-09-03 PROCEDURE — 99153 MOD SED SAME PHYS/QHP EA: CPT | Performed by: INTERNAL MEDICINE

## 2024-09-03 PROCEDURE — 2550000001 HC RX 255 CONTRASTS: Performed by: INTERNAL MEDICINE

## 2024-09-03 PROCEDURE — 99152 MOD SED SAME PHYS/QHP 5/>YRS: CPT | Performed by: INTERNAL MEDICINE

## 2024-09-03 PROCEDURE — 2500000005 HC RX 250 GENERAL PHARMACY W/O HCPCS: Performed by: HOSPITALIST

## 2024-09-03 PROCEDURE — C1760 CLOSURE DEV, VASC: HCPCS | Performed by: INTERNAL MEDICINE

## 2024-09-03 PROCEDURE — A9698 NON-RAD CONTRAST MATERIALNOC: HCPCS | Performed by: STUDENT IN AN ORGANIZED HEALTH CARE EDUCATION/TRAINING PROGRAM

## 2024-09-03 PROCEDURE — G0269 OCCLUSIVE DEVICE IN VEIN ART: HCPCS | Mod: 59 | Performed by: INTERNAL MEDICINE

## 2024-09-03 PROCEDURE — 4A023N7 MEASUREMENT OF CARDIAC SAMPLING AND PRESSURE, LEFT HEART, PERCUTANEOUS APPROACH: ICD-10-PCS | Performed by: INTERNAL MEDICINE

## 2024-09-03 PROCEDURE — 1200000002 HC GENERAL ROOM WITH TELEMETRY DAILY

## 2024-09-03 PROCEDURE — 83935 ASSAY OF URINE OSMOLALITY: CPT | Mod: ELYLAB | Performed by: INTERNAL MEDICINE

## 2024-09-03 PROCEDURE — 99232 SBSQ HOSP IP/OBS MODERATE 35: CPT | Performed by: NURSE PRACTITIONER

## 2024-09-03 PROCEDURE — 2500000001 HC RX 250 WO HCPCS SELF ADMINISTERED DRUGS (ALT 637 FOR MEDICARE OP): Performed by: NURSE PRACTITIONER

## 2024-09-03 PROCEDURE — 7100000009 HC PHASE TWO TIME - INITIAL BASE CHARGE: Performed by: INTERNAL MEDICINE

## 2024-09-03 PROCEDURE — 82570 ASSAY OF URINE CREATININE: CPT | Performed by: INTERNAL MEDICINE

## 2024-09-03 PROCEDURE — 7100000010 HC PHASE TWO TIME - EACH INCREMENTAL 1 MINUTE: Performed by: INTERNAL MEDICINE

## 2024-09-03 PROCEDURE — 74220 X-RAY XM ESOPHAGUS 1CNTRST: CPT

## 2024-09-03 PROCEDURE — B215YZZ FLUOROSCOPY OF LEFT HEART USING OTHER CONTRAST: ICD-10-PCS | Performed by: INTERNAL MEDICINE

## 2024-09-03 PROCEDURE — 7100000002 HC RECOVERY ROOM TIME - EACH INCREMENTAL 1 MINUTE: Performed by: INTERNAL MEDICINE

## 2024-09-03 PROCEDURE — 36415 COLL VENOUS BLD VENIPUNCTURE: CPT | Performed by: STUDENT IN AN ORGANIZED HEALTH CARE EDUCATION/TRAINING PROGRAM

## 2024-09-03 PROCEDURE — 74220 X-RAY XM ESOPHAGUS 1CNTRST: CPT | Performed by: RADIOLOGY

## 2024-09-03 PROCEDURE — 82374 ASSAY BLOOD CARBON DIOXIDE: CPT | Performed by: STUDENT IN AN ORGANIZED HEALTH CARE EDUCATION/TRAINING PROGRAM

## 2024-09-03 PROCEDURE — 2500000005 HC RX 250 GENERAL PHARMACY W/O HCPCS: Performed by: INTERNAL MEDICINE

## 2024-09-03 PROCEDURE — 85027 COMPLETE CBC AUTOMATED: CPT | Performed by: STUDENT IN AN ORGANIZED HEALTH CARE EDUCATION/TRAINING PROGRAM

## 2024-09-03 RX ORDER — FENTANYL CITRATE 50 UG/ML
INJECTION, SOLUTION INTRAMUSCULAR; INTRAVENOUS AS NEEDED
Status: DISCONTINUED | OUTPATIENT
Start: 2024-09-03 | End: 2024-09-03 | Stop reason: HOSPADM

## 2024-09-03 RX ORDER — MIDAZOLAM HYDROCHLORIDE 1 MG/ML
INJECTION, SOLUTION INTRAMUSCULAR; INTRAVENOUS AS NEEDED
Status: DISCONTINUED | OUTPATIENT
Start: 2024-09-03 | End: 2024-09-03 | Stop reason: HOSPADM

## 2024-09-03 RX ORDER — LIDOCAINE HYDROCHLORIDE 20 MG/ML
INJECTION, SOLUTION INFILTRATION; PERINEURAL AS NEEDED
Status: DISCONTINUED | OUTPATIENT
Start: 2024-09-03 | End: 2024-09-03 | Stop reason: HOSPADM

## 2024-09-03 RX ORDER — NITROGLYCERIN 5 MG/ML
INJECTION, SOLUTION INTRAVENOUS AS NEEDED
Status: DISCONTINUED | OUTPATIENT
Start: 2024-09-03 | End: 2024-09-03 | Stop reason: HOSPADM

## 2024-09-03 RX ORDER — SODIUM CHLORIDE 9 MG/ML
100 INJECTION, SOLUTION INTRAVENOUS CONTINUOUS
Status: ACTIVE | OUTPATIENT
Start: 2024-09-03 | End: 2024-09-03

## 2024-09-03 ASSESSMENT — COGNITIVE AND FUNCTIONAL STATUS - GENERAL
DAILY ACTIVITIY SCORE: 19
WALKING IN HOSPITAL ROOM: A LOT
MOBILITY SCORE: 16
DRESSING REGULAR UPPER BODY CLOTHING: A LITTLE
DRESSING REGULAR LOWER BODY CLOTHING: A LITTLE
HELP NEEDED FOR BATHING: A LOT
PERSONAL GROOMING: A LITTLE
HELP NEEDED FOR BATHING: A LITTLE
DRESSING REGULAR UPPER BODY CLOTHING: A LITTLE
CLIMB 3 TO 5 STEPS WITH RAILING: TOTAL
TURNING FROM BACK TO SIDE WHILE IN FLAT BAD: A LOT
TOILETING: A LITTLE
MOVING FROM LYING ON BACK TO SITTING ON SIDE OF FLAT BED WITH BEDRAILS: A LOT
MOVING TO AND FROM BED TO CHAIR: A LITTLE
TURNING FROM BACK TO SIDE WHILE IN FLAT BAD: A LITTLE
TOILETING: A LOT
WALKING IN HOSPITAL ROOM: A LOT
PERSONAL GROOMING: A LITTLE
MOBILITY SCORE: 11
MOVING TO AND FROM BED TO CHAIR: A LOT
CLIMB 3 TO 5 STEPS WITH RAILING: TOTAL
EATING MEALS: A LITTLE
STANDING UP FROM CHAIR USING ARMS: A LOT
DRESSING REGULAR LOWER BODY CLOTHING: A LOT
STANDING UP FROM CHAIR USING ARMS: A LITTLE
DAILY ACTIVITIY SCORE: 15

## 2024-09-03 ASSESSMENT — PAIN SCALES - GENERAL
PAINLEVEL_OUTOF10: 0 - NO PAIN
PAINLEVEL_OUTOF10: 0 - NO PAIN
PAINLEVEL_OUTOF10: 6
PAINLEVEL_OUTOF10: 6
PAINLEVEL_OUTOF10: 0 - NO PAIN
PAINLEVEL_OUTOF10: 6
PAINLEVEL_OUTOF10: 0 - NO PAIN

## 2024-09-03 ASSESSMENT — PAIN DESCRIPTION - LOCATION: LOCATION: KNEE

## 2024-09-03 ASSESSMENT — PAIN DESCRIPTION - ORIENTATION: ORIENTATION: LEFT

## 2024-09-03 ASSESSMENT — ACTIVITIES OF DAILY LIVING (ADL): HOME_MANAGEMENT_TIME_ENTRY: 15

## 2024-09-03 NOTE — PROGRESS NOTES
"Renal Progress Note    Assessment and Plan:    89 y.o. yo female admitted with cellulitis.  Has normal EF but has some edema.  Tried on lasix in July but na went to 123 and high k.  Says she isn't taking lasix anymore.  Used to be on hydrochlorothiazide but this was dcd 11/2023.  Has edema and pleural effusions and na in low to mid 120s           Plan/  Ordered urine na and osmolality on 8/31.  Wasn't sent.  Read active  [facility]  Check uric acid, tsh, am cortisol, serum osmolality - ordered as add on tests to am labs  1200 fluid restrict for now if sodium drop will start urea  On discharge would just send out off lasix (she says she has been off it)  Has seen [facility] nephrology before  Outpatient follow up from renal standpoint: His/her regular nephrologist    Subjective:   Admit Date: 8/29/2024    Interval History: Patient seen and examined uneventful night no uremic related or fluid volume overload related symptoms alert follow command in no apparent pain or distress pleasant      Medications:   Scheduled Meds:aspirin, 81 mg, oral, Daily  calcium carbonate, 500 mg, oral, Daily  ceFAZolin, 1 g, intravenous, q8h  famotidine, 20 mg, oral, Daily  lactobacillus acidophilus, 1 tablet, oral, BID  lidocaine, 1 patch, transdermal, Daily  lisinopril, 20 mg, oral, Daily  metoprolol tartrate, 25 mg, oral, q12h  polyethylene glycol, 17 g, oral, Daily  sennosides-docusate sodium, 1 tablet, oral, Nightly      Continuous Infusions:     CBC:   Lab Results   Component Value Date    HGB 8.9 (L) 09/03/2024    HGB 9.1 (L) 09/02/2024    WBC 9.1 09/03/2024    WBC 11.1 09/02/2024     09/03/2024     09/02/2024      Anemia:  No results found for: \"FERRITIN\", \"IRON\", \"TIBC\"   BMP:    Lab Results   Component Value Date     (L) 09/03/2024     (L) 09/02/2024    K 4.1 09/03/2024    K 4.1 09/02/2024    CL 98 09/03/2024    CL 99 09/02/2024    CO2 23 09/03/2024    CO2 " "24 09/02/2024    BUN 19 09/03/2024    BUN 18 09/02/2024    CREATININE 0.95 09/03/2024    CREATININE 0.86 09/02/2024      Bone disease: No results found for: \"PHOS\", \"PTH\", \"VITD25\"   Urinalysis:  No results found for: \"MONET\", \"PROTUR\", \"GLUCOSEU\", \"BLOODU\", \"KETONESU\", \"BILIRUBINU\", \"NITRITEU\", \"LEUKOCYTESU\", \"UTPCR\"     Objective:   Vitals: /55 (BP Location: Right arm, Patient Position: Lying)   Pulse 95   Temp 36.8 °C (98.2 °F) (Temporal)   Resp 18   Ht 1.575 m (5' 2.01\")   Wt 63 kg (139 lb)   SpO2 93%   BMI 25.42 kg/m²    Wt Readings from Last 3 Encounters:   08/29/24 63 kg (139 lb)   08/04/24 64.9 kg (143 lb)      24HR INTAKE/OUTPUT:    Intake/Output Summary (Last 24 hours) at 9/3/2024 1242  Last data filed at 9/3/2024 0957  Gross per 24 hour   Intake 550 ml   Output 400 ml   Net 150 ml     Admission weight:  Weight: 64.5 kg (142 lb 3.2 oz)      Constitutional:  Alert, awake, no apparent distress   Skin:normal, no rash  HEENT:sclera anicteric.  Head atraumatic normocephalic  Neck:supple with no thyromegally  Cardiovascular:  S1, S2 without m/r/g   Respiratory:  CTA B without w/r/r   Abdomen: +bs, soft, nt  Ext: no LE edema  Musculoskeletal:Intact  Neuro:Alert and oriented with no deficit      Electronically signed by Wang Young MD on 9/3/2024 at 12:42 PM            "

## 2024-09-03 NOTE — DISCHARGE INSTRUCTIONS
CARDIAC CATHETERIZATION DISCHARGE INSTRUCTIONS     FOR SUDDEN AND SEVERE CHEST PAIN, SHORTNESS OF BREATH, EXCESSIVE BLEEDING, SIGNS OF STROKE, OR CHANGES IN MENTAL STATUS YOU SHOULD CALL 911 IMMEDIATELY.     FOR NEXT 24 HOURS  - Upon discharge, you should return home and rest for the remainder of the day and evening. You do not have to stay on bed rest but should not be very active.  It is recommended a responsible adult be with you for the first 24 hours after the procedure.    - No driving for 24 hours after procedure. Please arrange for someone to drive you home from the hospital today.     - Do not drive, operate machinery, or use power tools for 24 hours after your procedure.     - Do not make any legal decisions for 24 hours after your procedure.     - Do not drink alcoholic beverages for 24 hours after your procedure.    WOUND CARE   *FOR FEMORAL (LEG) ACCESS*  ·      Avoid heavy lifting (over 10 pounds) for 3 days, squatting or excessive bending for 2 days, and strenuous exercise for 7 days.  ·      No submerged bathing, swimming, or hot tubs for the next 7 days, or until fully healed.  ·      Avoid sexual activity for 3-4 days until any groin discomfort has ceased.      - The transparent dressing should be removed from the site 24 hours after the procedure.  Wash the site gently with soap and water. Rinse well and pat dry. Keep the area clean and dry. You may apply a Band-Aid to the site. Avoid lotions, ointments, or powders until fully healed.     - You may shower the day after your procedure.      - It is normal to notice a small bruise around the puncture site and/or a small grape sized or smaller lump. Any large bruising or large lump warrants a call to the office.     - If bleeding should occur, lay down and apply pressure to the affected area for 10 minutes.  If the bleeding stops notify your physician.  If there is a large amount of bleeding or spurting of blood CALL 911 immediately.  DO NOT  drive yourself to the hospital.    - You may experience some tenderness, bruising or minimal inflammation.  If you have any concerns, you may contact the Cath Lab or if any of these symptoms become excessive, contact your cardiologist or go to the emergency room.     OTHER INSTRUCTIONS  - You may take acetaminophen (Tylenol) as directed for discomfort.  If pain is not relieved with acetaminophen (Tylenol), contact your doctor.    - If you notice or experience any of the following, you should notify your doctor or seek medical attention  Chest pain or discomfort  Change in mental status or weakness in extremities.  Dizziness, light headedness, or feeling faint.  Change in the site where the procedure was performed, such as bleeding or an increased area of bruising or swelling.  Tingling, numbness, pain, or coolness in the leg/arm beyond the site where the procedure was performed.  Signs of infection (i.e. shaking chills, temperature > 100 degrees Fahrenheit, warmth, redness) in the leg/arm area where the procedure was performed.  Changes in urination   Bloody or black stools  Vomiting blood  Severe nose bleeds  Any excessive bleeding    - If you DO NOT have an appointment with your cardiologist within 2-4 weeks following your procedure, please contact their office.

## 2024-09-03 NOTE — POST-PROCEDURE NOTE
Physician Transition of Care Summary  Invasive Cardiovascular Lab    Procedure Date: 9/3/2024  Attending:    * Wicho Pizano - Primary  Resident/Fellow/Other Assistant: Surgeons and Role:  * No surgeons found with a matching role *    Pre Procedure Diagnosis:   NSTEMI    Post Procedure Diagnosis:   Two-vessel CAD, normal left ventricular systolic function, medical management    Complications:   None    Stents/Implants:   None    Anticoagulation/Antiplatelet Plan:   Aspirin 81 mg a day    Estimated Blood Loss:   0 mL    Electronically signed by: Wicho Pizano MD, 9/3/2024 2:55 PM    Anesthesia: Moderate                            anesthesia Staff: None

## 2024-09-03 NOTE — PROGRESS NOTES
PROGRESS NOTE    ASSESSMENT AND PLAN:     #. Troponin elevation  #. Chest pain  -ECG shows no acute ischemic changes  -Troponin was elevated at 667, trending down.  -CT angio showed negative for pulmonary embolism, shows pleural effusion  -S/p LHC on 9/3. 95% stenosis in the mid LAD with MARIANO I flow and collaterals noted, 50% stenosis in the diagonal artery, 50% stenosis in the dominant mid RCA and an LVEF of 65%.     Plan:  -Continue aspirin  -Cards following, recs appreciated. Continued medical management advised post [facility].   -Echocardiogram pending. Preserved EF noted on LHC    #.  Sepsis secondary to left lower extremity cellulitis  -Presented with leukocytosis and tachycardia, diffuse erythema of left lower extremity without drainage  -Seen by infectious disease, continue Ancef, can change to Keflex on discharge.  -Cellulitis, overall improving     #.  Left knee pain 2/2 hemarthrosis of left knee  -Likely induced by trauma while on Coumadin, Doppler negative for DVT  -CT scan shows a large left knee hemarthrosis not significantly changed from 8/5  -Orthopedic surgery consult, attempted an aspiration but were unable to aspirate.  -Overall improving     #. Gastroesophageal reflux disease  -Continues to have chest pain with eating.  -GI following, recs appreciated. Plan for outpatient EGD. Advise continued supportive care & famotidine 20 mg daily  -S/p esophogram which was negative for mass/mucosal abnormality but did note slowed clearance through distal thoracic esophagus.   -Patient is allergic to PPIs, continue famotidine and Tums as needed.    #.  Acute hypoxic respiratory failure  #.  Pleural effusion  #.  Hyponatremia  -Status post 1 dose of tolvaptan  -Serum sodium improved. 125 -> 130 -> 129. Will continue to monitor      #.  History of DVT in 2021  #.  Long-term anticoagulation  -Doppler negative for DVT  -Supratherapeutic INR on admission.  -It appears that her DVT was provoked in 2021 when she had  "COVID-19.  Family will check with her doctor to see if she has any hypercoagulable disease.  If not patient can be discontinued off anticoagulation given that this is her first provoked DVT, and she has a history of multiple falls.      Disposition: Once patient is cleared from a cardiac and GI standpoint, can be discharged to skilled nursing facility.    SUBJECTIVE:   Admit Date: 8/29/2024    Patient had no acute events overnight.     OBJECTIVE:   Vitals: /61   Pulse 82   Temp 36.8 °C (98.2 °F) (Temporal)   Resp 15   Ht 1.575 m (5' 2.01\")   Wt 63 kg (139 lb)   SpO2 92%   BMI 25.42 kg/m²    Wt Readings from Last 3 Encounters:   08/29/24 63 kg (139 lb)   08/04/24 64.9 kg (143 lb)      24HR INTAKE/OUTPUT:    Intake/Output Summary (Last 24 hours) at 9/3/2024 1644  Last data filed at 9/3/2024 1427  Gross per 24 hour   Intake 550 ml   Output 401 ml   Net 149 ml       PHYSICAL EXAM:   GENERAL: Laying in bed, does not appear to be in any distress.   HEENT: HEAD: Normocephalic atraumatic.  Neck: Supple.  Eyes: Pupils are reactive to direct light.   CVS: S1, S2 heard. Regular rate and rhythm  LUNGS: Clear to auscultate bilaterally. No wheezing or rhonchi appreciated.  ABDOMEN: Soft, nontender to palpate. Positive bowel sounds. No guarding or rebound appreciated.  NEUROLOGICAL: No focal neurological deficits appreciated. Cranial nerves are grossly intact.  EXTREMITIES:  Left knee edema, tenderness to palpate. Ecchymosis appreciated over left inner thigh.   SKIN:  Grossly intact, warm and dry.      LABS/IMAGING AND MEDICATIONS:   Scheduled Meds:aspirin, 81 mg, oral, Daily  calcium carbonate, 500 mg, oral, Daily  ceFAZolin, 1 g, intravenous, q8h  famotidine, 20 mg, oral, Daily  lactobacillus acidophilus, 1 tablet, oral, BID  lidocaine, 1 patch, transdermal, Daily  lisinopril, 20 mg, oral, Daily  metoprolol tartrate, 25 mg, oral, q12h  polyethylene glycol, 17 g, oral, Daily  sennosides-docusate sodium, 1 tablet, " "oral, Nightly      PRN Meds:PRN medications: acetaminophen **OR** acetaminophen **OR** acetaminophen, benzonatate, calcium carbonate, guaiFENesin, HYDROmorphone, melatonin, ondansetron **OR** ondansetron, oxyCODONE, oxyCODONE    No lab exists for component: \"CBC\"   No lab exists for component: \"CMP\"   No lab exists for component: \"TROPONIN\"      Results from last 7 days   Lab Units 09/03/24  0353   INR  1.3*     No lab exists for component: \"LIPIDS\"       No lab exists for component: \"URINALYSIS\"          BMP:  Results from last 7 days   Lab Units 09/03/24  0353 09/02/24  0411 09/01/24  1834   SODIUM mmol/L 129* 130* 125*   POTASSIUM mmol/L 4.1 4.1 4.4   CHLORIDE mmol/L 98 99 96*   CO2 mmol/L 23 24 20*   BUN mg/dL 19 18 19   CREATININE mg/dL 0.95 0.86 0.97       CBC:  Results from last 7 days   Lab Units 09/03/24  0353 09/02/24  0411 09/01/24  0410   WBC AUTO x10*3/uL 9.1 11.1 8.9   RBC AUTO x10*6/uL 2.97* 3.08* 3.11*   HEMOGLOBIN g/dL 8.9* 9.1* 9.1*   HEMATOCRIT % 26.3* 27.2* 27.2*   MCV fL 89 88 88   MCH pg 30.0 29.5 29.3   MCHC g/dL 33.8 33.5 33.5   RDW % 12.6 12.7 12.6   PLATELETS AUTO x10*3/uL 351 350 340       Cardiac Enzymes:   Results from last 7 days   Lab Units 09/02/24  0411 09/01/24  0410 08/31/24  0922   TROPHS ng/L 211* 602* 674*   CK TOTAL U/L  --   --  54         Hepatic Function Panel:  Results from last 7 days   Lab Units 08/29/24  0108   ALK PHOS U/L 71   ALT U/L 8   AST U/L 22   PROTEIN TOTAL g/dL 6.8   BILIRUBIN TOTAL mg/dL 0.7       Magnesium:  Results from last 7 days   Lab Units 09/03/24  0353   MAGNESIUM mg/dL 1.75       Pro-BNP:  No results found for: \"PROBNP\"    INR:  Results from last 7 days   Lab Units 09/03/24  0353 09/01/24  1311 09/01/24  0753   PROTIME seconds 15.1* 18.2* 19.6*   INR  1.3* 1.6* 1.7*       TSH:  Lab Results   Component Value Date    TSH 2.39 09/02/2024       Lipid Profile:  Results from last 7 days   Lab Units 09/03/24  0353   TRIGLYCERIDES mg/dL 82   HDL mg/dL 43.6 " "  LDL CALC mg/dL 71       HgbA1C:        Lactate Level:  No lab exists for component: \"LACTA\"    CMP:  Results from last 7 days   Lab Units 09/03/24  0353 09/02/24  0411 09/01/24  1834 08/29/24  1208 08/29/24  0108   SODIUM mmol/L 129* 130* 125*   < > 128*   POTASSIUM mmol/L 4.1 4.1 4.4   < > 4.1   CHLORIDE mmol/L 98 99 96*   < > 96*   CO2 mmol/L 23 24 20*   < > 22   BUN mg/dL 19 18 19   < > 25*   CREATININE mg/dL 0.95 0.86 0.97   < > 1.13*   GLUCOSE mg/dL 97 97 120*   < > 112*   CALCIUM mg/dL 8.5* 8.8 8.8   < > 9.3   PROTEIN TOTAL g/dL  --   --   --   --  6.8   BILIRUBIN TOTAL mg/dL  --   --   --   --  0.7   ALK PHOS U/L  --   --   --   --  71   AST U/L  --   --   --   --  22   ALT U/L  --   --   --   --  8    < > = values in this interval not displayed.       Amylase:        Lipase:        ABG:        No lab exists for component: \"PO2\", \"PCO2\", \"HCO3\", \"BE\", \"O2SAT\"       "

## 2024-09-03 NOTE — PROGRESS NOTES
Physical Therapy    Physical Therapy Treatment    Patient Name: Sommer Farmer  MRN: [id number]  Today's Date: 9/3/2024  Time Calculation  Start Time: 0848  Stop Time: 0928  Time Calculation (min): 40 min     806/806-A    Assessment/Plan   End of Session Communication: Bedside nurse  Assessment Comment: Patient presents with good effort throughout todays session. Improved performance of transfers and functional mobility this date although HR limiting increased gait distances based on clinical judgement. Call light and all needs in reach.  End of Session Patient Position: Up in chair, Alarm on        General Visit Information:   PT  Visit  PT Received On: 09/03/24  General  Co-Treatment: OT  Co-Treatment Reason: Due to pt's significant debility for optimal safety and therapy session  Prior to Session Communication: Bedside nurse  Patient Position Received: Bed, 3 rail up, Alarm on  General Comment: Pt agreeable to therapy this date. Caregiver present throughout entire session.  Subjective     Precautions:  Precautions  UE Weight Bearing Status:  (LUE limb alert)  LE Weight Bearing Status: Weight Bearing as Tolerated (Per eval KI for confort however KI not available/in room)  Medical Precautions: Fall precautions, Other (comment) (Tachycardia)  Precautions Comment: KI donned by therapist at start of session and worn for entire session.    Vital Signs:  Vital Signs  Heart Rate:  (post  with ~1 minute recovery, post ambulation 133 with ~1.5 minutes recovery seated rest.)  Objective     Pain:  Pain Assessment  Pain Assessment: 0-10  0-10 (Numeric) Pain Score: 6  Pain Type: Acute pain  Pain Location: Knee  Pain Orientation: Left    Cognition:  Cognition  Overall Cognitive Status: Within Functional Limits  Orientation Level: Oriented X4      Treatments:        Balance/Neuromuscular Re-Education  Balance/Neuromuscular Re-Education Activity Performed: Yes  Balance/Neuromuscular Re-Education Activity 1: Pt performed  static standing balance with FWW ModA x2 due to weakness and instability which resolved with static standing. Pt tolerated ~5 minutes standing before requiring seated rest.  Bed Mobility  Bed Mobility: Yes  Bed Mobility 1  Bed Mobility 1: Supine to sitting  Level of Assistance 1: Moderate assistance, +2, Moderate verbal cues  Bed Mobility Comments 1: Pt performed supine<>EOB ModA x2 with use of bedrails to lift trunk. Pt demonstrates ability to walk BLE off edge of bed with increased time and assist required to complete. Moderare VCfor optimal sequencing.  Bed Mobility 2  Bed Mobility  2: Sitting to supine  Level of Assistance 2: Moderate assistance, +2  Bed Mobility Comments 2: Pt performed EOB<>supine ModA x2, unable to lift LLE into bed and able to provide partial assist in lowering trunk with UEs.  Ambulation/Gait Training  Ambulation/Gait Training Performed: Yes  Ambulation/Gait Training 1  Surface 1: Level tile  Device 1: Rolling walker (FWW)  Gait Support Devices: Gait belt  Assistance 1: Minimum assistance (Mil x2)  Quality of Gait 1: Diminished heel strike, Antalgic  Comments/Distance (ft) 1: Pt ambulated 4' towards recliner with FWW, Mil x2. Pt demonstrates slightly NBOS with short stride length. Fair foot clearance, diminished heel strike on RLE. VC to correct. Good AD management with directional changes.  Transfers  Transfer: Yes  Transfer 1  Transfer From 1: Stand to  Transfer to 1: Chair with arms  Technique 1: Stand pivot  Transfer Device 1: Walker, Gait belt (fww)  Transfer Level of Assistance 1: Minimum assistance, +2, Moderate verbal cues  Trials/Comments 1: Pt performed stand pivot from fww<>chair Mil x2. Pt requires moderate VC for sequencing, good eccentric control to sitting position. Good AD management and carry over of cues throughout.  Transfers 2  Transfer From 2: Bed to  Transfer to 2: Stand  Technique 2: Stand to sit, Sit to stand  Transfer Device 2: Walker, Gait belt  Transfer Level  of Assistance 2: Moderate assistance, +2, Moderate verbal cues  Trials/Comments 2: Pt performed STS from EOB<>FWW ModA x2. ModVC required for sequencing.          Outcome Measures:     [facility] Basic Mobility  Turning from your back to your side while in a flat bed without using bedrails: A lot  Moving from lying on your back to sitting on the side of a flat bed without using bedrails: A lot  Moving to and from bed to chair (including a wheelchair): A lot  Standing up from a chair using your arms (e.g. wheelchair or bedside chair): A lot  To walk in hospital room: A lot  Climbing 3-5 steps with railing: Total  Basic Mobility - Total Score: 11                                      Education Documentation  Mobility Training, taught by Cass Ellis PTA at 9/3/2024 11:49 AM.  Learner: Caregiver, Patient  Readiness: Acceptance  Method: Explanation, Demonstration  Response: Verbalizes Understanding, Needs Reinforcement    Mobility Training, taught by Cass Ellis PTA at 9/2/2024  1:51 PM.  Learner: Caregiver, Patient  Readiness: Acceptance  Method: Explanation, Demonstration  Response: Verbalizes Understanding, Needs Reinforcement    Education Comments  No comments found.           EDUCATION:  Outpatient Education  Education Comment: Education provided on benefits of therapy and improving standing tolerance, positioning, and strengthening.  Encounter Problems       Encounter Problems (Active)       Impaired mobility        Perform all bed mobility with minAx1 (Progressing)       Start:  08/30/24    Expected End:  09/13/24            Perform all transfers with ww and minAx1 (Progressing)       Start:  08/30/24    Expected End:  09/13/24            Patient will ambulate 25 ft with ww and minAx1 (Progressing)       Start:  08/30/24    Expected End:  09/13/24            Patient will perform BLE HEP with supervision to improve strength and L knee ROM  (Not Progressing)       Start:  08/30/24    Expected End:  09/13/24             Patient will tolerate sitting EOB >/= 5 min with supervision  (Not Progressing)       Start:  08/30/24    Expected End:  09/13/24               Pain - Adult

## 2024-09-03 NOTE — SIGNIFICANT EVENT
Pt back from cath lab, alert and awake, no complaints of pain, update given to 8s RN, family at bedside

## 2024-09-03 NOTE — PROGRESS NOTES
Occupational Therapy    OT Treatment    Patient Name: Sommer Farmer  MRN: [id number]  Today's Date: 9/3/2024  Time Calculation  Start Time: 0847  Stop Time: 0927  Time Calculation (min): 40 min        Assessment:  OT Assessment: Pt demonstrates ADL impairment with deficits in functional transfers and functional mobility. Pt would benefit from skilled OT to address these deficits.  End of Session Communication: Bedside nurse  End of Session Patient Position: Up in chair, Alarm on     Plan:  Treatment Interventions: ADL retraining, Functional transfer training, Endurance training, Patient/family training, Equipment evaluation/education, Compensatory technique education  OT Frequency: 3 times per week  OT Discharge Recommendations: Moderate intensity level of continued care  OT Recommended Transfer Status: Moderate assist, Assist of 2  Treatment Interventions: ADL retraining, Functional transfer training, Endurance training, Patient/family training, Equipment evaluation/education, Compensatory technique education    Subjective   Previous Visit Info:  OT Last Visit  OT Received On: 09/03/24  General:  General  Reason for Referral: ADL impairment  Past Medical History Relevant to Rehab: Includes: Hematoma L knee from fall around 7/28/24, HTN, PVD, OA, CKD, Afib on warfarin, DVT.  Co-Treatment: PT  Co-Treatment Reason: To maximize pt and staff safety while completing discipline specific treatments  Prior to Session Communication: Bedside nurse  Patient Position Received: Bed, 3 rail up, Alarm on (Pt has purewick)  General Comment: Pt agreeable to OT, pleasant and cooperative.  Precautions:  UE Weight Bearing Status:  (LUE limb alert)  Medical Precautions: Fall precautions  Precautions Comment: KI obtained and donned at beginning of tx      Pain:  Pain Assessment  Pain Assessment: 0-10  0-10 (Numeric) Pain Score: 6  Pain Type: Acute pain  Pain Location: Knee  Pain Orientation: Left  Pain Interventions: Ambulation/increased  activity, Repositioned    Objective    Cognition:  Cognition  Overall Cognitive Status: Within Functional Limits  Orientation Level: Oriented X4       Bed Mobility/Transfers: Bed Mobility 1  Bed Mobility 1: Supine to sitting  Level of Assistance 1: Moderate assistance, +2, Moderate verbal cues, Moderate tactile cues  Bed Mobility Comments 1: Pt performed supine<>EOB ModA x2 with use of bedrails to lift trunk. Pt demonstrates ability to walk BLE off edge of bed with increased time and assist required to complete.    Transfer 1  Technique 1: Sit to stand, Stand to sit  Transfer Device 1:  (fww)  Transfer Level of Assistance 1: +2, Moderate verbal cues, Moderate assistance  Trials/Comments 1: Pt requires moderate VC for sequencing, good eccentric control to sitting position.  Transfers 2  Transfer From 2: Bed to  Transfer to 2: Chair without arms  Transfer Device 2: Gait belt (fww)  Transfer Level of Assistance 2: Moderate assistance, Moderate verbal cues  Trials/Comments 2: Min assist x 2 (Good AD management and carry over of cues throughout.)    Sitting Balance:  Static Sitting Balance  Static Sitting-Comment/Number of Minutes: Fair+  Dynamic Sitting Balance  Dynamic Sitting-Comments: Fair  Standing Balance:  Static Standing Balance  Static Standing-Comment/Number of Minutes: Fair-  Dynamic Standing Balance  Dynamic Standing-Comments: Poor+       Therapy/Activity: Therapeutic Activity  Therapeutic Activity Performed:  (Pt tolerated ~10 min of adl activity with 1 rest break.  Pt completed UB bathing and dressing with min assist, LB dressing with max assist, mod assist with socorro-hygiene.)      Outcome Measures:[facility] Daily Activity  Putting on and taking off regular lower body clothing: A lot  Bathing (including washing, rinsing, drying): A lot  Putting on and taking off regular upper body clothing: A little  Toileting, which includes using toilet, bedpan or urinal: A lot  Taking care of personal grooming such as  brushing teeth: A little  Eating Meals: A little  Daily Activity - Total Score: 15        Education Documentation  ADL Training, taught by Jennifer L Felty, OTA at 9/3/2024  2:33 PM.  Learner: Patient  Readiness: Acceptance  Method: Explanation, Demonstration  Response: Needs Reinforcement    EDUCATION: Pt educated on fall prevention techniques; safe transfer techniques including hand placement and positioning; techniques/strategies for balance improvement; compensatory adl techniques; safe body mechanics; energy conservation techniques.        Goals:  Encounter Problems       Encounter Problems (Active)       OT Goals       Mod assist bed mobility.  (Progressing)       Start:  08/30/24    Expected End:  09/15/24            Mod assist sit/stand, bed/chair/commode with FWW. (Progressing)       Start:  08/30/24    Expected End:  09/15/24            Good dynamic sitting for ADL.  (Progressing)       Start:  08/30/24    Expected End:  09/15/24            Poor (+) dynamic standing for ADL with UE support.  (Progressing)       Start:  08/30/24    Expected End:  09/15/24            Tolerate 10 mins light functional activity.  (Progressing)       Start:  08/30/24    Expected End:  09/15/24

## 2024-09-03 NOTE — PROGRESS NOTES
"Department of Internal Medicine  Gastroenterology  Progress note      Subjective  GI is following for Odynophagia    She states that she is tolerating her diet and able to swallow pills with no difficulty, but states that when she does swallow she gets a \"Burning\" feeling like acid reflux. She denies having to drink any kind of liquids to help her clear her throat after eating or taking pills to help swallow. She states her last BM was last week sometime, but denies any abdominal pain, nausea, or vomiting.       Current Medication    Current Facility-Administered Medications:     acetaminophen (Tylenol) tablet 650 mg, 650 mg, oral, q4h PRN, 650 mg at 08/30/24 2126 **OR** acetaminophen (Tylenol) oral liquid 650 mg, 650 mg, nasogastric tube, q4h PRN **OR** acetaminophen (Tylenol) suppository 650 mg, 650 mg, rectal, q4h PRN, Kojo Jasso MD    aspirin chewable tablet 81 mg, 81 mg, oral, Daily, JORGE Souza-CNP, 81 mg at 09/03/24 0848    benzonatate (Tessalon) capsule 200 mg, 200 mg, oral, TID PRN, JORGE Souza-CNP, 200 mg at 08/30/24 0131    calcium carbonate (Tums) chewable tablet 500 mg, 500 mg, oral, Daily, Genny Singh MD, 500 mg at 09/03/24 0848    calcium carbonate (Tums) chewable tablet 500 mg, 500 mg, oral, 4x daily PRN, Genny Singh MD    ceFAZolin (Ancef) 1 g in dextrose (iso) IV 50 mL, 1 g, intravenous, q8h, Jesika Hooper MD, Stopped at 09/03/24 0837    famotidine (Pepcid) tablet 20 mg, 20 mg, oral, Daily, Genny Singh MD, 20 mg at 09/03/24 0848    guaiFENesin (Mucinex) 12 hr tablet 600 mg, 600 mg, oral, BID PRN, Genny Singh MD, 600 mg at 09/02/24 0852    HYDROmorphone (Dilaudid) injection 0.6 mg, 0.6 mg, intravenous, q4h PRN, Nola Dinero MD, 0.6 mg at 09/02/24 0524    lactobacillus acidophilus tablet 1 tablet, 1 tablet, oral, BID, Genny Singh MD, 1 tablet at 09/03/24 0832    lidocaine 4 % patch " "1 patch, 1 patch, transdermal, Daily, Nola Tiara Dinero MD, 1 patch at 09/03/24 0848    lisinopril tablet 20 mg, 20 mg, oral, Daily, YOBANY Souza, 20 mg at 09/03/24 0848    melatonin tablet 3 mg, 3 mg, oral, Nightly PRN, Kojo Jasso MD, 3 mg at 09/01/24 2026    metoprolol tartrate (Lopressor) tablet 25 mg, 25 mg, oral, q12h, YOBANY Souza, 25 mg at 09/03/24 0847    ondansetron (Zofran) tablet 4 mg, 4 mg, oral, q8h PRN **OR** ondansetron (Zofran) injection 4 mg, 4 mg, intravenous, q8h PRN, Kojo Jasso MD, 4 mg at 08/29/24 2016    oxyCODONE (Roxicodone) immediate release tablet 2.5 mg, 2.5 mg, oral, q4h PRN, YOBANY Bliss, 2.5 mg at 08/31/24 0441    oxyCODONE (Roxicodone) immediate release tablet 5 mg, 5 mg, oral, q4h PRN, YOBANY Bliss, 5 mg at 09/02/24 2203    polyethylene glycol (Glycolax, Miralax) packet 17 g, 17 g, oral, Daily, Kojo Jasso MD, 17 g at 09/03/24 0848    sennosides-docusate sodium (Sarita-Colace) 8.6-50 mg per tablet 1 tablet, 1 tablet, oral, Nightly, YOBANY Souza, 1 tablet at 09/02/24 2014    Past Medical History  Active Ambulatory Problems     Diagnosis Date Noted    No Active Ambulatory Problems     Resolved Ambulatory Problems     Diagnosis Date Noted    No Resolved Ambulatory Problems     No Additional Past Medical History       PHYSICAL EXAM  VS: /55 (BP Location: Right arm, Patient Position: Lying)   Pulse 95   Temp 36.8 °C (98.2 °F) (Temporal)   Resp 18   Ht 1.575 m (5' 2.01\")   Wt 63 kg (139 lb)   SpO2 93%   BMI 25.42 kg/m²  Body mass index is 25.42 kg/m².  Physical Exam  Constitutional:       Appearance: Normal appearance.   HENT:      Head: Normocephalic and atraumatic.      Mouth/Throat:      Mouth: Mucous membranes are dry.   Eyes:      Conjunctiva/sclera: Conjunctivae normal.      Pupils: Pupils are equal, round, and reactive to light.   Cardiovascular:      Rate and Rhythm: Normal " rate and regular rhythm.      Pulses: Normal pulses.      Heart sounds: Murmur heard.   Pulmonary:      Effort: Pulmonary effort is normal.      Breath sounds: Normal breath sounds.   Abdominal:      General: Abdomen is flat. Bowel sounds are normal.      Palpations: Abdomen is soft.   Skin:     General: Skin is warm and dry.   Neurological:      General: No focal deficit present.      Mental Status: She is alert and oriented to person, place, and time.        DATA    Results from last 7 days   Lab Units 09/03/24  0353   WBC AUTO x10*3/uL 9.1   RBC AUTO x10*6/uL 2.97*   HEMOGLOBIN g/dL 8.9*   HEMATOCRIT % 26.3*   MCV fL 89   MCHC g/dL 33.8   RDW % 12.6   PLATELETS AUTO x10*3/uL 351       Results from last 72 hours   Lab Units 09/03/24  0353   SODIUM mmol/L 129*   POTASSIUM mmol/L 4.1   CHLORIDE mmol/L 98   CO2 mmol/L 23   BUN mg/dL 19   CREATININE mg/dL 0.95   CALCIUM mg/dL 8.5*       Results from last 72 hours   Lab Units 09/03/24  0353   INR  1.3*         ENDOSCOPIC REVIEW    Esophagram done 9/3/24    - views show dilated, gas-filled colon in the upper abdomen    -Only a limited esophagram could be performed at this time due to the  patient's present limited ability to participate   -Air-distended and barium-coated thoracic esophagus has no mass or  obvious mucosal abnormality   -Very slow clearance of the distal one third of the thoracic esophagus  with considerable spells of stasis which, in turn, resulted in  episodic nonpropulsive tertiary contractions and retrograde escape up  into the proximal thoracic esophagus  -No large, fixed hiatal hernia. No paraesophageal hernia. The  gastroesophageal junction varied in position from just below two just  above the hiatus throughout this exam when the patient was positioned  not fully upright        IMPRESSION/RECOMMENDATIONS    This is a 89 yo Female with a PMH of DCIS with mastectomy, , GERD, prior DVT (on coumadin), COVID PNA, osteoarthrosis, umbilical hernia,  HTN, partial resection of small bowel (11/2014), former tobacco use, PVD who presented to [facility] on 8/29/24 with reports of L knee pain.  GI was consulted for odynophagia.       ASSESSMENT    Odynophagia- Esophagram scheduled for today 9/3/24 which was negative for any mass or hernia. Patient is scheduled to have heart cath today 9/3/24 as well    GERD- Longstanding history according to granddaughter, however she has not taken any PPI, only TUMS as needed.       PLAN  - Recommending EGD outpatient at this time  - Continue Famotidine 20mg daily  - Continue supportive care via primary team    Discussed patient with Dr. Sapp, GI to signoff at this time. Please call with any questions or concerns.     (Electronically signed byYOBANY Mccann on 9/3/2024 at 9:30 AM)

## 2024-09-03 NOTE — PROGRESS NOTES
Patient is stable status post LHC under the care of Dr. Pizano.  Discussed results of procedure with patient and her family members.  Pictures provided.  Findings of the LHC revealed 95% stenosis in the mid LAD with MARIANO I flow and collaterals noted, 50% stenosis in the diagonal artery, 50% stenosis in the dominant mid RCA and an LVEF of 65%.  Medical management is advised.  Continue to monitor on telemetry.  Further recommendations pending clinical course.  Postprocedural activity, restrictions, potential complications, medications and future follow-up discussed at length.  All questions answered.  All verbalized understanding.

## 2024-09-03 NOTE — PROGRESS NOTES
Met with patient and daughter at bedside for follow up discharge planning needs.  Daughter has looked over [facility] list previously supplied but has not discussed or made decision on whom to send referral to.  Plan is for pt to have C today.  Agreeable to follow up later today or in the a.m. for choice.

## 2024-09-03 NOTE — PROGRESS NOTES
[facility] Progress Note           Rounding BLU/Cardiologist:  Alpesh Castellanos, JORGE-АННА, Dr. Nguyễn Centeno  Primary Cardiologist:  CCGABBY Billy     Date:  9/3/2024  Patient:  Sommer Farmer  YOB: 1933  MRN:  [id number]   Admit Date:  8/29/2024      SUBJECTIVE:    9/3/24  Patient is awake and alert sitting up in a chair.  Left knee immobilizer in place.  No chest pain or shortness of.  She states that she did have some chest pain yesterday but she has been pain-free today daughter is at the bedside discussed left heart catheterization well informed of risk versus benefits would like to proceed      VITALS:     Vitals:    09/02/24 1529 09/02/24 1944 09/03/24 0024 09/03/24 0727   BP: 125/60 119/56 123/58 110/55   BP Location:  Right arm Right arm Right arm   Patient Position:  Lying Lying Lying   Pulse: 84 91 74 95   Resp: 18 18 18 18   Temp: 36.8 °C (98.2 °F) 36.9 °C (98.4 °F) 36.6 °C (97.9 °F) 36.8 °C (98.2 °F)   TempSrc: Temporal Temporal Temporal Temporal   SpO2: 99% 97% 95% 93%   Weight:       Height:           Intake/Output Summary (Last 24 hours) at 9/3/2024 0932  Last data filed at 9/3/2024 0843  Gross per 24 hour   Intake 550 ml   Output 100 ml   Net 450 ml       Wt Readings from Last 4 Encounters:   08/29/24 63 kg (139 lb)   08/04/24 64.9 kg (143 lb)       CURRENT HOSPITAL MEDICATIONS:   aspirin, 81 mg, oral, Daily  calcium carbonate, 500 mg, oral, Daily  ceFAZolin, 1 g, intravenous, q8h  famotidine, 20 mg, oral, Daily  lactobacillus acidophilus, 1 tablet, oral, BID  lidocaine, 1 patch, transdermal, Daily  lisinopril, 20 mg, oral, Daily  metoprolol tartrate, 25 mg, oral, q12h  polyethylene glycol, 17 g, oral, Daily  sennosides-docusate sodium, 1 tablet, oral, Nightly         Current Outpatient Medications   Medication Instructions    aspirin 81 mg chewable tablet 1 tablet, oral, Daily    cephalexin (KEFLEX) 500 mg, oral, 2 times daily    cyclobenzaprine (FLEXERIL) 10 mg, oral,  Nightly PRN    diphenhydrAMINE-acetaminophen (Tylenol PM)  mg per tablet 1 tablet, oral, Nightly PRN    furosemide (LASIX) 20 mg, oral, Daily RT    lisinopril 20 mg, oral, Daily RT    methIMAzole (Tapazole) 5 mg tablet Take 0.5 tablets (2.5 mg) by mouth 3 times a week. TAKE 1/2 TABLET ON TUES, THURS, SAT AND SKIP DOSE ON MONDAYS, WEDNESDAYS, FRIDAYS, AND SUNDAYS    metoprolol tartrate (LOPRESSOR) 25 mg, oral, Every 12 hours    raNITIdine (Zantac) 150 mg tablet oral, 2 times daily PRN    traMADol (Ultram) 50 mg tablet TAKE 1 TABLET BY MOUTH TWO TIMES A DAY AS NEEDED FOR PAIN FOR UP TO 30 DAYS.    warfarin (Coumadin) 5 mg tablet Take 1 tablet (5 mg) by mouth every 7 days. On Thursday    warfarin (COUMADIN) 2.5 mg, oral, Daily, On Mon, Tue, Wed, Fri, Sat, & Sun.        PHYSICAL EXAMINATION:   General: No acute distress. Alert and oriented.  Head And Neck Examination: No jugular venous distention, no carotid bruits, no mass. Carotid upstrokes preserved. Oral mucosa moist.  No xanthelasma. Head and neck examination otherwise unremarkable.  Lungs: Clear to auscultation and percussion. No wheezes, no rales,  and no rhonchi.  Chest: Excursion appeared to be normal. No chest wall tenderness on palpation.  Heart: Normal S1 and S2. No S3. No S4. No rub. Grade 1/6 systolic murmur, best heard at the left sternal border. Point of maximal impulse was within normal limits.  Abdomen: Soft. Nontender. No organomegaly. No bruits. No masses. Obese.  Extremities: No bipedal edema. No clubbing. No cyanosis.  Pulses are strong throughout. No bruits.  Left leg dressing knee.  Musculoskeletal Exam: No ulcers, otherwise unremarkable.  Neuro: Neurologically appeared grossly intact      LAB DATA:     CBC:   Results from last 7 days   Lab Units 09/03/24  0353 09/02/24  0411 09/01/24  0410   WBC AUTO x10*3/uL 9.1 11.1 8.9   RBC AUTO x10*6/uL 2.97* 3.08* 3.11*   HEMOGLOBIN g/dL 8.9* 9.1* 9.1*   HEMATOCRIT % 26.3* 27.2* 27.2*   MCV fL 89 88  88   MCH pg 30.0 29.5 29.3   MCHC g/dL 33.8 33.5 33.5   RDW % 12.6 12.7 12.6   PLATELETS AUTO x10*3/uL 351 350 340     CMP:    Results from last 7 days   Lab Units 09/03/24 0353 09/02/24 0411 09/01/24  1834 08/29/24  1208 08/29/24  0108   SODIUM mmol/L 129* 130* 125*   < > 128*   POTASSIUM mmol/L 4.1 4.1 4.4   < > 4.1   CHLORIDE mmol/L 98 99 96*   < > 96*   CO2 mmol/L 23 24 20*   < > 22   BUN mg/dL 19 18 19   < > 25*   CREATININE mg/dL 0.95 0.86 0.97   < > 1.13*   GLUCOSE mg/dL 97 97 120*   < > 112*   PROTEIN TOTAL g/dL  --   --   --   --  6.8   CALCIUM mg/dL 8.5* 8.8 8.8   < > 9.3   BILIRUBIN TOTAL mg/dL  --   --   --   --  0.7   ALK PHOS U/L  --   --   --   --  71   AST U/L  --   --   --   --  22   ALT U/L  --   --   --   --  8    < > = values in this interval not displayed.     BMP:    Results from last 7 days   Lab Units 09/03/24 0353 09/02/24 0411 09/01/24  1834   SODIUM mmol/L 129* 130* 125*   POTASSIUM mmol/L 4.1 4.1 4.4   CHLORIDE mmol/L 98 99 96*   CO2 mmol/L 23 24 20*   BUN mg/dL 19 18 19   CREATININE mg/dL 0.95 0.86 0.97   CALCIUM mg/dL 8.5* 8.8 8.8   GLUCOSE mg/dL 97 97 120*     Magnesium:  Results from last 7 days   Lab Units 09/03/24 0353 09/02/24 0411 09/01/24  0410   MAGNESIUM mg/dL 1.75 1.82 1.72     Troponin:    Results from last 7 days   Lab Units 09/02/24 0411 09/01/24  0410 08/31/24  0922   TROPHS ng/L 211* 602* 674*     BNP:     Lipid Panel:         DIAGNOSTIC TESTING:   @No results found for this or any previous visit.    XR knee left 4+ views    Result Date: 8/29/2024  Interpreted By:  Antonia Rucker, STUDY: XR KNEE LEFT 4+ VIEWS; ;  8/29/2024 3:03 am   INDICATION: Signs/Symptoms:pain, edema left knee.   COMPARISON: 08/04/2024, 11/10/2011   ACCESSION NUMBER(S): [id number]   ORDERING CLINICIAN: KEVIN GOLDEN   FINDINGS: Four views left knee. Chronic irregularity of the articular surface of the patella and lateral femoral condyle. Very large knee joint effusion, similar to prior.  Alignment of the knee is within normal limits. Atherosclerotic vascular calcifications are noted.       No acute fracture or malalignment.   Chronic irregularity of the articular surface of the patella and lateral femoral condyle, likely related to old osteochondral injury and superimposed degenerative changes.   Grossly stable very large joint effusion.   MACRO: None   Signed by: Antonia Rucker 8/29/2024 3:40 AM Dictation workstation:   [id number]    CT angio lower extremity left w and or wo IV contrast    Result Date: 8/29/2024  Interpreted By:  Antonia Rucker, STUDY: CT ANGIO LOWER EXTREMITY LEFT W AND OR WO IV CONTRAST;  8/29/2024 2:57 am   INDICATION: Signs/Symptoms:left knee/leg edema, hx of hematoma.   COMPARISON: CT left knee 08/05/2024   ACCESSION NUMBER(S): [id number]   ORDERING CLINICIAN: KEVIN GOLDEN   TECHNIQUE: Thin-section postcontrast axial images from above the aortic bifurcation through the left lower extremity. Sagittal and coronal reconstructions. Maximum intensity projection images were obtained at a separate workstation.   FINDINGS: Vascular: The aortic bifurcation is unremarkable. No significant stenosis of the imaged right common, internal and external iliac arteries. The right common femoral artery demonstrates mild calcification with no significant stenosis. The right profunda femoris is patent with no significant stenosis. Calcification with areas of mild stenosis of the distal right femoral and popliteal arteries.   Left lower extremity: Common iliac artery: Patent. No hemodynamically significant stenosis. Internal iliac artery: Patent. No hemodynamically significant stenosis. External iliac artery: Patent. No hemodynamically significant stenosis. Common femoral artery: Patent. No hemodynamically significant stenosis. Profunda femoris artery: Patent. No hemodynamically significant stenosis. Femoral artery: Patent. Mild distal stenosis. Popliteal artery: Calcifications with severe  stenosis at the level of the femoral condyles. Tibioperoneal vessels: Limited evaluation of the tibioperoneal vessels due to dense calcification. The distal anterior tibial artery and dorsalis pedis artery are grossly patent. No definite opacification of the posterior tibial artery or plantar branches.   Grossly stable large complex, heterogeneous left knee joint effusion suggestive of hemarthrosis. No evidence of active hemorrhage. No arterial pseudoaneurysm seen. No acute osseous abnormality. Moderate left knee osteoarthrosis. Nonspecific subcutaneous edema from the mid left leg through the foot. Fatty atrophy of the left medial soleus. Right tibial intramedullary nail noted.   Imaged intrapelvic structures are remarkable for a pessary in the vagina. There is central hypodensity in the uterus measuring 1.5 cm. Rectal anastomosis noted. Fat containing midline ventral hernia.       No evidence of arterial vascular injury of the left lower extremity.   Large left knee hemarthrosis, not significantly changed from 08/05/2024. No evidence of acute osseous abnormality.   Single vessel left lower extremity runoff, with patency of the dorsalis pedis artery. Severe stenosis of the left popliteal artery.   Nonspecific left lower extremity subcutaneous edema.   Focal hypodensity in the central uterus suspicious for endometrial thickening fluid. Evaluation follow-up nonemergent pelvic ultrasound is recommended.   MACRO: Critical Finding:  See findings. Notification was initiated on 8/29/2024 at 3:32 am by  Antonia Rucker.  (**-YCF-**) Instructions:   Signed by: Antonia Rucker 8/29/2024 3:33 AM Dictation workstation:   [id number]    Lower extremity venous duplex left    Result Date: 8/29/2024  Interpreted By:  Shannon Rascon, STUDY: [facility] US LOWER EXTREMITY VENOUS DUPLEX LEFT;  8/29/2024 2:39 am   INDICATION: Signs/Symptoms:leg edema, erythema, hx of clots.   COMPARISON: Venous Doppler ultrasound left lower extremity  06/21/2015.   ACCESSION NUMBER(S): [id number]   ORDERING CLINICIAN: KEVIN GOLDEN   TECHNIQUE: Vascular ultrasound of the  left lower extremity was performed. Real time compression views as well as Gray scale, color Doppler and spectral Doppler waveform analysis was performed.   FINDINGS: Evaluation of the visualized portions of the  left common femoral vein, proximal, mid, and distal femoral vein, and popliteal vein were performed.  Evaluation of the visualized portions of the  posterior tibial and peroneal veins were also performed.  In addition, evaluation of the contralateral common femoral vein was performed.   Limitations:  There is suboptimal visualization of the calf veins due to soft tissue edema and patient's body habitus.   The evaluated veins demonstrate normal compressibility. There is intact venous flow demonstrating normal respiratory variability and normal augmentation of flow with calf compression. Therefore, there is no ultrasonographic evidence for deep vein thrombosis within the evaluated veins.       1.  No sonographic evidence for deep vein thrombosis within the evaluated veins of the left lower extremity from the inguinal region to the popliteal region. Suboptimal visualization of the left calf veins on this exam.     MACRO: None.   Signed by: Shannon Rascon 8/29/2024 3:09 AM Dictation workstation:   [id number]       CT angio chest for pulmonary embolism   Final Result   LEFT-GREATER-THAN-RIGHT SMALL FREE-FLOWING BILATERAL PLEURAL EFFUSIONS        ONE OR TWO SEGMENTS OF LEFT LOWER LOBE COLLAPSE        NO OTHER ACUTE DISEASE IN THE CHEST             NO ACUTE PULMONARY EMBOLISM THROUGH THE SEGMENTAL BRANCH LEVEL        NO AORTIC DISSECTION OR OTHER ACUTE THORACIC AORTIC FINDINGS        NO AIRSPACE CONSOLIDATION, GROUND-GLASS AIRSPACE DISEASE OR ANY OTHER   SIGN OF ACTIVE INFECTION        NO PERICARDIAL EFFUSION        NO PNEUMOTHORAX        MACRO:   None        Signed by: Ru Owen  8/31/2024 4:22 PM   Dictation workstation:   [id number]      XR knee left 4+ views   Final Result   No acute fracture or malalignment.        Chronic irregularity of the articular surface of the patella and   lateral femoral condyle, likely related to old osteochondral injury   and superimposed degenerative changes.        Grossly stable very large joint effusion.        MACRO:   None        Signed by: Antonia Rucker 8/29/2024 3:40 AM   Dictation workstation:   [id number]      CT angio lower extremity left w and or wo IV contrast   Final Result   No evidence of arterial vascular injury of the left lower extremity.        Large left knee hemarthrosis, not significantly changed from   08/05/2024. No evidence of acute osseous abnormality.        Single vessel left lower extremity runoff, with patency of the   dorsalis pedis artery. Severe stenosis of the left popliteal artery.        Nonspecific left lower extremity subcutaneous edema.        Focal hypodensity in the central uterus suspicious for endometrial   thickening fluid. Evaluation follow-up nonemergent pelvic ultrasound   is recommended.        MACRO:   Critical Finding:  See findings. Notification was initiated on   8/29/2024 at 3:32 am by  Antonia Rucker.  (**-YCF-**) Instructions:        Signed by: Antonia Rucker 8/29/2024 3:33 AM   Dictation workstation:   [id number]      Lower extremity venous duplex left   Final Result   1.  No sonographic evidence for deep vein thrombosis within the   evaluated veins of the left lower extremity from the inguinal region   to the popliteal region. Suboptimal visualization of the left calf   veins on this exam.             MACRO:   None.        Signed by: Shannon Rascon 8/29/2024 3:09 AM   Dictation workstation:   [id number]      FL GI esophagram    (Results Pending)   Cardiac Catheterization Procedure    (Results Pending)       No echocardiogram results found for the past 14 days    RADIOLOGY:     CT angio chest for  pulmonary embolism   Final Result   LEFT-GREATER-THAN-RIGHT SMALL FREE-FLOWING BILATERAL PLEURAL EFFUSIONS        ONE OR TWO SEGMENTS OF LEFT LOWER LOBE COLLAPSE        NO OTHER ACUTE DISEASE IN THE CHEST             NO ACUTE PULMONARY EMBOLISM THROUGH THE SEGMENTAL BRANCH LEVEL        NO AORTIC DISSECTION OR OTHER ACUTE THORACIC AORTIC FINDINGS        NO AIRSPACE CONSOLIDATION, GROUND-GLASS AIRSPACE DISEASE OR ANY OTHER   SIGN OF ACTIVE INFECTION        NO PERICARDIAL EFFUSION        NO PNEUMOTHORAX        MACRO:   None        Signed by: Ru Owen 8/31/2024 4:22 PM   Dictation workstation:   [id number]      XR knee left 4+ views   Final Result   No acute fracture or malalignment.        Chronic irregularity of the articular surface of the patella and   lateral femoral condyle, likely related to old osteochondral injury   and superimposed degenerative changes.        Grossly stable very large joint effusion.        MACRO:   None        Signed by: Antonia Rucker 8/29/2024 3:40 AM   Dictation workstation:   [id number]      CT angio lower extremity left w and or wo IV contrast   Final Result   No evidence of arterial vascular injury of the left lower extremity.        Large left knee hemarthrosis, not significantly changed from   08/05/2024. No evidence of acute osseous abnormality.        Single vessel left lower extremity runoff, with patency of the   dorsalis pedis artery. Severe stenosis of the left popliteal artery.        Nonspecific left lower extremity subcutaneous edema.        Focal hypodensity in the central uterus suspicious for endometrial   thickening fluid. Evaluation follow-up nonemergent pelvic ultrasound   is recommended.        MACRO:   Critical Finding:  See findings. Notification was initiated on   8/29/2024 at 3:32 am by  Antonia Rucker.  (**-YCF-**) Instructions:        Signed by: Antonia Rucker 8/29/2024 3:33 AM   Dictation workstation:   [id number]      Lower extremity venous duplex left    Final Result   1.  No sonographic evidence for deep vein thrombosis within the   evaluated veins of the left lower extremity from the inguinal region   to the popliteal region. Suboptimal visualization of the left calf   veins on this exam.             MACRO:   None.        Signed by: Shannon Rascon 8/29/2024 3:09 AM   Dictation workstation:   [id number]      FL GI esophagram    (Results Pending)   Cardiac Catheterization Procedure    (Results Pending)       PROBLEM LIST     Patient Active Problem List   Diagnosis    Hemarthrosis    Sepsis due to cellulitis (Multi)    Current use of long term anticoagulation    History of DVT (deep vein thrombosis)    Hyponatremia    NSTEMI (non-ST elevated myocardial infarction) (Multi)       ASSESSMENT:   Left lower extremity cellulitis with sepsis  Left knee hemarthrosis  Elevated D-dimer  Hyponatremia  Chest pain symptoms  Tachycardia  Non-ST elevation MI, elevated troponins (600)  Atrial fibrillation, prior history with COVID, remains in SR.  LTAC, Coumadin  Hypertension  Anemia  History of prior deep venous thrombosis, 2021, negative venous duplex 8/20/2024  GERD  Varicose vein history  Breast cancer history of mastectomy 2006  COVID pneumonia January 2021  Degenerative joint disease  Prior ventral hernia repair  Prior appendectomy  Tobacco abuse history  Family history of coronary artery disease        PLAN:     Cardiovascular Supportive [facility].  Telemetry Monitoring.  [facility]   Echocardiogram, pending  [facility] for hyponatremia.  CTA chest, rule out pulmonary embolism if primary agrees primary service.  Cardiac catheterization post PCI planned for Tuesday AM.  Coumadin currently being held, INR 1.7 9/1/24, continue to hold, with heparin IV Gtt til cath.  Further Recommendations to Follow.  See Orders.    9/3/24  Tele monitoring  Hold coumadin  Aspirin 81 daily  Lopressor 25 mg p.o. twice daily  Lisinopril 20 mg daily  Add a lipid profile  Left heart  catheterization plus or minus PCI today well-informed risk versus benefits would like to proceed with procedure  Further recommendations per Dr Jacobo Castellanos CNP  [facility]      Of note, this documentation is completed using the Dragon Dictation system (voice recognition software). There may be spelling and/or grammatical errors that were not corrected prior to final submission.    Please do not hesitate to call with questions.  Electronically signed by Alpesh Castellanos, JORGE-CNP, on 9/3/2024 at 9:32 AM

## 2024-09-03 NOTE — CARE PLAN
Problem: Safety - Adult  Goal: Free from fall injury  Outcome: Progressing     Problem: Discharge Planning  Goal: Discharge to home or other facility with appropriate resources  Outcome: Progressing     Problem: Chronic Conditions and Co-morbidities  Goal: Patient's chronic conditions and co-morbidity symptoms are monitored and maintained or improved  Outcome: Progressing     Problem: Skin  Goal: Decreased wound size/increased tissue granulation at next dressing change  Outcome: Progressing  Flowsheets (Taken 9/2/2024 2101)  Decreased wound size/increased tissue granulation at next dressing change: Promote sleep for wound healing  Goal: Participates in plan/prevention/treatment measures  Outcome: Progressing  Flowsheets (Taken 9/2/2024 2101)  Participates in plan/prevention/treatment measures: Elevate heels  Goal: Prevent/manage excess moisture  Outcome: Progressing  Flowsheets (Taken 9/2/2024 2101)  Prevent/manage excess moisture: Moisturize dry skin  Goal: Prevent/minimize sheer/friction injuries  Outcome: Progressing  Flowsheets (Taken 9/2/2024 2101)  Prevent/minimize sheer/friction injuries: Increase activity/out of bed for meals  Goal: Promote/optimize nutrition  Outcome: Progressing  Flowsheets (Taken 9/2/2024 2101)  Promote/optimize nutrition:   Consume > 50% meals/supplements   Monitor/record intake including meals  Goal: Promote skin healing  Outcome: Progressing  Flowsheets (Taken 9/2/2024 2101)  Promote skin healing: Turn/reposition every 2 hours/use positioning/transfer devices     Problem: Pain  Goal: Takes deep breaths with improved pain control throughout the shift  Outcome: Progressing  Goal: Turns in bed with improved pain control throughout the shift  Outcome: Progressing  Goal: Walks with improved pain control throughout the shift  Outcome: Progressing  Goal: Performs ADL's with improved pain control throughout shift  Outcome: Progressing  Goal: Participates in PT with improved pain control  throughout the shift  Outcome: Progressing  Goal: Free from opioid side effects throughout the shift  Outcome: Progressing  Goal: Free from acute confusion related to pain meds throughout the shift  Outcome: Progressing   The patient's goals for the shift include  rest    The clinical goals for the shift include safety

## 2024-09-04 LAB
ALBUMIN SERPL BCP-MCNC: 3.3 G/DL (ref 3.4–5)
ANION GAP SERPL CALC-SCNC: 11 MMOL/L (ref 10–20)
ANION GAP SERPL CALC-SCNC: 18 MMOL/L (ref 10–20)
ATRIAL RATE: 76 BPM
BUN SERPL-MCNC: 20 MG/DL (ref 6–23)
BUN SERPL-MCNC: 20 MG/DL (ref 6–23)
CALCIUM SERPL-MCNC: 9 MG/DL (ref 8.6–10.3)
CALCIUM SERPL-MCNC: 9.1 MG/DL (ref 8.6–10.3)
CHLORIDE SERPL-SCNC: 97 MMOL/L (ref 98–107)
CHLORIDE SERPL-SCNC: 97 MMOL/L (ref 98–107)
CO2 SERPL-SCNC: 20 MMOL/L (ref 21–32)
CO2 SERPL-SCNC: 25 MMOL/L (ref 21–32)
CREAT SERPL-MCNC: 0.95 MG/DL (ref 0.5–1.05)
CREAT SERPL-MCNC: 1 MG/DL (ref 0.5–1.05)
CREAT UR-MCNC: 212.6 MG/DL (ref 20–320)
EGFRCR SERPLBLD CKD-EPI 2021: 53 ML/MIN/1.73M*2
EGFRCR SERPLBLD CKD-EPI 2021: 57 ML/MIN/1.73M*2
ERYTHROCYTE [DISTWIDTH] IN BLOOD BY AUTOMATED COUNT: 12.6 % (ref 11.5–14.5)
GLUCOSE SERPL-MCNC: 102 MG/DL (ref 74–99)
GLUCOSE SERPL-MCNC: 85 MG/DL (ref 74–99)
HCT VFR BLD AUTO: 29.2 % (ref 36–46)
HGB BLD-MCNC: 9.6 G/DL (ref 12–16)
HOLD SPECIMEN: NORMAL
MCH RBC QN AUTO: 29.4 PG (ref 26–34)
MCHC RBC AUTO-ENTMCNC: 32.9 G/DL (ref 32–36)
MCV RBC AUTO: 89 FL (ref 80–100)
NRBC BLD-RTO: 0 /100 WBCS (ref 0–0)
OSMOLALITY SERPL: 277 MOSM/KG (ref 280–300)
P AXIS: 43 DEGREES
P OFFSET: 171 MS
P ONSET: 113 MS
PHOSPHATE SERPL-MCNC: 2.9 MG/DL (ref 2.5–4.9)
PLATELET # BLD AUTO: 407 X10*3/UL (ref 150–450)
POTASSIUM SERPL-SCNC: 3.9 MMOL/L (ref 3.5–5.3)
POTASSIUM SERPL-SCNC: 4 MMOL/L (ref 3.5–5.3)
PR INTERVAL: 222 MS
Q ONSET: 224 MS
QRS COUNT: 12 BEATS
QRS DURATION: 102 MS
QT INTERVAL: 386 MS
QTC CALCULATION(BAZETT): 434 MS
QTC FREDERICIA: 417 MS
R AXIS: -21 DEGREES
RBC # BLD AUTO: 3.27 X10*6/UL (ref 4–5.2)
SODIUM SERPL-SCNC: 129 MMOL/L (ref 136–145)
SODIUM SERPL-SCNC: 131 MMOL/L (ref 136–145)
SODIUM UR-SCNC: 85 MMOL/L
SODIUM/CREAT UR-RTO: 40 MMOL/G CREAT
T AXIS: -26 DEGREES
T OFFSET: 417 MS
VENTRICULAR RATE: 76 BPM
WBC # BLD AUTO: 12.6 X10*3/UL (ref 4.4–11.3)

## 2024-09-04 PROCEDURE — 97530 THERAPEUTIC ACTIVITIES: CPT | Mod: GP,CQ

## 2024-09-04 PROCEDURE — 2500000001 HC RX 250 WO HCPCS SELF ADMINISTERED DRUGS (ALT 637 FOR MEDICARE OP): Performed by: NURSE PRACTITIONER

## 2024-09-04 PROCEDURE — 83930 ASSAY OF BLOOD OSMOLALITY: CPT | Mod: ELYLAB | Performed by: INTERNAL MEDICINE

## 2024-09-04 PROCEDURE — 2500000005 HC RX 250 GENERAL PHARMACY W/O HCPCS: Performed by: STUDENT IN AN ORGANIZED HEALTH CARE EDUCATION/TRAINING PROGRAM

## 2024-09-04 PROCEDURE — 2500000001 HC RX 250 WO HCPCS SELF ADMINISTERED DRUGS (ALT 637 FOR MEDICARE OP): Performed by: STUDENT IN AN ORGANIZED HEALTH CARE EDUCATION/TRAINING PROGRAM

## 2024-09-04 PROCEDURE — 99232 SBSQ HOSP IP/OBS MODERATE 35: CPT | Performed by: STUDENT IN AN ORGANIZED HEALTH CARE EDUCATION/TRAINING PROGRAM

## 2024-09-04 PROCEDURE — 85027 COMPLETE CBC AUTOMATED: CPT | Performed by: STUDENT IN AN ORGANIZED HEALTH CARE EDUCATION/TRAINING PROGRAM

## 2024-09-04 PROCEDURE — 80069 RENAL FUNCTION PANEL: CPT | Performed by: STUDENT IN AN ORGANIZED HEALTH CARE EDUCATION/TRAINING PROGRAM

## 2024-09-04 PROCEDURE — 2500000004 HC RX 250 GENERAL PHARMACY W/ HCPCS (ALT 636 FOR OP/ED): Mod: JZ | Performed by: INTERNAL MEDICINE

## 2024-09-04 PROCEDURE — 99232 SBSQ HOSP IP/OBS MODERATE 35: CPT | Performed by: NURSE PRACTITIONER

## 2024-09-04 PROCEDURE — 82570 ASSAY OF URINE CREATININE: CPT | Performed by: INTERNAL MEDICINE

## 2024-09-04 PROCEDURE — 2500000004 HC RX 250 GENERAL PHARMACY W/ HCPCS (ALT 636 FOR OP/ED): Performed by: STUDENT IN AN ORGANIZED HEALTH CARE EDUCATION/TRAINING PROGRAM

## 2024-09-04 PROCEDURE — 2500000001 HC RX 250 WO HCPCS SELF ADMINISTERED DRUGS (ALT 637 FOR MEDICARE OP): Performed by: HOSPITALIST

## 2024-09-04 PROCEDURE — 83935 ASSAY OF URINE OSMOLALITY: CPT | Mod: ELYLAB | Performed by: INTERNAL MEDICINE

## 2024-09-04 PROCEDURE — 1200000002 HC GENERAL ROOM WITH TELEMETRY DAILY

## 2024-09-04 PROCEDURE — 80048 BASIC METABOLIC PNL TOTAL CA: CPT | Mod: CCI | Performed by: INTERNAL MEDICINE

## 2024-09-04 PROCEDURE — 36415 COLL VENOUS BLD VENIPUNCTURE: CPT | Performed by: STUDENT IN AN ORGANIZED HEALTH CARE EDUCATION/TRAINING PROGRAM

## 2024-09-04 RX ORDER — CEPHALEXIN 500 MG/1
500 CAPSULE ORAL EVERY 6 HOURS SCHEDULED
Status: DISPENSED | OUTPATIENT
Start: 2024-09-04 | End: 2024-09-08

## 2024-09-04 RX ORDER — ISOSORBIDE MONONITRATE 60 MG/1
60 TABLET, EXTENDED RELEASE ORAL DAILY
Qty: 30 TABLET | Refills: 11 | Status: SHIPPED | OUTPATIENT
Start: 2024-09-04 | End: 2025-09-04

## 2024-09-04 RX ORDER — ISOSORBIDE MONONITRATE 30 MG/1
30 TABLET, EXTENDED RELEASE ORAL DAILY
Status: DISCONTINUED | OUTPATIENT
Start: 2024-09-05 | End: 2024-09-09 | Stop reason: HOSPADM

## 2024-09-04 RX ORDER — ISOSORBIDE MONONITRATE 60 MG/1
60 TABLET, EXTENDED RELEASE ORAL DAILY
Status: DISCONTINUED | OUTPATIENT
Start: 2024-09-04 | End: 2024-09-04

## 2024-09-04 ASSESSMENT — COGNITIVE AND FUNCTIONAL STATUS - GENERAL
MOVING TO AND FROM BED TO CHAIR: A LITTLE
MOVING TO AND FROM BED TO CHAIR: A LITTLE
DAILY ACTIVITIY SCORE: 21
MOBILITY SCORE: 19
STANDING UP FROM CHAIR USING ARMS: A LITTLE
WALKING IN HOSPITAL ROOM: A LITTLE
HELP NEEDED FOR BATHING: A LITTLE
WALKING IN HOSPITAL ROOM: A LITTLE
CLIMB 3 TO 5 STEPS WITH RAILING: A LOT
DRESSING REGULAR LOWER BODY CLOTHING: A LITTLE
DRESSING REGULAR LOWER BODY CLOTHING: A LITTLE
MOBILITY SCORE: 14
MOBILITY SCORE: 19
MOVING TO AND FROM BED TO CHAIR: A LITTLE
DAILY ACTIVITIY SCORE: 21
WALKING IN HOSPITAL ROOM: A LITTLE
TOILETING: A LITTLE
TOILETING: A LITTLE
STANDING UP FROM CHAIR USING ARMS: A LITTLE
MOVING FROM LYING ON BACK TO SITTING ON SIDE OF FLAT BED WITH BEDRAILS: A LOT
TURNING FROM BACK TO SIDE WHILE IN FLAT BAD: A LOT
HELP NEEDED FOR BATHING: A LITTLE
CLIMB 3 TO 5 STEPS WITH RAILING: A LOT
CLIMB 3 TO 5 STEPS WITH RAILING: TOTAL
STANDING UP FROM CHAIR USING ARMS: A LITTLE

## 2024-09-04 ASSESSMENT — PAIN - FUNCTIONAL ASSESSMENT
PAIN_FUNCTIONAL_ASSESSMENT: 0-10

## 2024-09-04 ASSESSMENT — PAIN DESCRIPTION - ORIENTATION
ORIENTATION: MID;RIGHT;LEFT
ORIENTATION: LEFT

## 2024-09-04 ASSESSMENT — PAIN DESCRIPTION - LOCATION
LOCATION: KNEE
LOCATION: ABDOMEN

## 2024-09-04 ASSESSMENT — PAIN SCALES - GENERAL
PAINLEVEL_OUTOF10: 0 - NO PAIN
PAINLEVEL_OUTOF10: 8
PAINLEVEL_OUTOF10: 6
PAINLEVEL_OUTOF10: 8
PAINLEVEL_OUTOF10: 0 - NO PAIN
PAINLEVEL_OUTOF10: 2
PAINLEVEL_OUTOF10: 2
PAINLEVEL_OUTOF10: 7

## 2024-09-04 NOTE — CARE PLAN
The patient's goals for the shift include  rest  Problem: Safety - Adult  Goal: Free from fall injury  Outcome: Progressing     Problem: Discharge Planning  Goal: Discharge to home or other facility with appropriate resources  Outcome: Progressing     Problem: Chronic Conditions and Co-morbidities  Goal: Patient's chronic conditions and co-morbidity symptoms are monitored and maintained or improved  Outcome: Progressing     Problem: Skin  Goal: Decreased wound size/increased tissue granulation at next dressing change  Outcome: Progressing  Goal: Participates in plan/prevention/treatment measures  Outcome: Progressing  Goal: Prevent/manage excess moisture  Outcome: Progressing  Goal: Prevent/minimize sheer/friction injuries  Outcome: Progressing  Goal: Promote/optimize nutrition  Outcome: Progressing  Goal: Promote skin healing  Outcome: Progressing     Problem: Pain  Goal: Takes deep breaths with improved pain control throughout the shift  Outcome: Progressing  Goal: Turns in bed with improved pain control throughout the shift  Outcome: Progressing  Goal: Walks with improved pain control throughout the shift  Outcome: Progressing  Goal: Performs ADL's with improved pain control throughout shift  Outcome: Progressing  Goal: Participates in PT with improved pain control throughout the shift  Outcome: Progressing  Goal: Free from opioid side effects throughout the shift  Outcome: Progressing  Goal: Free from acute confusion related to pain meds throughout the shift  Outcome: Progressing       The clinical goals for the shift include safety

## 2024-09-04 NOTE — CARE PLAN
The patient's goals for the shift include  ambulation and rest    The clinical goals for the shift include Patient will report increased comfort throughout shift

## 2024-09-04 NOTE — PROGRESS NOTES
"Rounded with Attending physician at bedside and spoke with patient and ' grand daughter\" at bedside  to discuss dc planning.  Therapy evaluations completed with [facility] 11/15. Pt is agreeable to SNF at discharge with continued therapy.  SNF list printed from [facility] and provided to patient to look over.  Per patient permission our team may contact daughter Areli whom is HCPOA to discuss dc needs and preferences.  Updated LSW plan is now SNF and follow up is needed for preference.  "

## 2024-09-04 NOTE — PROGRESS NOTES
Per agatha-eliana pt. Will need snf placement has spoken to pt. Who is now in agreement.  She is requesting we contact her dtr./joana more to obtain facility choice.  Spoke with dtr. Areli who states she is driving her from [place] and will arrive early evening.  Discharge planning discussed/snf.  She would like to review list with pt. And will get back with me.  Snf list from [facility] emailed to dtr.

## 2024-09-04 NOTE — PROGRESS NOTES
PROGRESS NOTE    ASSESSMENT AND PLAN:     #. Troponin elevation  #. Chest pain  -ECG shows no acute ischemic changes  -Troponin was elevated at 667, trending down.  -CT angio showed negative for pulmonary embolism, shows pleural effusion  -S/p LHC on 9/3. 95% stenosis in the mid LAD with MARIANO I flow and collaterals noted, 50% stenosis in the diagonal artery, 50% stenosis in the dominant mid RCA and an LVEF of 65%.     Plan:  -Continue aspirin  -Cards following, recs appreciated. Continued medical management advised post [facility].   -Imdur 60 mg daily initiated today, will back off to 30 mg daily to soft blood pressures and near syncopal event.     #.  Sepsis secondary to left lower extremity cellulitis  -Presented with leukocytosis and tachycardia, diffuse erythema of left lower extremity without drainage  -Seen by infectious disease.  -ancef dc'd as IV access lost. Will place on PO keflex  -Cellulitis, overall improved     #. Gastroesophageal reflux disease  -Continues to have chest pain with eating.  -GI following, recs appreciated. Plan for outpatient EGD. Advise continued supportive care & famotidine 20 mg daily  -S/p esophogram which was negative for mass/mucosal abnormality but did note slowed clearance through distal thoracic esophagus.   -Patient is allergic to PPIs, continue famotidine and Tums as needed.    #.  Left knee pain 2/2 hemarthrosis of left knee  -Likely induced by trauma while on Coumadin, Doppler negative for DVT  -CT scan shows a large left knee hemarthrosis not significantly changed from 8/5  -Orthopedic surgery consult, attempted an aspiration but were unable to aspirate.  -Overall improving    #.  Acute hypoxic respiratory failure  #.  Pleural effusion  #.  Hyponatremia  -Status post 1 dose of tolvaptan  -Serum sodium improved and stabilized. 125 -> 130 -> 129 -> 129. Will continue to monitor Nephro following; recs appreciated. Repeat serum/urine osm/Ev pending in consideration of urea tablets  "on DC     #.  History of DVT in 2021  #   Paroxysmal Afib  #.  Long-term anticoagulation  -Doppler negative for DVT  -Supratherapeutic INR on presentation, now resolved.  -It appears that her DVT was provoked in 2021 when she had COVID-19.  Family will check with her doctor to see if she has any hypercoagulable disease.  If not patient can be discontinued off anticoagulation given that this is her first provoked DVT, and she has a history of multiple falls.    -Will place on low dose eliquis given risk of falls and hx of afib.     Disposition: Care coordinators arranging DC to SNF, likely on 9/5/24    SUBJECTIVE:   Admit Date: 8/29/2024    Patient had near syncopal event while working with PT/OT earlier today. Did not fall or lose consciousness. Patient felt warm, light headed, and dizzy while standing with walker and was assisted back down to chair. Patient denies any numbness, weakness, tingling, or difficulty with speech/swallow. Otherwise patient feels well. Discussed DC home vs rehab facility and SNF placement now favored. Further ROS was unremarkable.     OBJECTIVE:   Vitals: BP 98/55 (BP Location: Right arm, Patient Position: Sitting)   Pulse 94   Temp 36.4 °C (97.5 °F) (Temporal)   Resp 19   Ht 1.575 m (5' 2.01\")   Wt 63 kg (139 lb)   SpO2 94%   BMI 25.42 kg/m²    Wt Readings from Last 3 Encounters:   08/29/24 63 kg (139 lb)   08/04/24 64.9 kg (143 lb)      24HR INTAKE/OUTPUT:    Intake/Output Summary (Last 24 hours) at 9/4/2024 1356  Last data filed at 9/4/2024 1150  Gross per 24 hour   Intake 560 ml   Output 301 ml   Net 259 ml       PHYSICAL EXAM:   GENERAL: Laying in bed, does not appear to be in any distress.   HEENT: HEAD: Normocephalic atraumatic.  Neck: Supple.  Eyes: Pupils are reactive to direct light.   CVS: S1, S2 heard. Regular rate and rhythm  LUNGS: Clear to auscultate bilaterally. No wheezing or rhonchi appreciated.  ABDOMEN: Soft, nontender to palpate. Positive bowel sounds. No " "guarding or rebound appreciated.  NEUROLOGICAL: No focal neurological deficits appreciated. Cranial nerves are grossly intact.  EXTREMITIES: Left knee covered in clean bandaging.   SKIN:  Grossly intact, warm and dry.      LABS/IMAGING AND MEDICATIONS:   Scheduled Meds:aspirin, 81 mg, oral, Daily  calcium carbonate, 500 mg, oral, Daily  cephalexin, 500 mg, oral, q6h HAILE  famotidine, 20 mg, oral, Daily  isosorbide mononitrate ER, 60 mg, oral, Daily  lactobacillus acidophilus, 1 tablet, oral, BID  lidocaine, 1 patch, transdermal, Daily  lisinopril, 20 mg, oral, Daily  metoprolol tartrate, 25 mg, oral, q12h  polyethylene glycol, 17 g, oral, Daily  sennosides-docusate sodium, 1 tablet, oral, Nightly      PRN Meds:PRN medications: acetaminophen **OR** acetaminophen **OR** acetaminophen, benzonatate, calcium carbonate, guaiFENesin, HYDROmorphone, melatonin, ondansetron **OR** ondansetron, oxyCODONE, oxyCODONE    No lab exists for component: \"CBC\"   No lab exists for component: \"CMP\"   No lab exists for component: \"TROPONIN\"      Results from last 7 days   Lab Units 09/03/24  0353   INR  1.3*     No lab exists for component: \"LIPIDS\"       No lab exists for component: \"URINALYSIS\"          BMP:  Results from last 7 days   Lab Units 09/04/24  0353 09/03/24 0353 09/02/24  0411   SODIUM mmol/L 129* 129* 130*   POTASSIUM mmol/L 3.9 4.1 4.1   CHLORIDE mmol/L 97* 98 99   CO2 mmol/L 25 23 24   BUN mg/dL 20 19 18   CREATININE mg/dL 0.95 0.95 0.86       CBC:  Results from last 7 days   Lab Units 09/04/24  0353 09/03/24  0353 09/02/24  0411   WBC AUTO x10*3/uL 12.6* 9.1 11.1   RBC AUTO x10*6/uL 3.27* 2.97* 3.08*   HEMOGLOBIN g/dL 9.6* 8.9* 9.1*   HEMATOCRIT % 29.2* 26.3* 27.2*   MCV fL 89 89 88   MCH pg 29.4 30.0 29.5   MCHC g/dL 32.9 33.8 33.5   RDW % 12.6 12.6 12.7   PLATELETS AUTO x10*3/uL 407 351 350       Cardiac Enzymes:   Results from last 7 days   Lab Units 09/02/24  0411 09/01/24  0410 08/31/24  0922   TROPHS ng/L 211* " "602* 674*   CK TOTAL U/L  --   --  54         Hepatic Function Panel:  Results from last 7 days   Lab Units 08/29/24  0108   ALK PHOS U/L 71   ALT U/L 8   AST U/L 22   PROTEIN TOTAL g/dL 6.8   BILIRUBIN TOTAL mg/dL 0.7       Magnesium:  Results from last 7 days   Lab Units 09/03/24  0353   MAGNESIUM mg/dL 1.75       Pro-BNP:  No results found for: \"PROBNP\"    INR:  Results from last 7 days   Lab Units 09/03/24  0353 09/01/24  1311 09/01/24  0753   PROTIME seconds 15.1* 18.2* 19.6*   INR  1.3* 1.6* 1.7*       TSH:  Lab Results   Component Value Date    TSH 2.39 09/02/2024       Lipid Profile:  Results from last 7 days   Lab Units 09/03/24  0353   TRIGLYCERIDES mg/dL 82   HDL mg/dL 43.6   LDL CALC mg/dL 71       HgbA1C:        Lactate Level:  No lab exists for component: \"LACTA\"    CMP:  Results from last 7 days   Lab Units 09/04/24  0353 09/03/24  0353 09/02/24  0411 08/29/24  1208 08/29/24  0108   SODIUM mmol/L 129* 129* 130*   < > 128*   POTASSIUM mmol/L 3.9 4.1 4.1   < > 4.1   CHLORIDE mmol/L 97* 98 99   < > 96*   CO2 mmol/L 25 23 24   < > 22   BUN mg/dL 20 19 18   < > 25*   CREATININE mg/dL 0.95 0.95 0.86   < > 1.13*   GLUCOSE mg/dL 102* 97 97   < > 112*   CALCIUM mg/dL 9.0 8.5* 8.8   < > 9.3   PROTEIN TOTAL g/dL  --   --   --   --  6.8   BILIRUBIN TOTAL mg/dL  --   --   --   --  0.7   ALK PHOS U/L  --   --   --   --  71   AST U/L  --   --   --   --  22   ALT U/L  --   --   --   --  8    < > = values in this interval not displayed.       Amylase:        Lipase:        ABG:        No lab exists for component: \"PO2\", \"PCO2\", \"HCO3\", \"BE\", \"O2SAT\"       "

## 2024-09-04 NOTE — PROGRESS NOTES
[facility]           Rounding BLU/Cardiologist:  Alpesh Castellanos, JORGE-АННА, Dr. Nguyễn Centeno  Primary Cardiologist:  CCGABBY Billy     Date:  9/4/2024  Patient:  Sommer Farmer  YOB: 1933  MRN:  [id number]   Admit Date:  8/29/2024      SUBJECTIVE:    9/4/24  Patient is awake and alert and oriented x 3.  Sitting up at side of the bed we spoke at length with patient regarding the Coumadin currently holding it at this time.  Okay to discharge from cardiac standpoint  Telemetry is normal sinus rhythm with occasional PVC    9/3/24  Patient is awake and alert sitting up in a chair.  Left knee immobilizer in place.  No chest pain or shortness of.  She states that she did have some chest pain yesterday but she has been pain-free today daughter is at the bedside discussed left heart catheterization well informed of risk versus benefits would like to proceed      VITALS:     Vitals:    09/03/24 2002 09/03/24 2226 09/04/24 0406 09/04/24 0715   BP:  162/69 151/63 158/72   BP Location:  Right arm Right arm Right arm   Patient Position:  Lying Lying Sitting   Pulse: 71 85 94 109   Resp: 18 20 20 18   Temp:  36.8 °C (98.2 °F) 37.4 °C (99.3 °F) 36.4 °C (97.5 °F)   TempSrc:  Temporal Temporal Temporal   SpO2:  95% 96% 96%   Weight:       Height:           Intake/Output Summary (Last 24 hours) at 9/4/2024 0921  Last data filed at 9/4/2024 0858  Gross per 24 hour   Intake 440 ml   Output 601 ml   Net -161 ml       Wt Readings from Last 4 Encounters:   08/29/24 63 kg (139 lb)   08/04/24 64.9 kg (143 lb)       CURRENT HOSPITAL MEDICATIONS:   aspirin, 81 mg, oral, Daily  calcium carbonate, 500 mg, oral, Daily  ceFAZolin, 1 g, intravenous, q8h  famotidine, 20 mg, oral, Daily  isosorbide mononitrate ER, 60 mg, oral, Daily  lactobacillus acidophilus, 1 tablet, oral, BID  lidocaine, 1 patch, transdermal, Daily  lisinopril, 20 mg, oral, Daily  metoprolol tartrate, 25 mg, oral, q12h  polyethylene  glycol, 17 g, oral, Daily  sennosides-docusate sodium, 1 tablet, oral, Nightly         Current Outpatient Medications   Medication Instructions    aspirin 81 mg chewable tablet 1 tablet, oral, Daily    cephalexin (KEFLEX) 500 mg, oral, 2 times daily    cyclobenzaprine (FLEXERIL) 10 mg, oral, Nightly PRN    diphenhydrAMINE-acetaminophen (Tylenol PM)  mg per tablet 1 tablet, oral, Nightly PRN    furosemide (LASIX) 20 mg, oral, Daily RT    isosorbide mononitrate ER (IMDUR) 60 mg, oral, Daily, Do not crush or chew.    lisinopril 20 mg, oral, Daily RT    methIMAzole (Tapazole) 5 mg tablet Take 0.5 tablets (2.5 mg) by mouth 3 times a week. TAKE 1/2 TABLET ON TUES, THURS, SAT AND SKIP DOSE ON MONDAYS, WEDNESDAYS, FRIDAYS, AND SUNDAYS    metoprolol tartrate (LOPRESSOR) 25 mg, oral, Every 12 hours    raNITIdine (Zantac) 150 mg tablet oral, 2 times daily PRN    traMADol (Ultram) 50 mg tablet TAKE 1 TABLET BY MOUTH TWO TIMES A DAY AS NEEDED FOR PAIN FOR UP TO 30 DAYS.    warfarin (Coumadin) 5 mg tablet Take 1 tablet (5 mg) by mouth every 7 days. On Thursday    warfarin (COUMADIN) 2.5 mg, oral, Daily, On Mon, Tue, Wed, Fri, Sat, & Sun.        PHYSICAL EXAMINATION:   General: No acute distress. Alert and oriented.  Head And Neck Examination: No jugular venous distention, no carotid bruits, no mass. Carotid upstrokes preserved. Oral mucosa moist.  No xanthelasma. Head and neck examination otherwise unremarkable.  Lungs: Clear to auscultation and percussion. No wheezes, no rales,  and no rhonchi.  Chest: Excursion appeared to be normal. No chest wall tenderness on palpation.  Heart: Normal S1 and S2. No S3. No S4. No rub. Grade 1/6 systolic murmur, best heard at the left sternal border. Point of maximal impulse was within normal limits.  Abdomen: Soft. Nontender. No organomegaly. No bruits. No masses. Obese.  Extremities: No bipedal edema. No clubbing. No cyanosis.  Pulses are strong throughout. No bruits.  Left leg dressing  knee.  Right groin soft [place] or hematomas or bruit  Musculoskeletal Exam: No ulcers, otherwise unremarkable.  Neuro: Neurologically appeared grossly intact      LAB DATA:     CBC:   Results from last 7 days   Lab Units 09/04/24 0353 09/03/24 0353 09/02/24 0411   WBC AUTO x10*3/uL 12.6* 9.1 11.1   RBC AUTO x10*6/uL 3.27* 2.97* 3.08*   HEMOGLOBIN g/dL 9.6* 8.9* 9.1*   HEMATOCRIT % 29.2* 26.3* 27.2*   MCV fL 89 89 88   MCH pg 29.4 30.0 29.5   MCHC g/dL 32.9 33.8 33.5   RDW % 12.6 12.6 12.7   PLATELETS AUTO x10*3/uL 407 351 350     CMP:    Results from last 7 days   Lab Units 09/04/24 0353 09/03/24 0353 09/02/24 0411 08/29/24  1208 08/29/24  0108   SODIUM mmol/L 129* 129* 130*   < > 128*   POTASSIUM mmol/L 3.9 4.1 4.1   < > 4.1   CHLORIDE mmol/L 97* 98 99   < > 96*   CO2 mmol/L 25 23 24   < > 22   BUN mg/dL 20 19 18   < > 25*   CREATININE mg/dL 0.95 0.95 0.86   < > 1.13*   GLUCOSE mg/dL 102* 97 97   < > 112*   PROTEIN TOTAL g/dL  --   --   --   --  6.8   CALCIUM mg/dL 9.0 8.5* 8.8   < > 9.3   BILIRUBIN TOTAL mg/dL  --   --   --   --  0.7   ALK PHOS U/L  --   --   --   --  71   AST U/L  --   --   --   --  22   ALT U/L  --   --   --   --  8    < > = values in this interval not displayed.     BMP:    Results from last 7 days   Lab Units 09/04/24 0353 09/03/24 0353 09/02/24 0411   SODIUM mmol/L 129* 129* 130*   POTASSIUM mmol/L 3.9 4.1 4.1   CHLORIDE mmol/L 97* 98 99   CO2 mmol/L 25 23 24   BUN mg/dL 20 19 18   CREATININE mg/dL 0.95 0.95 0.86   CALCIUM mg/dL 9.0 8.5* 8.8   GLUCOSE mg/dL 102* 97 97     Magnesium:  Results from last 7 days   Lab Units 09/03/24  0353 09/02/24  0411 09/01/24  0410   MAGNESIUM mg/dL 1.75 1.82 1.72     Troponin:    Results from last 7 days   Lab Units 09/02/24  0411 09/01/24  0410 08/31/24  0922   TROPHS ng/L 211* 602* 674*     BNP:     Lipid Panel:  Results from last 7 days   Lab Units 09/03/24  0353   HDL mg/dL 43.6   CHOLESTEROL/HDL RATIO  3.0   VLDL mg/dL 16   TRIGLYCERIDES mg/dL  82   NON HDL CHOL. mg/dL 87        DIAGNOSTIC TESTING:   @No results found for this or any previous visit.    XR knee left 4+ views    Result Date: 8/29/2024  Interpreted By:  Antonia Rucker, STUDY: XR KNEE LEFT 4+ VIEWS; ;  8/29/2024 3:03 am   INDICATION: Signs/Symptoms:pain, edema left knee.   COMPARISON: 08/04/2024, 11/10/2011   ACCESSION NUMBER(S): [id number]   ORDERING CLINICIAN: KEVIN GOLDEN   FINDINGS: Four views left knee. Chronic irregularity of the articular surface of the patella and lateral femoral condyle. Very large knee joint effusion, similar to prior. Alignment of the knee is within normal limits. Atherosclerotic vascular calcifications are noted.       No acute fracture or malalignment.   Chronic irregularity of the articular surface of the patella and lateral femoral condyle, likely related to old osteochondral injury and superimposed degenerative changes.   Grossly stable very large joint effusion.   MACRO: None   Signed by: Antonia Rucker 8/29/2024 3:40 AM Dictation workstation:   [id number]    CT angio lower extremity left w and or wo IV contrast    Result Date: 8/29/2024  Interpreted By:  Antonia Rucker, STUDY: CT ANGIO LOWER EXTREMITY LEFT W AND OR WO IV CONTRAST;  8/29/2024 2:57 am   INDICATION: Signs/Symptoms:left knee/leg edema, hx of hematoma.   COMPARISON: CT left knee 08/05/2024   ACCESSION NUMBER(S): [id number]   ORDERING CLINICIAN: KEVIN GOLDEN   TECHNIQUE: Thin-section postcontrast axial images from above the aortic bifurcation through the left lower extremity. Sagittal and coronal reconstructions. Maximum intensity projection images were obtained at a separate workstation.   FINDINGS: Vascular: The aortic bifurcation is unremarkable. No significant stenosis of the imaged right common, internal and external iliac arteries. The right common femoral artery demonstrates mild calcification with no significant stenosis. The right profunda femoris is patent with no significant  stenosis. Calcification with areas of mild stenosis of the distal right femoral and popliteal arteries.   Left lower extremity: Common iliac artery: Patent. No hemodynamically significant stenosis. Internal iliac artery: Patent. No hemodynamically significant stenosis. External iliac artery: Patent. No hemodynamically significant stenosis. Common femoral artery: Patent. No hemodynamically significant stenosis. Profunda femoris artery: Patent. No hemodynamically significant stenosis. Femoral artery: Patent. Mild distal stenosis. Popliteal artery: Calcifications with severe stenosis at the level of the femoral condyles. Tibioperoneal vessels: Limited evaluation of the tibioperoneal vessels due to dense calcification. The distal anterior tibial artery and dorsalis pedis artery are grossly patent. No definite opacification of the posterior tibial artery or plantar branches.   Grossly stable large complex, heterogeneous left knee joint effusion suggestive of hemarthrosis. No evidence of active hemorrhage. No arterial pseudoaneurysm seen. No acute osseous abnormality. Moderate left knee osteoarthrosis. Nonspecific subcutaneous edema from the mid left leg through the foot. Fatty atrophy of the left medial soleus. Right tibial intramedullary nail noted.   Imaged intrapelvic structures are remarkable for a pessary in the vagina. There is central hypodensity in the uterus measuring 1.5 cm. Rectal anastomosis noted. Fat containing midline ventral hernia.       No evidence of arterial vascular injury of the left lower extremity.   Large left knee hemarthrosis, not significantly changed from 08/05/2024. No evidence of acute osseous abnormality.   Single vessel left lower extremity runoff, with patency of the dorsalis pedis artery. Severe stenosis of the left popliteal artery.   Nonspecific left lower extremity subcutaneous edema.   Focal hypodensity in the central uterus suspicious for endometrial thickening fluid. Evaluation  follow-up nonemergent pelvic ultrasound is recommended.   MACRO: Critical Finding:  See findings. Notification was initiated on 8/29/2024 at 3:32 am by  Antonia Rucker.  (**-YCF-**) Instructions:   Signed by: Antonia Rucker 8/29/2024 3:33 AM Dictation workstation:   [id number]    Lower extremity venous duplex left    Result Date: 8/29/2024  Interpreted By:  Shannon Rascon, STUDY: [facility] US LOWER EXTREMITY VENOUS DUPLEX LEFT;  8/29/2024 2:39 am   INDICATION: Signs/Symptoms:leg edema, erythema, hx of clots.   COMPARISON: Venous Doppler ultrasound left lower extremity 06/21/2015.   ACCESSION NUMBER(S): [id number]   ORDERING CLINICIAN: KEVIN GOLDEN   TECHNIQUE: Vascular ultrasound of the  left lower extremity was performed. Real time compression views as well as Gray scale, color Doppler and spectral Doppler waveform analysis was performed.   FINDINGS: Evaluation of the visualized portions of the  left common femoral vein, proximal, mid, and distal femoral vein, and popliteal vein were performed.  Evaluation of the visualized portions of the  posterior tibial and peroneal veins were also performed.  In addition, evaluation of the contralateral common femoral vein was performed.   Limitations:  There is suboptimal visualization of the calf veins due to soft tissue edema and patient's body habitus.   The evaluated veins demonstrate normal compressibility. There is intact venous flow demonstrating normal respiratory variability and normal augmentation of flow with calf compression. Therefore, there is no ultrasonographic evidence for deep vein thrombosis within the evaluated veins.       1.  No sonographic evidence for deep vein thrombosis within the evaluated veins of the left lower extremity from the inguinal region to the popliteal region. Suboptimal visualization of the left calf veins on this exam.     MACRO: None.   Signed by: Shannon Rascon 8/29/2024 3:09 AM Dictation workstation:   [id number]       Cardiac  Catheterization Procedure   Final Result      FL GI esophagram   Final Result   Only a limited esophagram could be performed. No esophageal mass or   obvious mucosal abnormality        Slow clearance through the distal thoracic esophagus as detailed above        MACRO:   None        Signed by: Ru Owen 9/3/2024 12:13 PM   Dictation workstation:   [id number]      CT angio chest for pulmonary embolism   Final Result   LEFT-GREATER-THAN-RIGHT SMALL FREE-FLOWING BILATERAL PLEURAL EFFUSIONS        ONE OR TWO SEGMENTS OF LEFT LOWER LOBE COLLAPSE        NO OTHER ACUTE DISEASE IN THE CHEST             NO ACUTE PULMONARY EMBOLISM THROUGH THE SEGMENTAL BRANCH LEVEL        NO AORTIC DISSECTION OR OTHER ACUTE THORACIC AORTIC FINDINGS        NO AIRSPACE CONSOLIDATION, GROUND-GLASS AIRSPACE DISEASE OR ANY OTHER   SIGN OF ACTIVE INFECTION        NO PERICARDIAL EFFUSION        NO PNEUMOTHORAX        MACRO:   None        Signed by: Ru Owen 8/31/2024 4:22 PM   Dictation workstation:   [id number]      XR knee left 4+ views   Final Result   No acute fracture or malalignment.        Chronic irregularity of the articular surface of the patella and   lateral femoral condyle, likely related to old osteochondral injury   and superimposed degenerative changes.        Grossly stable very large joint effusion.        MACRO:   None        Signed by: Antonia Rucker 8/29/2024 3:40 AM   Dictation workstation:   [id number]      CT angio lower extremity left w and or wo IV contrast   Final Result   No evidence of arterial vascular injury of the left lower extremity.        Large left knee hemarthrosis, not significantly changed from   08/05/2024. No evidence of acute osseous abnormality.        Single vessel left lower extremity runoff, with patency of the   dorsalis pedis artery. Severe stenosis of the left popliteal artery.        Nonspecific left lower extremity subcutaneous edema.        Focal hypodensity in the central uterus  suspicious for endometrial   thickening fluid. Evaluation follow-up nonemergent pelvic ultrasound   is recommended.        MACRO:   Critical Finding:  See findings. Notification was initiated on   8/29/2024 at 3:32 am by  Antonia Rucker.  (**-YCF-**) Instructions:        Signed by: Antonia Rucker 8/29/2024 3:33 AM   Dictation workstation:   [id number]      Lower extremity venous duplex left   Final Result   1.  No sonographic evidence for deep vein thrombosis within the   evaluated veins of the left lower extremity from the inguinal region   to the popliteal region. Suboptimal visualization of the left calf   veins on this exam.             MACRO:   None.        Signed by: Shannon Rascon 8/29/2024 3:09 AM   Dictation workstation:   [id number]      Transthoracic Echo (TTE) Complete    (Results Pending)       No echocardiogram results found for the past 14 days    RADIOLOGY:     Cardiac Catheterization Procedure   Final Result      FL GI esophagram   Final Result   Only a limited esophagram could be performed. No esophageal mass or   obvious mucosal abnormality        Slow clearance through the distal thoracic esophagus as detailed above        MACRO:   None        Signed by: Ru Owen 9/3/2024 12:13 PM   Dictation workstation:   [id number]      CT angio chest for pulmonary embolism   Final Result   LEFT-GREATER-THAN-RIGHT SMALL FREE-FLOWING BILATERAL PLEURAL EFFUSIONS        ONE OR TWO SEGMENTS OF LEFT LOWER LOBE COLLAPSE        NO OTHER ACUTE DISEASE IN THE CHEST             NO ACUTE PULMONARY EMBOLISM THROUGH THE SEGMENTAL BRANCH LEVEL        NO AORTIC DISSECTION OR OTHER ACUTE THORACIC AORTIC FINDINGS        NO AIRSPACE CONSOLIDATION, GROUND-GLASS AIRSPACE DISEASE OR ANY OTHER   SIGN OF ACTIVE INFECTION        NO PERICARDIAL EFFUSION        NO PNEUMOTHORAX        MACRO:   None        Signed by: Ru Owen 8/31/2024 4:22 PM   Dictation workstation:   [id number]      XR knee left 4+ views   Final Result    No acute fracture or malalignment.        Chronic irregularity of the articular surface of the patella and   lateral femoral condyle, likely related to old osteochondral injury   and superimposed degenerative changes.        Grossly stable very large joint effusion.        MACRO:   None        Signed by: Antonia Rucker 8/29/2024 3:40 AM   Dictation workstation:   [id number]      CT angio lower extremity left w and or wo IV contrast   Final Result   No evidence of arterial vascular injury of the left lower extremity.        Large left knee hemarthrosis, not significantly changed from   08/05/2024. No evidence of acute osseous abnormality.        Single vessel left lower extremity runoff, with patency of the   dorsalis pedis artery. Severe stenosis of the left popliteal artery.        Nonspecific left lower extremity subcutaneous edema.        Focal hypodensity in the central uterus suspicious for endometrial   thickening fluid. Evaluation follow-up nonemergent pelvic ultrasound   is recommended.        MACRO:   Critical Finding:  See findings. Notification was initiated on   8/29/2024 at 3:32 am by  Antonia Rucker.  (**-YCF-**) Instructions:        Signed by: Antonia Rucker 8/29/2024 3:33 AM   Dictation workstation:   [id number]      Lower extremity venous duplex left   Final Result   1.  No sonographic evidence for deep vein thrombosis within the   evaluated veins of the left lower extremity from the inguinal region   to the popliteal region. Suboptimal visualization of the left calf   veins on this exam.             MACRO:   None.        Signed by: Shannon Rascon 8/29/2024 3:09 AM   Dictation workstation:   [id number]      Transthoracic Echo (TTE) Complete    (Results Pending)       PROBLEM LIST     Patient Active Problem List   Diagnosis    Hemarthrosis    Sepsis due to cellulitis (Multi)    Current use of long term anticoagulation    History of DVT (deep vein thrombosis)    Hyponatremia    NSTEMI (non-ST  elevated myocardial infarction) (Multi)       ASSESSMENT:   Left lower extremity cellulitis with sepsis  Left knee hemarthrosis  Elevated D-dimer  Hyponatremia  Chest pain symptoms  Tachycardia  Non-ST elevation MI, elevated troponins (600)  Atrial fibrillation, prior history with COVID, remains in SR.  LTAC, Coumadin  Hypertension  Anemia  History of prior deep venous thrombosis, 2021, negative venous duplex 8/20/2024  GERD  Varicose vein history  Breast cancer history of mastectomy 2006  COVID pneumonia January 2021  Degenerative joint disease  Prior ventral hernia repair  Prior appendectomy  Tobacco abuse history  Family history of coronary artery disease        PLAN:     Cardiovascular Supportive Care.  Telemetry Monitoring.  [facility]   Echocardiogram, pending  [facility] for hyponatremia.  CTA chest, rule out pulmonary embolism if primary agrees primary service.  Cardiac catheterization post PCI planned for Tuesday AM.  Coumadin currently being held, INR 1.7 9/1/24, continue to hold, with heparin IV Gtt til cath.  Further Recommendations to Follow.  See Orders.    9/3/24  Tele monitoring  Hold coumadin  Aspirin 81 daily  Lopressor 25 mg p.o. twice daily  Lisinopril 20 mg daily  Add a lipid profile  Left heart catheterization plus or minus PCI today well-informed risk versus benefits would like to proceed with procedure  Further recommendations per Dr Centeno      9/4/24  -Okay to discharge from cardiac standpoint  -Follow-up with Dr. Billy  [facility] 2 weeks  -Will discuss with team regarding Coumadin due to recent falls and having arthrosis of left knee  -Add Imdur 60 mg daily    Pedro Castellanos CNP  [facility]      Of note, this documentation is completed using the Dragon Dictation system (voice recognition software). There may be spelling and/or grammatical errors that were not corrected prior to final submission.    Please do not hesitate to call with  questions.  Electronically signed by JORGE Krishnamurthy-CNP, on 9/4/2024 at 9:21 AM

## 2024-09-04 NOTE — PROGRESS NOTES
"Renal Progress Note  Assessment and Plan:    89 y.o. yo female admitted with cellulitis.  Has normal EF but has some edema.  Tried on lasix in July but na went to 123 and high k.  Says she isn't taking lasix anymore.  Used to be on hydrochlorothiazide but this was dcd 11/2023.  Has edema and pleural effusions and na in low to mid 120s           Plan/  Will reorder serum and urine osmolality and urine sodium as the current working diagnosis is hyponatremia considered mild in a patient clinically euvolemic to decide if the patient qualify for urea tablets we ordered this lab   outpatient follow up from renal standpoint: His/her regular nephrologist    Subjective:   Admit Date: 8/29/2024    Interval History: Patient seen and examined uneventful night no motor or sensory neurological deficit alert oriented follow command in no apparent pain or distress    Medications:   Scheduled Meds:aspirin, 81 mg, oral, Daily  calcium carbonate, 500 mg, oral, Daily  cephalexin, 500 mg, oral, q6h HAILE  famotidine, 20 mg, oral, Daily  isosorbide mononitrate ER, 60 mg, oral, Daily  lactobacillus acidophilus, 1 tablet, oral, BID  lidocaine, 1 patch, transdermal, Daily  lisinopril, 20 mg, oral, Daily  metoprolol tartrate, 25 mg, oral, q12h  polyethylene glycol, 17 g, oral, Daily  sennosides-docusate sodium, 1 tablet, oral, Nightly      Continuous Infusions:     CBC:   Lab Results   Component Value Date    HGB 9.6 (L) 09/04/2024    HGB 8.9 (L) 09/03/2024    WBC 12.6 (H) 09/04/2024    WBC 9.1 09/03/2024     09/04/2024     09/03/2024      Anemia:  No results found for: \"FERRITIN\", \"IRON\", \"TIBC\"   BMP:    Lab Results   Component Value Date     (L) 09/04/2024     (L) 09/03/2024    K 3.9 09/04/2024    K 4.1 09/03/2024    CL 97 (L) 09/04/2024    CL 98 09/03/2024    CO2 25 09/04/2024    CO2 23 09/03/2024    BUN 20 09/04/2024    BUN 19 09/03/2024    CREATININE 0.95 09/04/2024    CREATININE 0.95 09/03/2024      Bone " "disease: No results found for: \"PHOS\", \"PTH\", \"VITD25\"   Urinalysis:  No results found for: \"MONET\", \"PROTUR\", \"GLUCOSEU\", \"BLOODU\", \"KETONESU\", \"BILIRUBINU\", \"NITRITEU\", \"LEUKOCYTESU\", \"UTPCR\"     Objective:   Vitals: BP 98/55 (BP Location: Right arm, Patient Position: Sitting)   Pulse 94   Temp 36.4 °C (97.5 °F) (Temporal)   Resp 19   Ht 1.575 m (5' 2.01\")   Wt 63 kg (139 lb)   SpO2 94%   BMI 25.42 kg/m²    Wt Readings from Last 3 Encounters:   08/29/24 63 kg (139 lb)   08/04/24 64.9 kg (143 lb)      24HR INTAKE/OUTPUT:    Intake/Output Summary (Last 24 hours) at 9/4/2024 1256  Last data filed at 9/4/2024 1150  Gross per 24 hour   Intake 560 ml   Output 301 ml   Net 259 ml     Admission weight:  Weight: 64.5 kg (142 lb 3.2 oz)      Constitutional:  Alert, awake, no apparent distress   Skin:normal, no rash  HEENT:sclera anicteric.  Head atraumatic normocephalic  Neck:supple with no thyromegally  Cardiovascular:  S1, S2 without m/r/g   Respiratory:  CTA B without w/r/r   Abdomen: +bs, soft, nt  Ext: no LE edema  Musculoskeletal:Intact  Neuro:Alert and oriented with no deficit      Electronically signed by Wang Young MD on 9/4/2024 at 12:56 PM            "

## 2024-09-04 NOTE — PROGRESS NOTES
Physical Therapy    Physical Therapy Treatment    Patient Name: Sommer Farmer  MRN: [id number]  Today's Date: 9/4/2024  Time Calculation  Start Time: 2703-1433 resume 5423-9537 split  treatment time   Stop Time: 1053  Time Calculation (min): 19 min         Assessment/Plan   PT Assessment  PT Assessment Results: Decreased strength, Decreased range of motion, Decreased endurance, Impaired balance, Decreased mobility, Pain  End of Session Communication: Bedside nurse  Assessment Comment: Pt is making progress toward goals but does have a vasovagel episode to day limiting participation. Pt will benefit from continued PT services to further progress strength, ROM and functional mobility. Pt and family would like WW to be vended prior to DC if homegoing.  End of Session Patient Position: Up in chair, Alarm on  PT Plan  Inpatient/Swing Bed or Outpatient: Inpatient  PT Plan  Treatment/Interventions: Transfer training, Gait training, Strengthening, Therapeutic exercise  PT Plan: Ongoing PT  PT Frequency: 3 times per week  PT Discharge Recommendations: Moderate intensity level of continued care  Equipment Recommended upon Discharge: Wheeled walker  PT Recommended Transfer Status: Total assist    General Visit Information:   PT  Visit  PT Received On: 09/04/24  General  Reason for Referral: Impaired mobility  Referred By: Francisco  Past Medical History Relevant to Rehab: Includes: Hematoma L knee from fall around 7/28/24, HTN, PVD, OA, CKD, Afib on warfarin, DVT.  Family/Caregiver Present: Yes (Granddaughter present throughout.)  Prior to Session Communication: Bedside nurse  Patient Position Received: Up in chair, Alarm on  General Comment: Pt agreeable to PT services. Pt seen in split treatment from 5779-3301 and again from 8389-4388.    Subjective   Precautions:  Precautions  LE Weight Bearing Status: Weight Bearing as Tolerated (KI in room but pt refusing to wear, states it is more uncomfortable)  Medical  Precautions: Fall precautions, Other (comment)  Braces Applied: KI PRN    Vital Signs (Past 2hrs)        Date/Time Vitals Session Patient Position Pulse Resp SpO2 BP MAP (mmHg)    09/04/24 11:17:25 --  --  94  19  94 %  98/55  74                         Objective   Pain:  Pain Assessment  Pain Assessment: 0-10  0-10 (Numeric) Pain Score: 7  Pain Type: Acute pain  Pain Location: Knee  Pain Orientation: Left  Cognition:  Cognition  Orientation Level: Oriented X4  Coordination:     Postural Control:     Extremity/Trunk Assessments:        Activity Tolerance:  Activity Tolerance  Endurance: Decreased tolerance for upright activites  Treatments:  Therapeutic Exercise  Therapeutic Exercise Performed: Yes (Supine AP, quad set, glute set x10ea BLE. Interupted for nursing to place new IV. Split session.)    Ambulation/Gait Training  Ambulation/Gait Training Performed: Yes (Pt amb with WW andCGA 5' x 2. Pt then has a vasovagel episode with nursing responding immediately, pt moved to chair and arousing after ~2min. Vitals WNL when checked. Pt reports dizzy spells in the past.)  Transfers  Transfer: Yes (Sit>stand transfer withCGA and mod A for stand>sit due to vasovagel episode. Pt using a WW for sit>stand.)    Outcome Measures:  [facility] Basic Mobility  Turning from your back to your side while in a flat bed without using bedrails: A lot  Moving from lying on your back to sitting on the side of a flat bed without using bedrails: A lot  Moving to and from bed to chair (including a wheelchair): A little  Standing up from a chair using your arms (e.g. wheelchair or bedside chair): A little  To walk in hospital room: A little  Climbing 3-5 steps with railing: Total  Basic Mobility - Total Score: 14    Education Documentation  Mobility Training, taught by Kaylyn Eaton PTA at 9/4/2024 12:29 PM.  Learner: Family, Patient  Readiness: Acceptance  Method: Explanation  Response: Verbalizes Understanding    Education Comments  No  comments found.        EDUCATION:  Outpatient Education  Individual(s) Educated: Patient, Caregiver  Education Provided: Body Mechanics, Fall Risk, Home Exercise Program, POC    Encounter Problems       Encounter Problems (Active)       Impaired mobility        Perform all bed mobility with minAx1 (Progressing)       Start:  08/30/24    Expected End:  09/13/24            Perform all transfers with ww and minAx1 (Progressing)       Start:  08/30/24    Expected End:  09/13/24            Patient will ambulate 25 ft with ww and minAx1 (Progressing)       Start:  08/30/24    Expected End:  09/13/24            Patient will perform BLE HEP with supervision to improve strength and L knee ROM  (Progressing)       Start:  08/30/24    Expected End:  09/13/24            Patient will tolerate sitting EOB >/= 5 min with supervision  (Progressing)       Start:  08/30/24    Expected End:  09/13/24               Pain - Adult

## 2024-09-05 ENCOUNTER — APPOINTMENT (OUTPATIENT)
Dept: RADIOLOGY | Facility: HOSPITAL | Age: 89
DRG: 871 | End: 2024-09-05
Payer: MEDICARE

## 2024-09-05 LAB
ANION GAP SERPL CALC-SCNC: 12 MMOL/L (ref 10–20)
BUN SERPL-MCNC: 21 MG/DL (ref 6–23)
CALCIUM SERPL-MCNC: 8.4 MG/DL (ref 8.6–10.3)
CHLORIDE SERPL-SCNC: 96 MMOL/L (ref 98–107)
CO2 SERPL-SCNC: 22 MMOL/L (ref 21–32)
CREAT SERPL-MCNC: 1.02 MG/DL (ref 0.5–1.05)
EGFRCR SERPLBLD CKD-EPI 2021: 52 ML/MIN/1.73M*2
ERYTHROCYTE [DISTWIDTH] IN BLOOD BY AUTOMATED COUNT: 12.5 % (ref 11.5–14.5)
GLUCOSE SERPL-MCNC: 111 MG/DL (ref 74–99)
HCT VFR BLD AUTO: 25.2 % (ref 36–46)
HGB BLD-MCNC: 8.4 G/DL (ref 12–16)
HOLD SPECIMEN: NORMAL
MCH RBC QN AUTO: 29.5 PG (ref 26–34)
MCHC RBC AUTO-ENTMCNC: 33.3 G/DL (ref 32–36)
MCV RBC AUTO: 88 FL (ref 80–100)
NRBC BLD-RTO: 0 /100 WBCS (ref 0–0)
OSMOLALITY UR: 952 MOSM/KG (ref 200–1200)
PLATELET # BLD AUTO: 383 X10*3/UL (ref 150–450)
POTASSIUM SERPL-SCNC: 4.2 MMOL/L (ref 3.5–5.3)
RBC # BLD AUTO: 2.85 X10*6/UL (ref 4–5.2)
SODIUM SERPL-SCNC: 126 MMOL/L (ref 136–145)
WBC # BLD AUTO: 10.4 X10*3/UL (ref 4.4–11.3)

## 2024-09-05 PROCEDURE — 99232 SBSQ HOSP IP/OBS MODERATE 35: CPT | Performed by: STUDENT IN AN ORGANIZED HEALTH CARE EDUCATION/TRAINING PROGRAM

## 2024-09-05 PROCEDURE — 36415 COLL VENOUS BLD VENIPUNCTURE: CPT | Performed by: STUDENT IN AN ORGANIZED HEALTH CARE EDUCATION/TRAINING PROGRAM

## 2024-09-05 PROCEDURE — 2500000001 HC RX 250 WO HCPCS SELF ADMINISTERED DRUGS (ALT 637 FOR MEDICARE OP): Performed by: STUDENT IN AN ORGANIZED HEALTH CARE EDUCATION/TRAINING PROGRAM

## 2024-09-05 PROCEDURE — 1200000002 HC GENERAL ROOM WITH TELEMETRY DAILY

## 2024-09-05 PROCEDURE — 2500000001 HC RX 250 WO HCPCS SELF ADMINISTERED DRUGS (ALT 637 FOR MEDICARE OP): Performed by: INTERNAL MEDICINE

## 2024-09-05 PROCEDURE — 82374 ASSAY BLOOD CARBON DIOXIDE: CPT | Performed by: STUDENT IN AN ORGANIZED HEALTH CARE EDUCATION/TRAINING PROGRAM

## 2024-09-05 PROCEDURE — 74018 RADEX ABDOMEN 1 VIEW: CPT | Performed by: RADIOLOGY

## 2024-09-05 PROCEDURE — 85027 COMPLETE CBC AUTOMATED: CPT | Performed by: STUDENT IN AN ORGANIZED HEALTH CARE EDUCATION/TRAINING PROGRAM

## 2024-09-05 PROCEDURE — 2500000001 HC RX 250 WO HCPCS SELF ADMINISTERED DRUGS (ALT 637 FOR MEDICARE OP): Performed by: NURSE PRACTITIONER

## 2024-09-05 PROCEDURE — 74018 RADEX ABDOMEN 1 VIEW: CPT

## 2024-09-05 PROCEDURE — 2500000004 HC RX 250 GENERAL PHARMACY W/ HCPCS (ALT 636 FOR OP/ED): Performed by: STUDENT IN AN ORGANIZED HEALTH CARE EDUCATION/TRAINING PROGRAM

## 2024-09-05 PROCEDURE — 2500000001 HC RX 250 WO HCPCS SELF ADMINISTERED DRUGS (ALT 637 FOR MEDICARE OP): Performed by: HOSPITALIST

## 2024-09-05 RX ORDER — AMOXICILLIN 250 MG
2 CAPSULE ORAL 2 TIMES DAILY
Status: DISCONTINUED | OUTPATIENT
Start: 2024-09-05 | End: 2024-09-09 | Stop reason: HOSPADM

## 2024-09-05 RX ORDER — PROCHLORPERAZINE EDISYLATE 5 MG/ML
10 INJECTION INTRAMUSCULAR; INTRAVENOUS EVERY 6 HOURS PRN
Status: DISCONTINUED | OUTPATIENT
Start: 2024-09-05 | End: 2024-09-09 | Stop reason: HOSPADM

## 2024-09-05 RX ORDER — PROCHLORPERAZINE MALEATE 5 MG
10 TABLET ORAL EVERY 6 HOURS PRN
Status: DISCONTINUED | OUTPATIENT
Start: 2024-09-05 | End: 2024-09-09 | Stop reason: HOSPADM

## 2024-09-05 RX ORDER — PROCHLORPERAZINE 25 MG/1
25 SUPPOSITORY RECTAL EVERY 12 HOURS PRN
Status: DISCONTINUED | OUTPATIENT
Start: 2024-09-05 | End: 2024-09-09 | Stop reason: HOSPADM

## 2024-09-05 RX ORDER — LACTULOSE 10 G/15ML
30 SOLUTION ORAL ONCE
Status: COMPLETED | OUTPATIENT
Start: 2024-09-05 | End: 2024-09-05

## 2024-09-05 RX ORDER — BISACODYL 10 MG/1
10 SUPPOSITORY RECTAL DAILY
Status: DISCONTINUED | OUTPATIENT
Start: 2024-09-05 | End: 2024-09-09 | Stop reason: HOSPADM

## 2024-09-05 ASSESSMENT — PAIN SCALES - GENERAL
PAINLEVEL_OUTOF10: 7
PAINLEVEL_OUTOF10: 7
PAINLEVEL_OUTOF10: 5 - MODERATE PAIN
PAINLEVEL_OUTOF10: 7
PAINLEVEL_OUTOF10: 10 - WORST POSSIBLE PAIN

## 2024-09-05 ASSESSMENT — PAIN DESCRIPTION - ORIENTATION: ORIENTATION: LOWER;UPPER

## 2024-09-05 ASSESSMENT — COGNITIVE AND FUNCTIONAL STATUS - GENERAL
DRESSING REGULAR LOWER BODY CLOTHING: A LITTLE
MOBILITY SCORE: 17
MOBILITY SCORE: 13
TURNING FROM BACK TO SIDE WHILE IN FLAT BAD: A LITTLE
STANDING UP FROM CHAIR USING ARMS: A LITTLE
MOVING FROM LYING ON BACK TO SITTING ON SIDE OF FLAT BED WITH BEDRAILS: A LITTLE
DRESSING REGULAR LOWER BODY CLOTHING: A LITTLE
STANDING UP FROM CHAIR USING ARMS: A LOT
TOILETING: A LITTLE
TOILETING: A LITTLE
TURNING FROM BACK TO SIDE WHILE IN FLAT BAD: A LITTLE
HELP NEEDED FOR BATHING: A LITTLE
WALKING IN HOSPITAL ROOM: A LITTLE
CLIMB 3 TO 5 STEPS WITH RAILING: A LOT
MOVING TO AND FROM BED TO CHAIR: A LOT
DAILY ACTIVITIY SCORE: 21
MOVING TO AND FROM BED TO CHAIR: A LITTLE
WALKING IN HOSPITAL ROOM: A LOT
PERSONAL GROOMING: A LITTLE
CLIMB 3 TO 5 STEPS WITH RAILING: TOTAL
DRESSING REGULAR UPPER BODY CLOTHING: A LITTLE
MOVING FROM LYING ON BACK TO SITTING ON SIDE OF FLAT BED WITH BEDRAILS: A LITTLE
HELP NEEDED FOR BATHING: A LITTLE
DAILY ACTIVITIY SCORE: 19

## 2024-09-05 ASSESSMENT — PAIN - FUNCTIONAL ASSESSMENT
PAIN_FUNCTIONAL_ASSESSMENT: 0-10

## 2024-09-05 ASSESSMENT — PAIN DESCRIPTION - DESCRIPTORS
DESCRIPTORS: BURNING;ACHING
DESCRIPTORS: ACHING
DESCRIPTORS: CRAMPING
DESCRIPTORS: ACHING;BURNING

## 2024-09-05 ASSESSMENT — PAIN DESCRIPTION - LOCATION: LOCATION: ABDOMEN

## 2024-09-05 NOTE — PROGRESS NOTES
Received message from [facility] requesting 1. [facility] 2. [facility].  [facility].  Pt. With medicare, will not require ins. Precert.    Update:  [facility] has accepted pt. Can discharge when ready/medicare.  [facility] and physician notified.

## 2024-09-05 NOTE — CARE PLAN
Problem: Safety - Adult  Goal: Free from fall injury  Outcome: Progressing     Problem: Discharge Planning  Goal: Discharge to home or other facility with appropriate resources  Outcome: Progressing     Problem: Chronic Conditions and Co-morbidities  Goal: Patient's chronic conditions and co-morbidity symptoms are monitored and maintained or improved  Outcome: Progressing     Problem: Skin  Goal: Decreased wound size/increased tissue granulation at next dressing change  Outcome: Progressing  Goal: Participates in plan/prevention/treatment measures  Outcome: Progressing  Goal: Prevent/manage excess moisture  Outcome: Progressing  Goal: Prevent/minimize sheer/friction injuries  Outcome: Progressing  Goal: Promote/optimize nutrition  Outcome: Progressing  Goal: Promote skin healing  Outcome: Progressing     Problem: Pain  Goal: Takes deep breaths with improved pain control throughout the shift  Outcome: Progressing  Goal: Turns in bed with improved pain control throughout the shift  Outcome: Progressing  Goal: Walks with improved pain control throughout the shift  Outcome: Progressing  Goal: Performs ADL's with improved pain control throughout shift  Outcome: Progressing  Goal: Participates in PT with improved pain control throughout the shift  Outcome: Progressing  Goal: Free from opioid side effects throughout the shift  Outcome: Progressing  Goal: Free from acute confusion related to pain meds throughout the shift  Outcome: Progressing   The patient's goals for the shift include      The clinical goals for the shift include pt will remain hemodynamically stable throughout shift.

## 2024-09-05 NOTE — PROGRESS NOTES
Occupational Therapy                 Therapy Communication Note    Patient Name: Sommer Farmer  MRN: [id number]  Today's Date: 9/5/2024     Discipline: Occupational Therapy    Missed Visit Reason: Missed Visit Reason:  (Attempted OT tx x2; 12:05 pt eating lunch, 13:53 pt receiving nsg care.  Will reattempt as schedule allows.)

## 2024-09-05 NOTE — CONSULTS
Infectious Disease    Patient Name: Sommer Farmer  Date: 9/5/2024  YOB: 1933  Medical Record Number: [id number]        Chief Complaint   Patient presents with    Knee Pain     Left knee pain and swelling that began today. Pt denies injury         History of Present Illness:   Patient 89-year-old female presenting with left knee pain.  She had a hematoma her left knee for 3 weeks previously was improving and then worsening.  She had some pain in her left calf became more swollen more red.  She is feeling better now.  Almost resolution of the erythema present.  The left knee pain had improved a CT scan had showed large left knee hemarthrosis which was similar to present.  Aspiration was attempted but could not take place, as per orthopedics.      Review of Systems: All other ROS reviewed and are negative other than as stated in HPI            Social History     Tobacco Use    Smoking status: Never    Smokeless tobacco: Never   Substance Use Topics    Alcohol use: Never    Drug use: Never         History reviewed. No pertinent past medical history.        Past Surgical History:   Procedure Laterality Date    CARDIAC CATHETERIZATION N/A 9/3/2024    Procedure: Left Heart Cath;  Surgeon: Wicho Pizano MD;  Location: [facility];  Service: Cardiovascular;  Laterality: N/A;  and PCI if needed.    CT ABDOMEN PELVIS ANGIOGRAM W AND/OR WO IV CONTRAST  9/8/2019    CT ABDOMEN PELVIS ANGIOGRAM W AND/OR WO IV CONTRAST 9/8/2019 [facility]           Current Facility-Administered Medications:     acetaminophen (Tylenol) tablet 650 mg, 650 mg, oral, q4h PRN, 650 mg at 08/30/24 2126 **OR** acetaminophen (Tylenol) oral liquid 650 mg, 650 mg, nasogastric tube, q4h PRN **OR** acetaminophen (Tylenol) suppository 650 mg, 650 mg, rectal, q4h PRN, Kojo Jasso MD    apixaban (Eliquis) tablet 2.5 mg, 2.5 mg, oral, q12h, Vinicius Mckinney MD, 2.5 mg at 09/05/24 0257    aspirin chewable tablet 81  mg, 81 mg, oral, Daily, YOBANY Souza, 81 mg at 09/04/24 0812    benzonatate (Tessalon) capsule 200 mg, 200 mg, oral, TID PRN, YOBANY Souza, 200 mg at 08/30/24 0131    calcium carbonate (Tums) chewable tablet 500 mg, 500 mg, oral, Daily, Genny Singh MD, 500 mg at 09/04/24 0810    calcium carbonate (Tums) chewable tablet 500 mg, 500 mg, oral, 4x daily PRN, Genny Singh MD    cephalexin (Keflex) capsule 500 mg, 500 mg, oral, q6h HAILE, Vinicius Mckinney MD, 500 mg at 09/05/24 0550    famotidine (Pepcid) tablet 20 mg, 20 mg, oral, Daily, Genny Singh MD, 20 mg at 09/04/24 0811    guaiFENesin (Mucinex) 12 hr tablet 600 mg, 600 mg, oral, BID PRN, Genny Singh MD, 600 mg at 09/02/24 0852    HYDROmorphone (Dilaudid) injection 0.6 mg, 0.6 mg, intravenous, q4h PRN, Nola Dinero MD, 0.6 mg at 09/04/24 2242    isosorbide mononitrate ER (Imdur) 24 hr tablet 30 mg, 30 mg, oral, Daily, Vinicius Mckinney MD    lactobacillus acidophilus tablet 1 tablet, 1 tablet, oral, BID, Genny Singh MD, 1 tablet at 09/04/24 2000    lisinopril tablet 20 mg, 20 mg, oral, Daily, YOBANY Souza, 20 mg at 09/04/24 0810    melatonin tablet 3 mg, 3 mg, oral, Nightly PRN, Kojo Jasso MD, 3 mg at 09/01/24 2026    metoprolol tartrate (Lopressor) tablet 25 mg, 25 mg, oral, q12h, YOBANY Souza, 25 mg at 09/04/24 1952    ondansetron (Zofran) tablet 4 mg, 4 mg, oral, q8h PRN **OR** ondansetron (Zofran) injection 4 mg, 4 mg, intravenous, q8h PRN, Kojo Jasso MD, 4 mg at 08/29/24 2016    oxyCODONE (Roxicodone) immediate release tablet 2.5 mg, 2.5 mg, oral, q4h PRN, YOBANY Bliss, 2.5 mg at 08/31/24 0441    oxyCODONE (Roxicodone) immediate release tablet 5 mg, 5 mg, oral, q4h PRN, YOBANY Bliss, 5 mg at 09/04/24 1952    polyethylene glycol (Glycolax, Miralax) packet 17 g, 17 g, oral, Daily, Kojo  "GREGG Jasso MD, 17 g at 09/04/24 0812    sennosides-docusate sodium (Sarita-Colace) 8.6-50 mg per tablet 1 tablet, 1 tablet, oral, Nightly, Anthony Granger, APRN-CNP, 1 tablet at 09/04/24 2000     Allergies   Allergen Reactions    Proton Pump Inhibitors Rash     protonix    gas symptoms    Tamoxifen Rash and Unknown          No family history on file.      Physical Exam:    Blood pressure 130/61, pulse 90, temperature 37.5 °C (99.5 °F), resp. rate 18, height 1.575 m (5' 2.01\"), weight 63 kg (139 lb), SpO2 96%.  General: Patient appears ok at the present time. NAD  Skin: no new rashes  HEENT:  Neck is supple, No subconjunctival hemorrhages, no oral exudates  Heart: S1 S2  Lungs: clear bilaterally  Abdomen: soft, ND, NTTP,  Back :no CVA tenderness  Extrem: No edema, non tender no erythema left lower  Neuro exam: CN II-XII intact  Psych: cooperative    Labs:  I have reviewed all lab results by electronic record, including most recent CBC, metabolic panel, and pertinent abnormalities were addressed from an infectious disease perspective.  Trends are being monitored over time.    Lab Results   Component Value Date    WBC 10.4 09/05/2024    HGB 8.4 (L) 09/05/2024    HCT 25.2 (L) 09/05/2024    MCV 88 09/05/2024     09/05/2024     Lab Results   Component Value Date    GLUCOSE 111 (H) 09/05/2024    CALCIUM 8.4 (L) 09/05/2024     (L) 09/05/2024    K 4.2 09/05/2024    CO2 22 09/05/2024    CL 96 (L) 09/05/2024    BUN 21 09/05/2024    CREATININE 1.02 09/05/2024       Radiology:  I have reviewed imaging results per electronic record and most pertinent abnormalities are being addressed from an infectious disease standpoint.            ASSESSMENT:  Problem List Items Addressed This Visit          Cardiac and Vasculature    NSTEMI (non-ST elevated myocardial infarction) (Multi)    Relevant Medications    metoprolol tartrate (Lopressor) tablet 25 mg    isosorbide mononitrate ER (Imdur) 60 mg 24 hr tablet    isosorbide " mononitrate ER (Imdur) 24 hr tablet 30 mg (Start on 9/5/2024  9:00 AM)    Other Relevant Orders    Case Request Cath Lab: Left Heart Cath, Angioplasty - Coronary (Completed)    Cardiac Catheterization Procedure (Completed)       Coag and Thromboembolic    Current use of long term anticoagulation    Relevant Orders    Case Request Cath Lab: Left Heart Cath, Angioplasty - Coronary (Completed)    Cardiac Catheterization Procedure (Completed)    History of DVT (deep vein thrombosis)    Relevant Orders    Case Request Cath Lab: Left Heart Cath, Angioplasty - Coronary (Completed)    Cardiac Catheterization Procedure (Completed)       Endocrine/Metabolic    Hyponatremia    Relevant Orders    Case Request Cath Lab: Left Heart Cath, Angioplasty - Coronary (Completed)    Cardiac Catheterization Procedure (Completed)       Infectious Diseases    * (Principal) Sepsis due to cellulitis (Multi)    Relevant Orders    Case Request Cath Lab: Left Heart Cath, Angioplasty - Coronary (Completed)    Cardiac Catheterization Procedure (Completed)       Musculoskeletal and Injuries    Hemarthrosis - Primary     Other Visit Diagnoses       Cellulitis of left lower extremity        Sepsis without acute organ dysfunction, due to unspecified organism (Multi)                   PLAN:  Patient already on oral therapy transition which is okay will complete total of 10 days for cellulitis.  Patient should follow-up with orthopedics regarding left knee if things worsen off antibiotics this would suggest an infection prior here, but seems less likely with information we have

## 2024-09-05 NOTE — PROGRESS NOTES
"Nutrition Follow Up Assessment:   Nutrition Assessment         Patient is a 89 y.o. female presenting with sepsis due to cellulitis      Nutrition History:  Energy Intake: Poor < 50 %  Food and Nutrient History: RD follow-up. Pt reports continued poor appetite. Reports abdominal pain, though endorsing constipation. Is on cardiac diet, though d/t limited intakes will change to regular diet. Wishing to change oral nutritional supplements to Ensure daily and Magic Cup daily. Family at bedside, encouraged to bring food from home to promote intakes.  Food Allergies/Intolerances:  None  GI Symptoms: Constipation and Abdominal pain  Oral Problems:  denies       Anthropometrics:  Height: 157.5 cm (5' 2.01\")   Weight: 63 kg (139 lb)   BMI (Calculated): 25.42  IBW/kg (Dietitian Calculated): 50 kg          Weight Change %:  Weight History / % Weight Change: no new wt to review    Nutrition Focused Physical Exam Findings:  defer: previously completed    Edema:  Edema: +1 trace  Edema Location: non-pitting RLE, 1+ LLE per nursing assessment  Physical Findings:  Skin: Negative (for pressure injuries)    Nutrition Significant Labs:  BMP Trend:   Results from last 7 days   Lab Units 09/05/24  0412 09/04/24  0353 09/03/24  0353 09/02/24  0411   GLUCOSE mg/dL 111* 85  102* 97 97   CALCIUM mg/dL 8.4* 9.1  9.0 8.5* 8.8   SODIUM mmol/L 126* 131*  129* 129* 130*   POTASSIUM mmol/L 4.2 4.0  3.9 4.1 4.1   CO2 mmol/L 22 20*  25 23 24   CHLORIDE mmol/L 96* 97*  97* 98 99   BUN mg/dL 21 20  20 19 18   CREATININE mg/dL 1.02 1.00  0.95 0.95 0.86        Nutrition Specific Medications:  Reviewed    I/O:   Last BM Date: 08/29/24;      Dietary Orders (From admission, onward)       Start     Ordered    09/05/24 0959  Adult diet Regular  Diet effective now        Question:  Diet type  Answer:  Regular    09/05/24 0958    09/05/24 0956  Oral nutritional supplements  Until discontinued        Comments: strawberry   Question Answer Comment "   Deliver with Breakfast    Select supplement: Ensure Plus High Protein        09/05/24 0955    09/05/24 0956  Oral nutritional supplements  Until discontinued        Comments: Butter pecan   Question Answer Comment   Deliver with Dinner    Select supplement: Magic Cup        09/05/24 0955                     Estimated Needs:   Total Energy Estimated Needs (kCal): 1575 kCal  Method for Estimating Needs: 25 kcal/kg ABW  Total Protein Estimated Needs (g): 50 g  Method for Estimating Needs: 0.8 g/kg ABW  Total Fluid Estimated Needs (mL): 1575 mL  Method for Estimating Needs: 1 ml/kcal or per MD        Nutrition Diagnosis   Malnutrition Diagnosis  Patient has Malnutrition Diagnosis: No    Nutrition Diagnosis  Patient has Nutrition Diagnosis: Yes  Diagnosis Status (1): Ongoing  Nutrition Diagnosis 1: Inadequate oral intake  Related to (1): stomach aches, reflux  As Evidenced by (1): reports of eating less x ~1 week       Nutrition Interventions/Recommendations         Nutrition Prescription:  Individualized Nutrition Prescription Provided for : Regular diet with oral nutritional supplements        Nutrition Interventions:   Interventions: Meals and snacks, Medical food supplement  Goal: Consumes 3 meals per day  Medical Food Supplement: Commercial beverage  Goal: Ensure Plus High Protein daily (provides 350 kcal, 20 g protein per serving). Magic cup daily (290 kcal, 9 g protein per serving).    Collaboration and Referral of Nutrition Care: Other (Comment), Collaboration by nutrition professional with other providers  Goal: RN; family at bedside    Nutrition Education:      Encouraged PO intakes and consumption of oral nutritional supplements between meals.    Nutrition Monitoring and Evaluation   Food/Nutrient Related History Monitoring  Monitoring and Evaluation Plan: Energy intake, Amount of food, Fluid intake  Energy Intake: Estimated energy intake  Criteria: Pt meets >75% of estimated energy needs - ongoing  Fluid  Intake: Estimated fluid intake  Criteria: Meets >75% of estimated fluid needs - ongoing  Amount of Food: Estimated amout of food, Medical food intake  Criteria: Pt consumes >50% of meals and supplements - ongoing    Body Composition/Growth/Weight History  Monitoring and Evaluation Plan: Weight  Weight: Measured weight  Criteria: Maintains stable weight - no new wt to review    Biochemical Data, Medical Tests and Procedures  Monitoring and Evaluation Plan: Glucose/endocrine profile, Electrolyte/renal panel  Electrolyte and Renal Panel: Sodium, Potassium, Phosphorus  Criteria: Electrolytes WNL - not met, sodium low  Glucose/Endocrine Profile: Glucose, casual  Criteria: BG within desirable range- met    Nutrition Focused Physical Findings  Monitoring and Evaluation Plan: Digestive System  Digestive System: Constipation, Other (Comment) (abdominal pain)  Criteria: Improvement in GI symptoms         Time Spent (min): 30 minutes

## 2024-09-05 NOTE — PROGRESS NOTES
PROGRESS NOTE    ASSESSMENT AND PLAN:     #. Troponin elevation  #. Chest pain  -ECG shows no acute ischemic changes  -Troponin was elevated at 667, trending down.  -CT angio showed negative for pulmonary embolism, shows pleural effusion  -S/p LHC on 9/3. 95% stenosis in the mid LAD with MARIANO I flow and collaterals noted, 50% stenosis in the diagonal artery, 50% stenosis in the dominant mid RCA and an LVEF of 65%.     Plan:  -Continue aspirin  -Cards following, recs appreciated. Continued medical management advised post [facility].   -Cont Imdur 30 mg daily     #.  Sepsis secondary to left lower extremity cellulitis  -Presented with leukocytosis and tachycardia, diffuse erythema of left lower extremity without drainage  -Seen by infectious disease.  -ancef dc'd as IV access lost. Was continued on PO keflex. To complete 10 day atb course.   -Cellulitis, overall improved    Abdominal pain/constipation  -Symptoms worsened today. Abd x-ray obtained. Revealed gas-filled, barium coated, mildly dilated colon.   -Bowel regimen escalated and soap suds enema ordered.      #. Gastroesophageal reflux disease  -Continues to have chest pain with eating.  -GI following, recs appreciated. Plan for outpatient EGD. Advise continued supportive care & famotidine 20 mg daily  -S/p esophogram which was negative for mass/mucosal abnormality but did note slowed clearance through distal thoracic esophagus.   -Patient is allergic to PPIs, continue famotidine and Tums as needed.    #.  Left knee pain 2/2 hemarthrosis of left knee  -Likely induced by trauma while on Coumadin, Doppler negative for DVT  -CT scan shows a large left knee hemarthrosis not significantly changed from 8/5  -Orthopedic surgery consult, attempted an aspiration but were unable to aspirate.  -Overall improving    #.  Acute hypoxic respiratory failure  #.  Pleural effusion  -symptoms stable    #.  Hyponatremia  -Status post 1 dose of tolvaptan  -Serum sodium dropped overnight. Na  "131 -> 126.  -Nephro following, recs appreciated. 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 Will place on fluid restricted diet    #.  History of DVT in 2021  #   Paroxysmal Afib  #.  Long-term anticoagulation  -Doppler negative for DVT  -Supratherapeutic INR on presentation, now resolved.  -It appears that her DVT was provoked in 2021 when she had COVID-19.  Family will check with her doctor to see if she has any hypercoagulable disease.  If not patient can be discontinued off anticoagulation given that this is her first provoked DVT, and she has a history of multiple falls.    -Will continue on low dose eliquis given risk of falls and hx of afib.     Disposition: Care coordinators arranging DC to SNF, likely on 9/5/24    SUBJECTIVE:   Admit Date: 8/29/2024    Patient no acute events overnight.  Endorsing constipation, abdominal discomfort, and nausea.  Reports no BM for the past week.  Denies any fevers chills or chest pain.  Further ROS was unremarkable.    OBJECTIVE:   Vitals: /58 (BP Location: Right leg)   Pulse (!) 112   Temp 36.6 °C (97.9 °F) (Tympanic)   Resp 17   Ht 1.575 m (5' 2.01\")   Wt 63 kg (139 lb)   SpO2 96%   BMI 25.42 kg/m²    Wt Readings from Last 3 Encounters:   08/29/24 63 kg (139 lb)   08/04/24 64.9 kg (143 lb)      24HR INTAKE/OUTPUT:    Intake/Output Summary (Last 24 hours) at 9/5/2024 1439  Last data filed at 9/5/2024 1149  Gross per 24 hour   Intake 388 ml   Output 200 ml   Net 188 ml       PHYSICAL EXAM:   GENERAL: Laying in bed, does not appear to be in any distress.   HEENT: HEAD: Normocephalic atraumatic.  Neck: Supple.  Eyes: Pupils are reactive to direct light.   CVS: S1, S2 heard. Regular rate and rhythm  LUNGS: Clear to auscultate bilaterally. No wheezing or rhonchi appreciated.  ABDOMEN: Soft, nontender to palpate. Positive bowel sounds. No guarding or rebound appreciated.  NEUROLOGICAL: No focal neurological deficits appreciated. Cranial nerves are grossly intact.  EXTREMITIES: Left " "knee covered in clean bandaging.   SKIN:  Grossly intact, warm and dry.      LABS/IMAGING AND MEDICATIONS:   Scheduled Meds:apixaban, 2.5 mg, oral, q12h  aspirin, 81 mg, oral, Daily  calcium carbonate, 500 mg, oral, Daily  cephalexin, 500 mg, oral, q6h HAILE  famotidine, 20 mg, oral, Daily  isosorbide mononitrate ER, 30 mg, oral, Daily  lactobacillus acidophilus, 1 tablet, oral, BID  lisinopril, 20 mg, oral, Daily  metoprolol tartrate, 25 mg, oral, q12h  polyethylene glycol, 17 g, oral, Daily  sennosides-docusate sodium, 2 tablet, oral, BID  urea, 15 g, oral, Daily      PRN Meds:PRN medications: acetaminophen **OR** acetaminophen **OR** acetaminophen, benzonatate, calcium carbonate, guaiFENesin, HYDROmorphone, melatonin, ondansetron **OR** ondansetron, oxyCODONE, oxyCODONE    No lab exists for component: \"CBC\"   No lab exists for component: \"CMP\"   No lab exists for component: \"TROPONIN\"      Results from last 7 days   Lab Units 09/03/24  0353   INR  1.3*     No lab exists for component: \"LIPIDS\"       No lab exists for component: \"URINALYSIS\"          BMP:  Results from last 7 days   Lab Units 09/05/24 0412 09/04/24  0353 09/03/24  0353   SODIUM mmol/L 126* 131*  129* 129*   POTASSIUM mmol/L 4.2 4.0  3.9 4.1   CHLORIDE mmol/L 96* 97*  97* 98   CO2 mmol/L 22 20*  25 23   BUN mg/dL 21 20  20 19   CREATININE mg/dL 1.02 1.00  0.95 0.95       CBC:  Results from last 7 days   Lab Units 09/05/24 0412 09/04/24  0353 09/03/24  0353   WBC AUTO x10*3/uL 10.4 12.6* 9.1   RBC AUTO x10*6/uL 2.85* 3.27* 2.97*   HEMOGLOBIN g/dL 8.4* 9.6* 8.9*   HEMATOCRIT % 25.2* 29.2* 26.3*   MCV fL 88 89 89   MCH pg 29.5 29.4 30.0   MCHC g/dL 33.3 32.9 33.8   RDW % 12.5 12.6 12.6   PLATELETS AUTO x10*3/uL 383 407 351       Cardiac Enzymes:   Results from last 7 days   Lab Units 09/02/24  0411 09/01/24  0410 08/31/24  0922   TROPHS ng/L 211* 602* 674*   CK TOTAL U/L  --   --  54         Hepatic Function Panel:        No lab exists for " "component: \"LABALBU\"      Magnesium:  Results from last 7 days   Lab Units 09/03/24  0353   MAGNESIUM mg/dL 1.75       Pro-BNP:  No results found for: \"PROBNP\"    INR:  Results from last 7 days   Lab Units 09/03/24  0353 09/01/24  1311 09/01/24  0753   PROTIME seconds 15.1* 18.2* 19.6*   INR  1.3* 1.6* 1.7*       TSH:  No results found for: \"TSH\"      Lipid Profile:  Results from last 7 days   Lab Units 09/03/24  0353   TRIGLYCERIDES mg/dL 82   HDL mg/dL 43.6   LDL CALC mg/dL 71       HgbA1C:        Lactate Level:  No lab exists for component: \"LACTA\"    CMP:  Results from last 7 days   Lab Units 09/05/24  0412 09/04/24  0353 09/03/24  0353   SODIUM mmol/L 126* 131*  129* 129*   POTASSIUM mmol/L 4.2 4.0  3.9 4.1   CHLORIDE mmol/L 96* 97*  97* 98   CO2 mmol/L 22 20*  25 23   BUN mg/dL 21 20  20 19   CREATININE mg/dL 1.02 1.00  0.95 0.95   GLUCOSE mg/dL 111* 85  102* 97   CALCIUM mg/dL 8.4* 9.1  9.0 8.5*       Amylase:        Lipase:        ABG:        No lab exists for component: \"PO2\", \"PCO2\", \"HCO3\", \"BE\", \"O2SAT\"       "

## 2024-09-05 NOTE — PROGRESS NOTES
"Renal Progress Note  Assessment and Plan:    89 y.o. yo female admitted with cellulitis.  Has normal EF but has some edema.  Tried on lasix in July but na went to 123 and high k.  Says she isn't taking lasix anymore.  Used to be on hydrochlorothiazide but this was dcd 11/2023.  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     Plan/  Will order urea powder  Subjective:   Admit Date: 8/29/2024    Interval History: Patient seen and examined uneventful night complaining from diffuse abdominal discomfort constipated otherwise alert no motor or sensory neurological deficit      Medications:   Scheduled Meds:apixaban, 2.5 mg, oral, q12h  aspirin, 81 mg, oral, Daily  calcium carbonate, 500 mg, oral, Daily  cephalexin, 500 mg, oral, q6h HAILE  famotidine, 20 mg, oral, Daily  isosorbide mononitrate ER, 30 mg, oral, Daily  lactobacillus acidophilus, 1 tablet, oral, BID  lisinopril, 20 mg, oral, Daily  metoprolol tartrate, 25 mg, oral, q12h  polyethylene glycol, 17 g, oral, Daily  sennosides-docusate sodium, 2 tablet, oral, BID      Continuous Infusions:     CBC:   Lab Results   Component Value Date    HGB 8.4 (L) 09/05/2024    HGB 9.6 (L) 09/04/2024    WBC 10.4 09/05/2024    WBC 12.6 (H) 09/04/2024     09/05/2024     09/04/2024      Anemia:  No results found for: \"FERRITIN\", \"IRON\", \"TIBC\"   BMP:    Lab Results   Component Value Date     (L) 09/05/2024     (L) 09/04/2024     (L) 09/04/2024    K 4.2 09/05/2024    K 3.9 09/04/2024    K 4.0 09/04/2024    CL 96 (L) 09/05/2024    CL 97 (L) 09/04/2024    CL 97 (L) 09/04/2024    CO2 22 09/05/2024    CO2 25 09/04/2024    CO2 20 (L) 09/04/2024    BUN 21 09/05/2024    BUN 20 09/04/2024    BUN 20 09/04/2024    CREATININE 1.02 09/05/2024    CREATININE 0.95 09/04/2024    CREATININE 1.00 09/04/2024      Bone disease:   Lab Results   Component " "Value Date    PHOS 2.9 09/04/2024      Urinalysis:  No results found for: \"MONET\", \"PROTUR\", \"GLUCOSEU\", \"BLOODU\", \"KETONESU\", \"BILIRUBINU\", \"NITRITEU\", \"LEUKOCYTESU\", \"UTPCR\"     Objective:   Vitals: /58 (BP Location: Right leg)   Pulse (!) 112   Temp 36.6 °C (97.9 °F) (Tympanic)   Resp 17   Ht 1.575 m (5' 2.01\")   Wt 63 kg (139 lb)   SpO2 96%   BMI 25.42 kg/m²    Wt Readings from Last 3 Encounters:   08/29/24 63 kg (139 lb)   08/04/24 64.9 kg (143 lb)      24HR INTAKE/OUTPUT:    Intake/Output Summary (Last 24 hours) at 9/5/2024 1254  Last data filed at 9/5/2024 1149  Gross per 24 hour   Intake 388 ml   Output 200 ml   Net 188 ml     Admission weight:  Weight: 64.5 kg (142 lb 3.2 oz)      Constitutional:  Alert, awake, no apparent distress   Skin:normal, no rash  HEENT:sclera anicteric.  Head atraumatic normocephalic  Neck:supple with no thyromegally  Cardiovascular:  S1, S2 without m/r/g   Respiratory:  CTA B without w/r/r   Abdomen: +bs, soft, nt  Ext: no LE edema  Musculoskeletal:Intact  Neuro:Alert and oriented with no deficit      Electronically signed by Wang Young MD on 9/5/2024 at 12:54 PM            "

## 2024-09-06 LAB
ANION GAP SERPL CALC-SCNC: 14 MMOL/L (ref 10–20)
BUN SERPL-MCNC: 27 MG/DL (ref 6–23)
CALCIUM SERPL-MCNC: 9.2 MG/DL (ref 8.6–10.3)
CHLORIDE SERPL-SCNC: 95 MMOL/L (ref 98–107)
CO2 SERPL-SCNC: 21 MMOL/L (ref 21–32)
CREAT SERPL-MCNC: 1.36 MG/DL (ref 0.5–1.05)
EGFRCR SERPLBLD CKD-EPI 2021: 37 ML/MIN/1.73M*2
ERYTHROCYTE [DISTWIDTH] IN BLOOD BY AUTOMATED COUNT: 12.3 % (ref 11.5–14.5)
GLUCOSE SERPL-MCNC: 128 MG/DL (ref 74–99)
HCT VFR BLD AUTO: 28 % (ref 36–46)
HGB BLD-MCNC: 9.5 G/DL (ref 12–16)
HOLD SPECIMEN: NORMAL
MCH RBC QN AUTO: 29.7 PG (ref 26–34)
MCHC RBC AUTO-ENTMCNC: 33.9 G/DL (ref 32–36)
MCV RBC AUTO: 88 FL (ref 80–100)
NRBC BLD-RTO: 0 /100 WBCS (ref 0–0)
PLATELET # BLD AUTO: 477 X10*3/UL (ref 150–450)
POTASSIUM SERPL-SCNC: 3.8 MMOL/L (ref 3.5–5.3)
RBC # BLD AUTO: 3.2 X10*6/UL (ref 4–5.2)
SODIUM SERPL-SCNC: 126 MMOL/L (ref 136–145)
WBC # BLD AUTO: 12.3 X10*3/UL (ref 4.4–11.3)

## 2024-09-06 PROCEDURE — 2500000004 HC RX 250 GENERAL PHARMACY W/ HCPCS (ALT 636 FOR OP/ED): Performed by: STUDENT IN AN ORGANIZED HEALTH CARE EDUCATION/TRAINING PROGRAM

## 2024-09-06 PROCEDURE — 2500000001 HC RX 250 WO HCPCS SELF ADMINISTERED DRUGS (ALT 637 FOR MEDICARE OP)

## 2024-09-06 PROCEDURE — 97530 THERAPEUTIC ACTIVITIES: CPT | Mod: GO,CO

## 2024-09-06 PROCEDURE — 1200000002 HC GENERAL ROOM WITH TELEMETRY DAILY

## 2024-09-06 PROCEDURE — 97535 SELF CARE MNGMENT TRAINING: CPT | Mod: GO,CO

## 2024-09-06 PROCEDURE — 2500000001 HC RX 250 WO HCPCS SELF ADMINISTERED DRUGS (ALT 637 FOR MEDICARE OP): Performed by: STUDENT IN AN ORGANIZED HEALTH CARE EDUCATION/TRAINING PROGRAM

## 2024-09-06 PROCEDURE — 99232 SBSQ HOSP IP/OBS MODERATE 35: CPT | Performed by: STUDENT IN AN ORGANIZED HEALTH CARE EDUCATION/TRAINING PROGRAM

## 2024-09-06 PROCEDURE — 2500000001 HC RX 250 WO HCPCS SELF ADMINISTERED DRUGS (ALT 637 FOR MEDICARE OP): Performed by: NURSE PRACTITIONER

## 2024-09-06 PROCEDURE — 97110 THERAPEUTIC EXERCISES: CPT | Mod: GP,CQ

## 2024-09-06 PROCEDURE — 80048 BASIC METABOLIC PNL TOTAL CA: CPT | Performed by: STUDENT IN AN ORGANIZED HEALTH CARE EDUCATION/TRAINING PROGRAM

## 2024-09-06 PROCEDURE — 2500000001 HC RX 250 WO HCPCS SELF ADMINISTERED DRUGS (ALT 637 FOR MEDICARE OP): Performed by: HOSPITALIST

## 2024-09-06 PROCEDURE — 97116 GAIT TRAINING THERAPY: CPT | Mod: GP,CQ

## 2024-09-06 PROCEDURE — 36415 COLL VENOUS BLD VENIPUNCTURE: CPT | Performed by: STUDENT IN AN ORGANIZED HEALTH CARE EDUCATION/TRAINING PROGRAM

## 2024-09-06 PROCEDURE — 2500000001 HC RX 250 WO HCPCS SELF ADMINISTERED DRUGS (ALT 637 FOR MEDICARE OP): Performed by: INTERNAL MEDICINE

## 2024-09-06 PROCEDURE — 85027 COMPLETE CBC AUTOMATED: CPT | Performed by: STUDENT IN AN ORGANIZED HEALTH CARE EDUCATION/TRAINING PROGRAM

## 2024-09-06 RX ORDER — LISINOPRIL 10 MG/1
10 TABLET ORAL
Status: DISCONTINUED | OUTPATIENT
Start: 2024-09-07 | End: 2024-09-07

## 2024-09-06 ASSESSMENT — COGNITIVE AND FUNCTIONAL STATUS - GENERAL
MOVING TO AND FROM BED TO CHAIR: A LITTLE
TOILETING: A LOT
TURNING FROM BACK TO SIDE WHILE IN FLAT BAD: A LOT
PERSONAL GROOMING: A LITTLE
HELP NEEDED FOR BATHING: A LOT
CLIMB 3 TO 5 STEPS WITH RAILING: TOTAL
MOVING FROM LYING ON BACK TO SITTING ON SIDE OF FLAT BED WITH BEDRAILS: A LITTLE
DRESSING REGULAR UPPER BODY CLOTHING: A LOT
DRESSING REGULAR LOWER BODY CLOTHING: A LOT
DAILY ACTIVITIY SCORE: 15
WALKING IN HOSPITAL ROOM: A LITTLE
STANDING UP FROM CHAIR USING ARMS: A LITTLE
MOBILITY SCORE: 15

## 2024-09-06 ASSESSMENT — PAIN - FUNCTIONAL ASSESSMENT
PAIN_FUNCTIONAL_ASSESSMENT: 0-10

## 2024-09-06 ASSESSMENT — ACTIVITIES OF DAILY LIVING (ADL)
BATHING_LEVEL_OF_ASSISTANCE: MAXIMUM ASSISTANCE
HOME_MANAGEMENT_TIME_ENTRY: 12

## 2024-09-06 ASSESSMENT — PAIN SCALES - GENERAL
PAINLEVEL_OUTOF10: 6
PAINLEVEL_OUTOF10: 7

## 2024-09-06 ASSESSMENT — PAIN DESCRIPTION - DESCRIPTORS: DESCRIPTORS: DISCOMFORT;PRESSURE;TIGHTNESS

## 2024-09-06 ASSESSMENT — PAIN DESCRIPTION - LOCATION: LOCATION: ABDOMEN

## 2024-09-06 NOTE — NURSING NOTE
Dr. Mckinney called to evaluate patient at the bedside. Patient was in physical therapy at the bedside and suddenly felt dizzy, hot and went flacid. Patient assessed, vitals done an stroke assessment done. All negative. Patient returned to normal state.  
Dr. Mckinney came to floor to evaluate patient. Patient abdomen distended and hardened twice the size as it was during morning assessment. Patient complains of severe pain in the abdomen and has been producing emesis. New orders obtained but no bowel movement as of now.   
Patient transported to 8S.   
Rapid Response Nurse Follow Up Note    Staff concerned for patients distended abdomen and lack of BM for 7+ days. KUB showed gas & barium filled colon. Patient has received enema, lactulose, and suppository. No BM as of yet, abdomen is soft with present bowel sounds. NSR, VSS, no acute concerns from nursing staff at this time.   
no

## 2024-09-06 NOTE — PROGRESS NOTES
PROGRESS NOTE    ASSESSMENT AND PLAN:       #.  Hyponatremia  -Status post 1 dose of tolvaptan  -Serum sodium dropped overnight. Na 131 -> 126 -> 126.  -Nephro following, recs appreciated. 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 Patient is continued on fluid restricted diet. Further recs appreciated.     Abdominal pain/constipation  -Symptoms worsened on 9/7. Abd x-ray obtained. Revealed gas-filled, barium coated, mildly dilated colon.   -Bowel regimen escalated and soap suds enema ordered/administered on 9/5. 1 dose of methyl naltrexone also given  -Patient is reporting multiple small bowel movents. Is having some abdominal discomfort likely secondary to bowel regimen. Will continue to monitor     #. Troponin elevation  #. Chest pain  -ECG shows no acute ischemic changes  -Troponin was elevated at 667, trending down.  -CT angio showed negative for pulmonary embolism, shows pleural effusion  -S/p LHC on 9/3. 95% stenosis in the mid LAD with MARIANO I flow and collaterals noted, 50% stenosis in the diagonal artery, 50% stenosis in the dominant mid RCA and an LVEF of 65%.     Plan:  -Continue aspirin  -Cards following, recs appreciated. Continued medical management advised post C.   -Cont Imdur 30 mg daily     #.  Sepsis secondary to left lower extremity cellulitis  -Presented with leukocytosis and tachycardia, diffuse erythema of left lower extremity without drainage  -Seen by infectious disease.  -ancef dc'd as IV access lost. Was continued on PO keflex. To complete 10 day atb course.   -Cellulitis, overall improved    #. Gastroesophageal reflux disease  -Continues to have chest pain with eating.  -GI following, recs appreciated. Plan for outpatient EGD. Advise continued supportive care & famotidine 20 mg daily  -S/p esophogram which was negative for mass/mucosal abnormality but did note slowed clearance through distal thoracic esophagus.   -Patient is allergic to PPIs, continue famotidine and Tums as needed.    #.  Left knee pain  "2/2 hemarthrosis of left knee  -Likely induced by trauma while on Coumadin, Doppler negative for DVT  -CT scan shows a large left knee hemarthrosis not significantly changed from 8/5  -Orthopedic surgery consult, attempted an aspiration but were unable to aspirate.  -Overall improving    #.  Acute hypoxic respiratory failure  #.  Pleural effusion  -symptoms stable    #.  History of DVT in 2021  #   Paroxysmal Afib  #.  Long-term anticoagulation  -Doppler negative for DVT  -Supratherapeutic INR on presentation, now resolved.  -It appears that her DVT was provoked in 2021 when she had COVID-19.  Family will check with her doctor to see if she has any hypercoagulable disease.  If not patient can be discontinued off anticoagulation given that this is her first provoked DVT, and she has a history of multiple falls.    -Will continue on low dose eliquis given risk of falls and hx of afib.     Disposition: Care coordinators arranging DC to SNF, likely on 9/5/24    SUBJECTIVE:   Admit Date: 8/29/2024    Patient no acute events overnight.  Endorsing constipation, abdominal discomfort, and nausea.  Reports no BM for the past week.  Denies any fevers chills or chest pain.  Further ROS was unremarkable.    OBJECTIVE:   Vitals: /51 (BP Location: Right arm, Patient Position: Sitting)   Pulse 96   Temp 36.3 °C (97.3 °F) (Temporal)   Resp 20   Ht 1.575 m (5' 2.01\")   Wt 63 kg (139 lb)   SpO2 96%   BMI 25.42 kg/m²    Wt Readings from Last 3 Encounters:   08/29/24 63 kg (139 lb)   08/04/24 64.9 kg (143 lb)      24HR INTAKE/OUTPUT:  No intake or output data in the 24 hours ending 09/06/24 1222      PHYSICAL EXAM:   GENERAL: Laying in bed, does not appear to be in any distress.   HEENT: HEAD: Normocephalic atraumatic.  Neck: Supple.  Eyes: Pupils are reactive to direct light.   CVS: S1, S2 heard. Regular rate and rhythm  LUNGS: Clear to auscultate bilaterally. No wheezing or rhonchi appreciated.  ABDOMEN: Soft, nontender " "to palpate. Positive bowel sounds. No guarding or rebound appreciated.  NEUROLOGICAL: No focal neurological deficits appreciated. Cranial nerves are grossly intact.  EXTREMITIES: Left knee covered in clean bandaging.   SKIN:  Grossly intact, warm and dry.      LABS/IMAGING AND MEDICATIONS:   Scheduled Meds:apixaban, 2.5 mg, oral, q12h  aspirin, 81 mg, oral, Daily  bisacodyl, 10 mg, rectal, Daily  calcium carbonate, 500 mg, oral, Daily  cephalexin, 500 mg, oral, q6h HAILE  famotidine, 20 mg, oral, Daily  isosorbide mononitrate ER, 30 mg, oral, Daily  lactobacillus acidophilus, 1 tablet, oral, BID  lisinopril, 20 mg, oral, Daily  metoprolol tartrate, 25 mg, oral, q12h  polyethylene glycol, 17 g, oral, Daily  sennosides-docusate sodium, 2 tablet, oral, BID  urea, 15 g, oral, Daily      PRN Meds:PRN medications: acetaminophen **OR** acetaminophen **OR** acetaminophen, benzonatate, calcium carbonate, guaiFENesin, HYDROmorphone, melatonin, ondansetron **OR** ondansetron, oxyCODONE, oxyCODONE, prochlorperazine **OR** prochlorperazine **OR** prochlorperazine    No lab exists for component: \"CBC\"   No lab exists for component: \"CMP\"   No lab exists for component: \"TROPONIN\"      Results from last 7 days   Lab Units 09/03/24  0353   INR  1.3*     No lab exists for component: \"LIPIDS\"       No lab exists for component: \"URINALYSIS\"          BMP:  Results from last 7 days   Lab Units 09/06/24  0544 09/05/24  0412 09/04/24  0353   SODIUM mmol/L 126* 126* 131*  129*   POTASSIUM mmol/L 3.8 4.2 4.0  3.9   CHLORIDE mmol/L 95* 96* 97*  97*   CO2 mmol/L 21 22 20*  25   BUN mg/dL 27* 21 20  20   CREATININE mg/dL 1.36* 1.02 1.00  0.95       CBC:  Results from last 7 days   Lab Units 09/06/24  0544 09/05/24  0412 09/04/24  0353   WBC AUTO x10*3/uL 12.3* 10.4 12.6*   RBC AUTO x10*6/uL 3.20* 2.85* 3.27*   HEMOGLOBIN g/dL 9.5* 8.4* 9.6*   HEMATOCRIT % 28.0* 25.2* 29.2*   MCV fL 88 88 89   MCH pg 29.7 29.5 29.4   MCHC g/dL 33.9 33.3 32.9 " "  RDW % 12.3 12.5 12.6   PLATELETS AUTO x10*3/uL 477* 383 407       Cardiac Enzymes:   Results from last 7 days   Lab Units 09/02/24  0411 09/01/24  0410 08/31/24  0922   TROPHS ng/L 211* 602* 674*   CK TOTAL U/L  --   --  54         Hepatic Function Panel:        No lab exists for component: \"LABALBU\"      Magnesium:  Results from last 7 days   Lab Units 09/03/24  0353   MAGNESIUM mg/dL 1.75       Pro-BNP:  No results found for: \"PROBNP\"    INR:  Results from last 7 days   Lab Units 09/03/24  0353 09/01/24  1311 09/01/24  0753   PROTIME seconds 15.1* 18.2* 19.6*   INR  1.3* 1.6* 1.7*       TSH:  No results found for: \"TSH\"      Lipid Profile:  Results from last 7 days   Lab Units 09/03/24  0353   TRIGLYCERIDES mg/dL 82   HDL mg/dL 43.6   LDL CALC mg/dL 71       HgbA1C:        Lactate Level:  No lab exists for component: \"LACTA\"    CMP:  Results from last 7 days   Lab Units 09/06/24  0544 09/05/24  0412 09/04/24  0353   SODIUM mmol/L 126* 126* 131*  129*   POTASSIUM mmol/L 3.8 4.2 4.0  3.9   CHLORIDE mmol/L 95* 96* 97*  97*   CO2 mmol/L 21 22 20*  25   BUN mg/dL 27* 21 20  20   CREATININE mg/dL 1.36* 1.02 1.00  0.95   GLUCOSE mg/dL 128* 111* 85  102*   CALCIUM mg/dL 9.2 8.4* 9.1  9.0       Amylase:        Lipase:        ABG:        No lab exists for component: \"PO2\", \"PCO2\", \"HCO3\", \"BE\", \"O2SAT\"       "

## 2024-09-06 NOTE — PROGRESS NOTES
[facility] - can dc when ready  9/6- patient will need 1-2 more DOS, per attending MD. Still with Gi concerns.

## 2024-09-06 NOTE — PROGRESS NOTES
Physical Therapy    Physical Therapy Treatment    Patient Name: Sommer Farmer  MRN: [id number]  Today's Date: 9/6/2024  Time Calculation  Start Time: 1112  Stop Time: 1137  Time Calculation (min): 25 min     806/806-A    Assessment/Plan   PT Assessment  PT Assessment Results: Decreased mobility, Decreased strength  Rehab Prognosis: Good  Barriers to Discharge: Unable to ambulate at this time  Evaluation/Treatment Tolerance: Patient tolerated treatment well  Medical Staff Made Aware: Yes  End of Session Communication: Bedside nurse  Assessment Comment: pt would benefit from continued therapy to improved functional mobility and safety  End of Session Patient Position: Alarm on, Up in chair  PT Plan  Inpatient/Swing Bed or Outpatient: Inpatient  Treatment/Interventions: Transfer training, Gait training, Strengthening, Therapeutic exercise  PT Plan: Ongoing PT  PT Frequency: 3 times per week  PT Discharge Recommendations: Moderate intensity level of continued care  Equipment Recommended upon Discharge: Wheeled walker PT Recommended Transfer Status: Total assist    General Visit Information:   PT  Visit  PT Received On: 09/06/24  General  Reason for Referral: impaired mobility  Family/Caregiver Present: Yes  Caregiver Feedback: present and supportive  Prior to Session Communication: Bedside nurse (cleared to participate)  Patient Position Received: Alarm on, Up in chair  General Comment: agreeable to particpate, family encouraging pt to increase activity in hopes it will help with her constipation    General Observations:   General Observation: tele    Subjective     Precautions:  Precautions  UE Weight Bearing Status:  (L UE limb alert)  LE Weight Bearing Status: Weight Bearing as Tolerated  Medical Precautions: Fall precautions  Braces Applied: KI PRN (KI in room , pt not using)  Precautions Comment: KI donned by therapist at start of session and worn for entire session.    Vital Signs:     Objective     Pain:  Pain  Assessment  Pain Assessment: 0-10  0-10 (Numeric) Pain Score:  (pt c/o abdominal pressure and discomfort , no numerical value given)  Pain Interventions: Ambulation/increased activity    Cognition:  Cognition  Overall Cognitive Status: Within Functional Limits      Activity Tolerance:  Activity Tolerance  Endurance: Decreased tolerance for upright activites    Treatments:  Therapeutic Exercise  Therapeutic Exercise Performed: Yes  Therapeutic Exercise Activity 1: instructed in and perfomed ther ex including AP, GS, LAQ, Hip flexion, abd/add, and standing heel toe raises  10 reps each (pt states she does exercises daily , 1-2 sets when at home , encouraged to perform during inpatient stay as well)  Bed Mobility  Bed Mobility: No  Ambulation/Gait Training  Ambulation/Gait Training Performed: Yes  Ambulation/Gait Training 1  Surface 1: Level tile  Device 1: Rolling walker  Comments/Distance (ft) 1: ambulating  15ft x 2  with CGA and  WW , vc for posture and to avoid looking down. seated rest break in between trials  Transfers  Transfer: Yes  Transfer 1  Technique 1: Sit to stand, Stand to sit  Trials/Comments 1: transfers sit <--> stand with  CGA and vc for technique,  hand placement and safety. pt is receptive to inctruction and motivated to increase actvity  Stairs  Stairs: No       Outcome Measures:     [facility] Basic Mobility  Turning from your back to your side while in a flat bed without using bedrails: A little  Moving from lying on your back to sitting on the side of a flat bed without using bedrails: A lot  Moving to and from bed to chair (including a wheelchair): A little  Standing up from a chair using your arms (e.g. wheelchair or bedside chair): A little  To walk in hospital room: A little  Climbing 3-5 steps with railing: Total  Basic Mobility - Total Score: 15          EDUCATION:    Individual(s) Educated: Patient (family)  Education Provided: Fall Risk, Home Exercise Program, Home Safety  Patient Response  to Education: Patient/Caregiver Verbalized Understanding of Information  Education Comment: Education provided on benefits of therapy and improving standing tolerance, positioning, and strengthening.    Encounter Problems       Encounter Problems (Active)       Impaired mobility        Perform all bed mobility with minAx1 (Progressing)       Start:  08/30/24    Expected End:  09/13/24            Perform all transfers with ww and minAx1 (Progressing)       Start:  08/30/24    Expected End:  09/13/24            Patient will ambulate 25 ft with ww and minAx1 (Progressing)       Start:  08/30/24    Expected End:  09/13/24            Patient will perform BLE HEP with supervision to improve strength and L knee ROM  (Progressing)       Start:  08/30/24    Expected End:  09/13/24            Patient will tolerate sitting EOB >/= 5 min with supervision  (Progressing)       Start:  08/30/24    Expected End:  09/13/24               Pain - Adult

## 2024-09-06 NOTE — PROGRESS NOTES
Occupational Therapy    OT Treatment    Patient Name: Sommer Farmer  MRN: [id number]  Today's Date: 9/6/2024  Time Calculation  Start Time: 0925  Stop Time: 0949  Time Calculation (min): 24 min      Assessment:  End of Session Communication: Bedside nurse  End of Session Patient Position: Alarm on, Up in chair     Plan:  Treatment Interventions: ADL retraining, Functional transfer training, Endurance training, Patient/family training, Equipment evaluation/education, Compensatory technique education  OT Frequency: 3 times per week  Treatment Interventions: ADL retraining, Functional transfer training, Endurance training, Patient/family training, Equipment evaluation/education, Compensatory technique education    Subjective   Previous Visit Info:  OT Last Visit  OT Received On: 09/06/24  General:  General  Family/Caregiver Present: Yes  Prior to Session Communication: Bedside nurse  Patient Position Received: Alarm on, Up in chair  Precautions:  LE Weight Bearing Status: Weight Bearing as Tolerated  Medical Precautions: Fall precautions    Pain:  Pain Assessment  Pain Assessment: 0-10  Pain Location:  (ABD pain/pressure and gas)    Objective    Cognition:  Cognition  Overall Cognitive Status: Impaired  Orientation Level: Oriented X4  Following Commands: Follows multistep commands with repetition  Problem Solving: Assistance required to implement solutions  Insight: Moderate  Impulsive: Mildly     Activities of Daily Living: LE Bathing  LE Bathing Level of Assistance: Maximum assistance  LE Bathing Where Assessed:  (toilet)    LE Dressing  LE Dressing: Yes  Sock Level of Assistance: Maximum assistance (doffed/donned)  Adult Briefs Level of Assistance:  (donned underwear with max a. pants mgmt while in stance with mod a.)  LE Dressing Where Assessed: Toilet    Toileting  Toileting Level of Assistance: Moderate assistance (for thoroughness)  Where Assessed: Toilet  Functional Standing Tolerance:  Functional Standing  Tolerance Comments: patient stood for a total of 3 mins with fair balance with 2-1 ue support as needed during functional ambulation/transfers and ADL tasks  Bed Mobility/Transfers: Transfers  Transfer: Yes  Transfer 1  Technique 1: Sit to stand, Stand to sit  Transfer Device 1: Walker  Transfer Level of Assistance 1: Contact guard  Transfers 2  Transfer Device 2: Walker  Transfer Level of Assistance 2: Contact guard  Trials/Comments 2: functional ambualtion    Toilet Transfers  Toilet Transfer Type: To and from  Toilet Transfer to: Standard toilet  Toilet Transfer Technique: Ambulating  Toilet Transfers: Minimal assistance  Sitting Balance:  Dynamic Sitting Balance  Dynamic Sitting-Level of Assistance:  (fair+)  Standing Balance:  Dynamic Standing Balance  Dynamic Standing-Level of Assistance:  (fair)    Outcome Measures:[facility] Daily Activity  Putting on and taking off regular lower body clothing: A lot  Bathing (including washing, rinsing, drying): A lot  Putting on and taking off regular upper body clothing: A lot  Toileting, which includes using toilet, bedpan or urinal: A lot  Taking care of personal grooming such as brushing teeth: A little  Eating Meals: None  Daily Activity - Total Score: 15    Education Documentation  Body Mechanics, taught by CHRISTINA Foster at 9/6/2024  1:22 PM.  Learner: Family, Patient  Readiness: Acceptance  Method: Explanation  Response: Needs Reinforcement    Precautions, taught by CHRISTINA Foster at 9/6/2024  1:22 PM.  Learner: Family, Patient  Readiness: Acceptance  Method: Explanation  Response: Needs Reinforcement    ADL Training, taught by CHRISTINA Foster at 9/6/2024  1:22 PM.  Learner: Family, Patient  Readiness: Acceptance  Method: Explanation  Response: Needs Reinforcement    OP EDUCATION:  Education  Individual(s) Educated: Patient (family)  Education Provided: Symptom management, Ergonomics and postural realignment, Joint protection and energy conservation, Fall  precautons, Risk and benefits of OT discussed with patient or other, POC discussed and agreed upon  Equipment:  (AE, EC tech, home DME)  Patient/Caregiver Demonstrated Understanding: yes  Plan of Care Discussed and Agreed Upon: yes  Patient Response to Education: Patient/Caregiver Verbalized Understanding of Information    Goals:  Encounter Problems       Encounter Problems (Active)       OT Goals       Mod assist bed mobility.  (Progressing)       Start:  08/30/24    Expected End:  09/15/24            Mod assist sit/stand, bed/chair/commode with FWW. (Progressing)       Start:  08/30/24    Expected End:  09/15/24            Good dynamic sitting for ADL.  (Progressing)       Start:  08/30/24    Expected End:  09/15/24            Poor (+) dynamic standing for ADL with UE support.  (Progressing)       Start:  08/30/24    Expected End:  09/15/24            Tolerate 10 mins light functional activity.  (Progressing)       Start:  08/30/24    Expected End:  09/15/24

## 2024-09-06 NOTE — PROGRESS NOTES
Plan is for [facility] sn[facility], updated referral sent.  Pt. With medicare ins., will not require ins. Precert.

## 2024-09-06 NOTE — PROGRESS NOTES
Renal Progress Note  Assessment and Plan:    89 y.o. yo female admitted with cellulitis.  Has normal EF but has some edema.  Tried on lasix in July but na went to 123 and high k.  Says she isn't taking lasix anymore.  Used to be on hydrochlorothiazide but this was dcd 11/2023.  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  +1 peripheral edema  Observed worsening GFR  No drop in the blood pressure     Plan/  Continue urea powder  Decrease lisinopril to 10 mg daily  As patient is 89 years old and lab work suggest SIADH type II and giving the effect of caution and lack of any other evidence of increased extracellular fluid volume except the lower extremity edema was noted discussed with  50 cc of normal saline x 10 hours and reassess clinical and laboratory specially GFR        Subjective:   Admit Date: 8/29/2024    Interval History: Patient seen and examined uneventful night way much feeling better compared to yesterday did have 2 small bowel movement any other symptoms alert oriented follow command in no apparent pain at this      Medications:   Scheduled Meds:apixaban, 2.5 mg, oral, q12h  aspirin, 81 mg, oral, Daily  bisacodyl, 10 mg, rectal, Daily  calcium carbonate, 500 mg, oral, Daily  cephalexin, 500 mg, oral, q6h HAILE  famotidine, 20 mg, oral, Daily  isosorbide mononitrate ER, 30 mg, oral, Daily  lactobacillus acidophilus, 1 tablet, oral, BID  lisinopril, 20 mg, oral, Daily  metoprolol tartrate, 25 mg, oral, q12h  polyethylene glycol, 17 g, oral, Daily  sennosides-docusate sodium, 2 tablet, oral, BID  urea, 15 g, oral, Daily      Continuous Infusions:     CBC:   Lab Results   Component Value Date    HGB 9.5 (L) 09/06/2024    HGB 8.4 (L) 09/05/2024    WBC 12.3 (H) 09/06/2024    WBC 10.4 09/05/2024     (H) 09/06/2024     09/05/2024      Anemia:  No results found for:  "\"FERRITIN\", \"IRON\", \"TIBC\"   BMP:    Lab Results   Component Value Date     (L) 09/06/2024     (L) 09/05/2024    K 3.8 09/06/2024    K 4.2 09/05/2024    CL 95 (L) 09/06/2024    CL 96 (L) 09/05/2024    CO2 21 09/06/2024    CO2 22 09/05/2024    BUN 27 (H) 09/06/2024    BUN 21 09/05/2024    CREATININE 1.36 (H) 09/06/2024    CREATININE 1.02 09/05/2024      Bone disease:   Lab Results   Component Value Date    PHOS 2.9 09/04/2024      Urinalysis:  No results found for: \"MONET\", \"PROTUR\", \"GLUCOSEU\", \"BLOODU\", \"KETONESU\", \"BILIRUBINU\", \"NITRITEU\", \"LEUKOCYTESU\", \"UTPCR\"     Objective:   Vitals: /51 (BP Location: Right arm, Patient Position: Sitting)   Pulse 96   Temp 36.3 °C (97.3 °F) (Temporal)   Resp 20   Ht 1.575 m (5' 2.01\")   Wt 63 kg (139 lb)   SpO2 96%   BMI 25.42 kg/m²    Wt Readings from Last 3 Encounters:   08/29/24 63 kg (139 lb)   08/04/24 64.9 kg (143 lb)      24HR INTAKE/OUTPUT:  No intake or output data in the 24 hours ending 09/06/24 1302  Admission weight:  Weight: 64.5 kg (142 lb 3.2 oz)      Constitutional:  Alert, awake, no apparent distress   Skin:normal, no rash  HEENT:sclera anicteric.  Head atraumatic normocephalic  Neck:supple with no thyromegally  Cardiovascular:  S1, S2 without m/r/g   Respiratory:  CTA B without w/r/r   Abdomen: +bs, soft, nt  Ext: no LE edema  Musculoskeletal:Intact  Neuro:Alert and oriented with no deficit      Electronically signed by Wang Young MD on 9/6/2024 at 1:02 PM            "

## 2024-09-07 LAB
ANION GAP SERPL CALC-SCNC: 13 MMOL/L (ref 10–20)
BUN SERPL-MCNC: 35 MG/DL (ref 6–23)
CALCIUM SERPL-MCNC: 8.6 MG/DL (ref 8.6–10.3)
CHLORIDE SERPL-SCNC: 94 MMOL/L (ref 98–107)
CO2 SERPL-SCNC: 21 MMOL/L (ref 21–32)
CREAT SERPL-MCNC: 1.25 MG/DL (ref 0.5–1.05)
EGFRCR SERPLBLD CKD-EPI 2021: 41 ML/MIN/1.73M*2
ERYTHROCYTE [DISTWIDTH] IN BLOOD BY AUTOMATED COUNT: 12.3 % (ref 11.5–14.5)
GLUCOSE SERPL-MCNC: 92 MG/DL (ref 74–99)
HCT VFR BLD AUTO: 24.8 % (ref 36–46)
HGB BLD-MCNC: 8.3 G/DL (ref 12–16)
MCH RBC QN AUTO: 29.2 PG (ref 26–34)
MCHC RBC AUTO-ENTMCNC: 33.5 G/DL (ref 32–36)
MCV RBC AUTO: 87 FL (ref 80–100)
NRBC BLD-RTO: 0 /100 WBCS (ref 0–0)
PLATELET # BLD AUTO: 424 X10*3/UL (ref 150–450)
POTASSIUM SERPL-SCNC: 4 MMOL/L (ref 3.5–5.3)
RBC # BLD AUTO: 2.84 X10*6/UL (ref 4–5.2)
SODIUM SERPL-SCNC: 124 MMOL/L (ref 136–145)
WBC # BLD AUTO: 9.2 X10*3/UL (ref 4.4–11.3)

## 2024-09-07 PROCEDURE — 2500000004 HC RX 250 GENERAL PHARMACY W/ HCPCS (ALT 636 FOR OP/ED): Performed by: STUDENT IN AN ORGANIZED HEALTH CARE EDUCATION/TRAINING PROGRAM

## 2024-09-07 PROCEDURE — 36415 COLL VENOUS BLD VENIPUNCTURE: CPT | Performed by: STUDENT IN AN ORGANIZED HEALTH CARE EDUCATION/TRAINING PROGRAM

## 2024-09-07 PROCEDURE — 2500000001 HC RX 250 WO HCPCS SELF ADMINISTERED DRUGS (ALT 637 FOR MEDICARE OP): Performed by: HOSPITALIST

## 2024-09-07 PROCEDURE — 2500000001 HC RX 250 WO HCPCS SELF ADMINISTERED DRUGS (ALT 637 FOR MEDICARE OP): Performed by: NURSE PRACTITIONER

## 2024-09-07 PROCEDURE — 1100000001 HC PRIVATE ROOM DAILY

## 2024-09-07 PROCEDURE — 2500000005 HC RX 250 GENERAL PHARMACY W/O HCPCS: Performed by: STUDENT IN AN ORGANIZED HEALTH CARE EDUCATION/TRAINING PROGRAM

## 2024-09-07 PROCEDURE — 2500000001 HC RX 250 WO HCPCS SELF ADMINISTERED DRUGS (ALT 637 FOR MEDICARE OP): Performed by: STUDENT IN AN ORGANIZED HEALTH CARE EDUCATION/TRAINING PROGRAM

## 2024-09-07 PROCEDURE — 2500000001 HC RX 250 WO HCPCS SELF ADMINISTERED DRUGS (ALT 637 FOR MEDICARE OP): Performed by: INTERNAL MEDICINE

## 2024-09-07 PROCEDURE — 99232 SBSQ HOSP IP/OBS MODERATE 35: CPT | Performed by: STUDENT IN AN ORGANIZED HEALTH CARE EDUCATION/TRAINING PROGRAM

## 2024-09-07 PROCEDURE — 2500000001 HC RX 250 WO HCPCS SELF ADMINISTERED DRUGS (ALT 637 FOR MEDICARE OP)

## 2024-09-07 PROCEDURE — 82374 ASSAY BLOOD CARBON DIOXIDE: CPT | Performed by: STUDENT IN AN ORGANIZED HEALTH CARE EDUCATION/TRAINING PROGRAM

## 2024-09-07 PROCEDURE — 85027 COMPLETE CBC AUTOMATED: CPT | Performed by: STUDENT IN AN ORGANIZED HEALTH CARE EDUCATION/TRAINING PROGRAM

## 2024-09-07 RX ORDER — LIDOCAINE 560 MG/1
1 PATCH PERCUTANEOUS; TOPICAL; TRANSDERMAL DAILY
Status: DISCONTINUED | OUTPATIENT
Start: 2024-09-07 | End: 2024-09-09 | Stop reason: HOSPADM

## 2024-09-07 RX ORDER — LOSARTAN POTASSIUM 25 MG/1
25 TABLET ORAL DAILY
Status: DISCONTINUED | OUTPATIENT
Start: 2024-09-08 | End: 2024-09-09 | Stop reason: HOSPADM

## 2024-09-07 RX ORDER — SODIUM CHLORIDE 1000 MG
1000 TABLET, SOLUBLE MISCELLANEOUS
Status: DISCONTINUED | OUTPATIENT
Start: 2024-09-07 | End: 2024-09-09 | Stop reason: HOSPADM

## 2024-09-07 ASSESSMENT — COGNITIVE AND FUNCTIONAL STATUS - GENERAL
DRESSING REGULAR UPPER BODY CLOTHING: A LITTLE
MOVING TO AND FROM BED TO CHAIR: A LOT
STANDING UP FROM CHAIR USING ARMS: A LOT
WALKING IN HOSPITAL ROOM: A LOT
MOBILITY SCORE: 13
TOILETING: A LITTLE
DAILY ACTIVITIY SCORE: 19
TURNING FROM BACK TO SIDE WHILE IN FLAT BAD: A LITTLE
CLIMB 3 TO 5 STEPS WITH RAILING: TOTAL
PERSONAL GROOMING: A LITTLE
DRESSING REGULAR LOWER BODY CLOTHING: A LITTLE
MOVING FROM LYING ON BACK TO SITTING ON SIDE OF FLAT BED WITH BEDRAILS: A LITTLE
HELP NEEDED FOR BATHING: A LITTLE

## 2024-09-07 ASSESSMENT — PAIN SCALES - GENERAL
PAINLEVEL_OUTOF10: 10 - WORST POSSIBLE PAIN
PAINLEVEL_OUTOF10: 0 - NO PAIN
PAINLEVEL_OUTOF10: 0 - NO PAIN

## 2024-09-07 ASSESSMENT — PAIN - FUNCTIONAL ASSESSMENT
PAIN_FUNCTIONAL_ASSESSMENT: 0-10
PAIN_FUNCTIONAL_ASSESSMENT: 0-10

## 2024-09-07 NOTE — PROGRESS NOTES
"  Infectious Disease      History of Present Illness:   Patient 89-year-old female presenting with left knee pain.  She had a hematoma her left knee for 3 weeks previously was improving and then worsening.  She had some pain in her left calf became more swollen more red.  She is feeling better now.  Almost resolution of the erythema present.  The left knee pain had improved a CT scan had showed large left knee hemarthrosis which was similar to present.  Aspiration was attempted but could not take place, as per orthopedics.    Feels ok . No fevers    Physical Exam:    Blood pressure 145/62, pulse 98, temperature 36.6 °C (97.9 °F), temperature source Temporal, resp. rate 17, height 1.575 m (5' 2.01\"), weight 63 kg (139 lb), SpO2 95%.  General: Patient appears ok at the present time. NAD  Skin: no new rashes  HEENT:  Neck is supple, No subconjunctival hemorrhages, no oral exudates  Heart: S1 S2  Lungs: clear bilaterally  Abdomen: soft, ND, NTTP,  Back :no CVA tenderness  Extrem: No edema, non tender no erythema left lower  Neuro exam: CN II-XII intact  Psych: cooperative    Labs:  I have reviewed all lab results by electronic record, including most recent CBC, metabolic panel, and pertinent abnormalities were addressed from an infectious disease perspective.  Trends are being monitored over time.    Lab Results   Component Value Date    WBC 12.3 (H) 09/06/2024    HGB 9.5 (L) 09/06/2024    HCT 28.0 (L) 09/06/2024    MCV 88 09/06/2024     (H) 09/06/2024     Lab Results   Component Value Date    GLUCOSE 128 (H) 09/06/2024    CALCIUM 9.2 09/06/2024     (L) 09/06/2024    K 3.8 09/06/2024    CO2 21 09/06/2024    CL 95 (L) 09/06/2024    BUN 27 (H) 09/06/2024    CREATININE 1.36 (H) 09/06/2024       Radiology:  I have reviewed imaging results per electronic record and most pertinent abnormalities are being addressed from an infectious disease standpoint.            ASSESSMENT:  Problem List Items Addressed This " Visit          Cardiac and Vasculature    NSTEMI (non-ST elevated myocardial infarction) (Multi)    Relevant Medications    metoprolol tartrate (Lopressor) tablet 25 mg    isosorbide mononitrate ER (Imdur) 60 mg 24 hr tablet    isosorbide mononitrate ER (Imdur) 24 hr tablet 30 mg    Other Relevant Orders    Case Request Cath Lab: Left Heart Cath, Angioplasty - Coronary (Completed)    Cardiac Catheterization Procedure (Completed)       Coag and Thromboembolic    Current use of long term anticoagulation    Relevant Orders    Case Request Cath Lab: Left Heart Cath, Angioplasty - Coronary (Completed)    Cardiac Catheterization Procedure (Completed)    History of DVT (deep vein thrombosis)    Relevant Orders    Case Request Cath Lab: Left Heart Cath, Angioplasty - Coronary (Completed)    Cardiac Catheterization Procedure (Completed)       Endocrine/Metabolic    Hyponatremia    Relevant Orders    Case Request Cath Lab: Left Heart Cath, Angioplasty - Coronary (Completed)    Cardiac Catheterization Procedure (Completed)       Infectious Diseases    * (Principal) Sepsis due to cellulitis (Multi)    Relevant Orders    Case Request Cath Lab: Left Heart Cath, Angioplasty - Coronary (Completed)    Cardiac Catheterization Procedure (Completed)       Musculoskeletal and Injuries    Hemarthrosis - Primary     Other Visit Diagnoses       Cellulitis of left lower extremity        Sepsis without acute organ dysfunction, due to unspecified organism (Multi)                   PLAN:  Patient already on oral therapy transition which is okay will complete total of 10 days for cellulitis.  Patient should follow-up with orthopedics regarding left knee if things worsen off antibiotics this would suggest an infection prior here, but seems less likely with information we have

## 2024-09-07 NOTE — PROGRESS NOTES
PROGRESS NOTE    ASSESSMENT AND PLAN:       #.  Hyponatremia  -Status post 1 dose of tolvaptan  -Serum sodium dropped continues to downtrend. 131 -> 124  -Nephro following, recs appreciated. 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 Patient is continued on fluid restricted diet. Urea was Dc'd and patient started on salt tablets BID. Lisinopril also transitioned to losartan.     Abdominal pain/constipation  -Symptoms worsened on 9/7. Abd x-ray obtained. Revealed gas-filled, barium coated, mildly dilated colon.   -Bowel regimen escalated and soap suds enema ordered/administered on 9/5. 1 dose of methyl naltrexone also given  -Having small bowel movements. Does not feel empty. Will give soap suds enema today     #. Troponin elevation  #. Chest pain  -ECG shows no acute ischemic changes  -Troponin was elevated at 667, trending down.  -CT angio showed negative for pulmonary embolism, shows pleural effusion  -S/p LHC on 9/3. 95% stenosis in the mid LAD with MARIANO I flow and collaterals noted, 50% stenosis in the diagonal artery, 50% stenosis in the dominant mid RCA and an LVEF of 65%.     Plan:  -Continue aspirin  -Cards following, recs appreciated. Continued medical management advised post LHC.   -Cont Imdur 30 mg daily     #.  Sepsis secondary to left lower extremity cellulitis  -Presented with leukocytosis and tachycardia, diffuse erythema of left lower extremity without drainage  -Seen by infectious disease.  -ancef dc'd as IV access lost. Was continued on PO keflex. To complete 10 day atb course.   -Cellulitis, overall improved    #. Gastroesophageal reflux disease  -Continues to have chest pain with eating.  -GI following, recs appreciated. Plan for outpatient EGD. Advise continued supportive care & famotidine 20 mg daily  -S/p esophogram which was negative for mass/mucosal abnormality but did note slowed clearance through distal thoracic esophagus.   -Patient is allergic to PPIs, continue famotidine and Tums as needed.    #.  Left  "knee pain 2/2 hemarthrosis of left knee  -Likely induced by trauma while on Coumadin, Doppler negative for DVT  -CT scan shows a large left knee hemarthrosis not significantly changed from 8/5  -Orthopedic surgery consult, attempted an aspiration but were unable to aspirate.  -Overall improving    #.  Acute hypoxic respiratory failure  #.  Pleural effusion  -symptoms stable    #.  History of DVT in 2021  #   Paroxysmal Afib  #.  Long-term anticoagulation  -Doppler negative for DVT  -Supratherapeutic INR on presentation, now resolved.  -It appears that her DVT was provoked in 2021 when she had COVID-19.  Family will check with her doctor to see if she has any hypercoagulable disease.  If not patient can be discontinued off anticoagulation given that this is her first provoked DVT, and she has a history of multiple falls.    -Will continue on low dose eliquis given risk of falls and hx of afib.     Disposition: Care coordinators arranging DC to SNF, likely on 9/5/24    SUBJECTIVE:   Admit Date: 8/29/2024    Patient had no acute events overnight. Abdominal discomfort improving. Further ROS was unremarkable.     OBJECTIVE:   Vitals: /59 (BP Location: Right arm, Patient Position: Lying)   Pulse 82   Temp 36.3 °C (97.3 °F) (Temporal)   Resp 18   Ht 1.575 m (5' 2.01\")   Wt 63 kg (139 lb)   SpO2 97%   BMI 25.42 kg/m²    Wt Readings from Last 3 Encounters:   08/29/24 63 kg (139 lb)   08/04/24 64.9 kg (143 lb)      24HR INTAKE/OUTPUT:    Intake/Output Summary (Last 24 hours) at 9/7/2024 1521  Last data filed at 9/7/2024 0900  Gross per 24 hour   Intake 120 ml   Output --   Net 120 ml         PHYSICAL EXAM:   GENERAL: Laying in bed, does not appear to be in any distress.   HEENT: HEAD: Normocephalic atraumatic.  Neck: Supple.  Eyes: Pupils are reactive to direct light.   CVS: S1, S2 heard. Regular rate and rhythm  LUNGS: Clear to auscultate bilaterally. No wheezing or rhonchi appreciated.  ABDOMEN: Soft, nontender " "to palpate. Positive bowel sounds. No guarding or rebound appreciated.  NEUROLOGICAL: No focal neurological deficits appreciated. Cranial nerves are grossly intact.  SKIN:  Grossly intact, warm and dry.      LABS/IMAGING AND MEDICATIONS:   Scheduled Meds:apixaban, 2.5 mg, oral, q12h  aspirin, 81 mg, oral, Daily  bisacodyl, 10 mg, rectal, Daily  calcium carbonate, 500 mg, oral, Daily  cephalexin, 500 mg, oral, q6h HAILE  famotidine, 20 mg, oral, Daily  isosorbide mononitrate ER, 30 mg, oral, Daily  lactobacillus acidophilus, 1 tablet, oral, BID  lidocaine, 1 patch, transdermal, Daily  [START ON 9/8/2024] losartan, 25 mg, oral, Daily  metoprolol tartrate, 25 mg, oral, q12h  polyethylene glycol, 17 g, oral, Daily  sennosides-docusate sodium, 2 tablet, oral, BID  sodium chloride, 1,000 mg, oral, BID      PRN Meds:PRN medications: acetaminophen **OR** acetaminophen **OR** acetaminophen, benzonatate, calcium carbonate, guaiFENesin, HYDROmorphone, melatonin, ondansetron **OR** ondansetron, oxyCODONE, oxyCODONE, prochlorperazine **OR** prochlorperazine **OR** prochlorperazine    No lab exists for component: \"CBC\"   No lab exists for component: \"CMP\"   No lab exists for component: \"TROPONIN\"      Results from last 7 days   Lab Units 09/03/24  0353   INR  1.3*     No lab exists for component: \"LIPIDS\"       No lab exists for component: \"URINALYSIS\"          BMP:  Results from last 7 days   Lab Units 09/07/24 0441 09/06/24  0544 09/05/24  0412   SODIUM mmol/L 124* 126* 126*   POTASSIUM mmol/L 4.0 3.8 4.2   CHLORIDE mmol/L 94* 95* 96*   CO2 mmol/L 21 21 22   BUN mg/dL 35* 27* 21   CREATININE mg/dL 1.25* 1.36* 1.02       CBC:  Results from last 7 days   Lab Units 09/07/24 0441 09/06/24  0544 09/05/24  0412   WBC AUTO x10*3/uL 9.2 12.3* 10.4   RBC AUTO x10*6/uL 2.84* 3.20* 2.85*   HEMOGLOBIN g/dL 8.3* 9.5* 8.4*   HEMATOCRIT % 24.8* 28.0* 25.2*   MCV fL 87 88 88   MCH pg 29.2 29.7 29.5   MCHC g/dL 33.5 33.9 33.3   RDW % 12.3 12.3 " "12.5   PLATELETS AUTO x10*3/uL 424 477* 383       Cardiac Enzymes:   Results from last 7 days   Lab Units 09/02/24  0411 09/01/24  0410   TROPHS ng/L 211* 602*         Hepatic Function Panel:        No lab exists for component: \"LABALBU\"      Magnesium:  Results from last 7 days   Lab Units 09/03/24  0353   MAGNESIUM mg/dL 1.75       Pro-BNP:  No results found for: \"PROBNP\"    INR:  Results from last 7 days   Lab Units 09/03/24  0353 09/01/24  1311 09/01/24  0753   PROTIME seconds 15.1* 18.2* 19.6*   INR  1.3* 1.6* 1.7*       TSH:  No results found for: \"TSH\"      Lipid Profile:  Results from last 7 days   Lab Units 09/03/24  0353   TRIGLYCERIDES mg/dL 82   HDL mg/dL 43.6   LDL CALC mg/dL 71       HgbA1C:        Lactate Level:  No lab exists for component: \"LACTA\"    CMP:  Results from last 7 days   Lab Units 09/07/24  0441 09/06/24  0544 09/05/24  0412   SODIUM mmol/L 124* 126* 126*   POTASSIUM mmol/L 4.0 3.8 4.2   CHLORIDE mmol/L 94* 95* 96*   CO2 mmol/L 21 21 22   BUN mg/dL 35* 27* 21   CREATININE mg/dL 1.25* 1.36* 1.02   GLUCOSE mg/dL 92 128* 111*   CALCIUM mg/dL 8.6 9.2 8.4*       Amylase:        Lipase:        ABG:        No lab exists for component: \"PO2\", \"PCO2\", \"HCO3\", \"BE\", \"O2SAT\"       "

## 2024-09-07 NOTE — PROGRESS NOTES
"Renal Progress Note  Assessment and Plan:    89 y.o. yo female admitted with cellulitis.  Has normal EF but has some edema.  Tried on lasix in July but na went to 123 and high k.  Says she isn't taking lasix anymore.  Used to be on hydrochlorothiazide but this was dcd 11/2023.  Has edema and pleural effusions and na in low to mid 120s, baseline Na in the low 130s, cortisol 19 making adrenal insufficiency unlikely. Had some improvement w/ samsca (on 9/1, no response to 2nd dose on 9/2). 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  +1 peripheral edema  Observed worsening GFR  No drop in the blood pressure     Plan:  stopping urea powder as pt hates the taste  Will try salt tabs 1 g BID   Changing her lisinopril to losartan as this is rare etiology of hyponatremia          Subjective:   Admit Date: 8/29/2024    Interval History: Na down to 124, got  fluids yesteday but labs worse today, started urea packets yesterday       Medications:   Scheduled Meds:apixaban, 2.5 mg, oral, q12h  aspirin, 81 mg, oral, Daily  bisacodyl, 10 mg, rectal, Daily  calcium carbonate, 500 mg, oral, Daily  cephalexin, 500 mg, oral, q6h HAILE  famotidine, 20 mg, oral, Daily  isosorbide mononitrate ER, 30 mg, oral, Daily  lactobacillus acidophilus, 1 tablet, oral, BID  lidocaine, 1 patch, transdermal, Daily  lisinopril, 10 mg, oral, Daily  metoprolol tartrate, 25 mg, oral, q12h  polyethylene glycol, 17 g, oral, Daily  sennosides-docusate sodium, 2 tablet, oral, BID  urea, 15 g, oral, Daily      Continuous Infusions:     CBC:   Lab Results   Component Value Date    HGB 8.3 (L) 09/07/2024    HGB 9.5 (L) 09/06/2024    WBC 9.2 09/07/2024    WBC 12.3 (H) 09/06/2024     09/07/2024     (H) 09/06/2024      Anemia:  No results found for: \"FERRITIN\", \"IRON\", \"TIBC\"   BMP:    Lab Results   Component Value Date     (L) 09/07/2024     (L) 09/06/2024    K 4.0 " "09/07/2024    K 3.8 09/06/2024    CL 94 (L) 09/07/2024    CL 95 (L) 09/06/2024    CO2 21 09/07/2024    CO2 21 09/06/2024    BUN 35 (H) 09/07/2024    BUN 27 (H) 09/06/2024    CREATININE 1.25 (H) 09/07/2024    CREATININE 1.36 (H) 09/06/2024      Bone disease:   No results found for: \"PHOS\", \"PTH\", \"VITD25\"     Urinalysis:  No results found for: \"MONET\", \"PROTUR\", \"GLUCOSEU\", \"BLOODU\", \"KETONESU\", \"BILIRUBINU\", \"NITRITEU\", \"LEUKOCYTESU\", \"UTPCR\"     Objective:   Vitals: /59 (BP Location: Right arm, Patient Position: Lying)   Pulse 82   Temp 36.3 °C (97.3 °F) (Temporal)   Resp 18   Ht 1.575 m (5' 2.01\")   Wt 63 kg (139 lb)   SpO2 97%   BMI 25.42 kg/m²    Wt Readings from Last 3 Encounters:   08/29/24 63 kg (139 lb)   08/04/24 64.9 kg (143 lb)      24HR INTAKE/OUTPUT:    Intake/Output Summary (Last 24 hours) at 9/7/2024 1430  Last data filed at 9/7/2024 0900  Gross per 24 hour   Intake 120 ml   Output --   Net 120 ml     Admission weight:  Weight: 64.5 kg (142 lb 3.2 oz)      General: alert, in no apparent distress  HEENT: normocephalic, atraumatic, anicteric  Lungs: non-labored respirations, clear to auscultation bilaterally  Heart: regular rate and rhythm, no murmurs or rubs  Abdomen: soft, non-tender, non-distended  Ext: LLE peripheral edema  Neuro: alert, follows commands        Electronically signed by Feliciano Diaz MD on 9/7/2024 at 2:30 PM            "

## 2024-09-08 VITALS
OXYGEN SATURATION: 96 % | RESPIRATION RATE: 18 BRPM | BODY MASS INDEX: 25.58 KG/M2 | TEMPERATURE: 98.6 F | HEART RATE: 87 BPM | SYSTOLIC BLOOD PRESSURE: 136 MMHG | DIASTOLIC BLOOD PRESSURE: 62 MMHG | HEIGHT: 62 IN | WEIGHT: 139 LBS

## 2024-09-08 LAB
ANION GAP SERPL CALC-SCNC: 11 MMOL/L (ref 10–20)
ANION GAP SERPL CALC-SCNC: 12 MMOL/L (ref 10–20)
BUN SERPL-MCNC: 35 MG/DL (ref 6–23)
BUN SERPL-MCNC: 37 MG/DL (ref 6–23)
CALCIUM SERPL-MCNC: 8.7 MG/DL (ref 8.6–10.3)
CALCIUM SERPL-MCNC: 9.1 MG/DL (ref 8.6–10.3)
CHLORIDE SERPL-SCNC: 94 MMOL/L (ref 98–107)
CHLORIDE SERPL-SCNC: 95 MMOL/L (ref 98–107)
CO2 SERPL-SCNC: 23 MMOL/L (ref 21–32)
CO2 SERPL-SCNC: 23 MMOL/L (ref 21–32)
CREAT SERPL-MCNC: 1.17 MG/DL (ref 0.5–1.05)
CREAT SERPL-MCNC: 1.29 MG/DL (ref 0.5–1.05)
EGFRCR SERPLBLD CKD-EPI 2021: 39 ML/MIN/1.73M*2
EGFRCR SERPLBLD CKD-EPI 2021: 44 ML/MIN/1.73M*2
ERYTHROCYTE [DISTWIDTH] IN BLOOD BY AUTOMATED COUNT: 12.4 % (ref 11.5–14.5)
GLUCOSE SERPL-MCNC: 106 MG/DL (ref 74–99)
GLUCOSE SERPL-MCNC: 90 MG/DL (ref 74–99)
HCT VFR BLD AUTO: 26.2 % (ref 36–46)
HGB BLD-MCNC: 8.7 G/DL (ref 12–16)
MCH RBC QN AUTO: 29.1 PG (ref 26–34)
MCHC RBC AUTO-ENTMCNC: 33.2 G/DL (ref 32–36)
MCV RBC AUTO: 88 FL (ref 80–100)
NRBC BLD-RTO: 0 /100 WBCS (ref 0–0)
PLATELET # BLD AUTO: 451 X10*3/UL (ref 150–450)
POTASSIUM SERPL-SCNC: 4.2 MMOL/L (ref 3.5–5.3)
POTASSIUM SERPL-SCNC: 4.5 MMOL/L (ref 3.5–5.3)
RBC # BLD AUTO: 2.99 X10*6/UL (ref 4–5.2)
SODIUM SERPL-SCNC: 124 MMOL/L (ref 136–145)
SODIUM SERPL-SCNC: 125 MMOL/L (ref 136–145)
WBC # BLD AUTO: 8.8 X10*3/UL (ref 4.4–11.3)

## 2024-09-08 PROCEDURE — 2500000004 HC RX 250 GENERAL PHARMACY W/ HCPCS (ALT 636 FOR OP/ED): Performed by: STUDENT IN AN ORGANIZED HEALTH CARE EDUCATION/TRAINING PROGRAM

## 2024-09-08 PROCEDURE — 36415 COLL VENOUS BLD VENIPUNCTURE: CPT | Performed by: STUDENT IN AN ORGANIZED HEALTH CARE EDUCATION/TRAINING PROGRAM

## 2024-09-08 PROCEDURE — 85027 COMPLETE CBC AUTOMATED: CPT | Performed by: STUDENT IN AN ORGANIZED HEALTH CARE EDUCATION/TRAINING PROGRAM

## 2024-09-08 PROCEDURE — 2500000001 HC RX 250 WO HCPCS SELF ADMINISTERED DRUGS (ALT 637 FOR MEDICARE OP): Performed by: HOSPITALIST

## 2024-09-08 PROCEDURE — 2500000002 HC RX 250 W HCPCS SELF ADMINISTERED DRUGS (ALT 637 FOR MEDICARE OP, ALT 636 FOR OP/ED): Performed by: STUDENT IN AN ORGANIZED HEALTH CARE EDUCATION/TRAINING PROGRAM

## 2024-09-08 PROCEDURE — 2500000001 HC RX 250 WO HCPCS SELF ADMINISTERED DRUGS (ALT 637 FOR MEDICARE OP): Performed by: STUDENT IN AN ORGANIZED HEALTH CARE EDUCATION/TRAINING PROGRAM

## 2024-09-08 PROCEDURE — 1100000001 HC PRIVATE ROOM DAILY

## 2024-09-08 PROCEDURE — 99232 SBSQ HOSP IP/OBS MODERATE 35: CPT | Performed by: STUDENT IN AN ORGANIZED HEALTH CARE EDUCATION/TRAINING PROGRAM

## 2024-09-08 PROCEDURE — 80048 BASIC METABOLIC PNL TOTAL CA: CPT | Performed by: STUDENT IN AN ORGANIZED HEALTH CARE EDUCATION/TRAINING PROGRAM

## 2024-09-08 PROCEDURE — 2500000001 HC RX 250 WO HCPCS SELF ADMINISTERED DRUGS (ALT 637 FOR MEDICARE OP)

## 2024-09-08 PROCEDURE — 2500000001 HC RX 250 WO HCPCS SELF ADMINISTERED DRUGS (ALT 637 FOR MEDICARE OP): Performed by: NURSE PRACTITIONER

## 2024-09-08 PROCEDURE — 2500000005 HC RX 250 GENERAL PHARMACY W/O HCPCS: Performed by: STUDENT IN AN ORGANIZED HEALTH CARE EDUCATION/TRAINING PROGRAM

## 2024-09-08 RX ORDER — AMOXICILLIN 250 MG
2 CAPSULE ORAL 2 TIMES DAILY
Status: CANCELLED
Start: 2024-09-08

## 2024-09-08 RX ORDER — TOLVAPTAN 15 MG/1
15 TABLET ORAL ONCE
Status: COMPLETED | OUTPATIENT
Start: 2024-09-08 | End: 2024-09-08

## 2024-09-08 RX ORDER — LOSARTAN POTASSIUM 25 MG/1
25 TABLET ORAL DAILY
Status: CANCELLED
Start: 2024-09-09

## 2024-09-08 RX ORDER — FAMOTIDINE 20 MG/1
20 TABLET, FILM COATED ORAL DAILY
Status: CANCELLED
Start: 2024-09-09

## 2024-09-08 RX ORDER — SODIUM CHLORIDE 1000 MG
1000 TABLET, SOLUBLE MISCELLANEOUS
Status: CANCELLED
Start: 2024-09-08

## 2024-09-08 ASSESSMENT — COGNITIVE AND FUNCTIONAL STATUS - GENERAL
WALKING IN HOSPITAL ROOM: A LOT
TOILETING: A LITTLE
DRESSING REGULAR UPPER BODY CLOTHING: A LITTLE
PERSONAL GROOMING: A LITTLE
MOBILITY SCORE: 13
TURNING FROM BACK TO SIDE WHILE IN FLAT BAD: A LITTLE
MOVING TO AND FROM BED TO CHAIR: A LOT
MOVING FROM LYING ON BACK TO SITTING ON SIDE OF FLAT BED WITH BEDRAILS: A LITTLE
DRESSING REGULAR LOWER BODY CLOTHING: A LITTLE
HELP NEEDED FOR BATHING: A LITTLE
CLIMB 3 TO 5 STEPS WITH RAILING: TOTAL
DAILY ACTIVITIY SCORE: 19
STANDING UP FROM CHAIR USING ARMS: A LOT

## 2024-09-08 ASSESSMENT — PAIN SCALES - GENERAL: PAINLEVEL_OUTOF10: 3

## 2024-09-08 ASSESSMENT — PAIN - FUNCTIONAL ASSESSMENT: PAIN_FUNCTIONAL_ASSESSMENT: 0-10

## 2024-09-08 NOTE — PROGRESS NOTES
"Renal Progress Note  Assessment and Plan:    89 y.o. yo female admitted with cellulitis.  Has normal EF but has some edema.  Tried on lasix in July but na went to 123 and high k.  Says she isn't taking lasix anymore.  Used to be on hydrochlorothiazide but this was dcd 11/2023.  Has edema and pleural effusions and na in low to mid 120s, baseline Na in the low 130s, cortisol 19 making adrenal insufficiency unlikely. Had some improvement w/ samsca (on 9/1, no response to 2nd dose on 9/2). 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  +1 peripheral edema  Observed worsening GFR, now improving   No drop in the blood pressure     Plan:  Continue salt tabs 1 g BID (started 9/7), stopped urea packets because of the taste   Changed her lisinopril to losartan as this is rare etiology of hyponatremia (9/7)  Repeat BMP later this evening (gave samsca earlier today)       Subjective:   Admit Date: 8/29/2024    Interval History: Na up to 125 now, ordered samsca earlier this AM       Medications:   Scheduled Meds:apixaban, 2.5 mg, oral, q12h  aspirin, 81 mg, oral, Daily  bisacodyl, 10 mg, rectal, Daily  calcium carbonate, 500 mg, oral, Daily  cephalexin, 500 mg, oral, q6h HAILE  famotidine, 20 mg, oral, Daily  isosorbide mononitrate ER, 30 mg, oral, Daily  lactobacillus acidophilus, 1 tablet, oral, BID  lidocaine, 1 patch, transdermal, Daily  losartan, 25 mg, oral, Daily  metoprolol tartrate, 25 mg, oral, q12h  polyethylene glycol, 17 g, oral, Daily  sennosides-docusate sodium, 2 tablet, oral, BID  sodium chloride, 1,000 mg, oral, BID      Continuous Infusions:     CBC:   Lab Results   Component Value Date    HGB 8.7 (L) 09/08/2024    HGB 8.3 (L) 09/07/2024    WBC 8.8 09/08/2024    WBC 9.2 09/07/2024     (H) 09/08/2024     09/07/2024      Anemia:  No results found for: \"FERRITIN\", \"IRON\", \"TIBC\"   BMP:    Lab Results   Component Value Date     " "(L) 09/08/2024     (L) 09/07/2024    K 4.2 09/08/2024    K 4.0 09/07/2024    CL 95 (L) 09/08/2024    CL 94 (L) 09/07/2024    CO2 23 09/08/2024    CO2 21 09/07/2024    BUN 35 (H) 09/08/2024    BUN 35 (H) 09/07/2024    CREATININE 1.17 (H) 09/08/2024    CREATININE 1.25 (H) 09/07/2024      Bone disease:   No results found for: \"PHOS\", \"PTH\", \"VITD25\"     Urinalysis:  No results found for: \"MONET\", \"PROTUR\", \"GLUCOSEU\", \"BLOODU\", \"KETONESU\", \"BILIRUBINU\", \"NITRITEU\", \"LEUKOCYTESU\", \"UTPCR\"     Objective:   Vitals: /61   Pulse 86   Temp 36.2 °C (97.2 °F)   Resp 17   Ht 1.575 m (5' 2.01\")   Wt 63 kg (139 lb)   SpO2 98%   BMI 25.42 kg/m²    Wt Readings from Last 3 Encounters:   08/29/24 63 kg (139 lb)   08/04/24 64.9 kg (143 lb)      24HR INTAKE/OUTPUT:    Intake/Output Summary (Last 24 hours) at 9/8/2024 1345  Last data filed at 9/7/2024 1926  Gross per 24 hour   Intake 550 ml   Output --   Net 550 ml     Admission weight:  Weight: 64.5 kg (142 lb 3.2 oz)      General: alert, in no apparent distress  HEENT: normocephalic, atraumatic, anicteric  Lungs: non-labored respirations, clear to auscultation bilaterally  Heart: regular rate and rhythm, no murmurs or rubs  Abdomen: soft, non-tender, non-distended  Ext: trace LLE peripheral edema  Neuro: alert, follows commands      Electronically signed by Feliciano Diaz MD on 9/8/2024 at 1:45 PM            "

## 2024-09-08 NOTE — PROGRESS NOTES
"  Infectious Disease      History of Present Illness:   Patient 89-year-old female presenting with left knee pain.  She had a hematoma her left knee for 3 weeks previously was improving and then worsening.  She had some pain in her left calf became more swollen more red.  She is feeling better now.  Almost resolution of the erythema present.  The left knee pain had improved a CT scan had showed large left knee hemarthrosis which was similar to present.  Aspiration was attempted but could not take place, as per orthopedics.    Feels ok . No fevers, knee still some pain but can partially weight bear now    Physical Exam:    Blood pressure 106/59, pulse 108, temperature 36.3 °C (97.3 °F), temperature source Temporal, resp. rate 18, height 1.575 m (5' 2.01\"), weight 63 kg (139 lb), SpO2 99%.  General: Patient appears ok at the present time. NAD  Skin: no new rashes  HEENT:  Neck is supple, No subconjunctival hemorrhages, no oral exudates  Heart: S1 S2  Lungs: clear bilaterally  Abdomen: soft, ND, NTTP,  Back :no CVA tenderness  Extrem: No edema, non tender no erythema left lower  Neuro exam: CN II-XII intact  Psych: cooperative    Labs:  I have reviewed all lab results by electronic record, including most recent CBC, metabolic panel, and pertinent abnormalities were addressed from an infectious disease perspective.  Trends are being monitored over time.    Lab Results   Component Value Date    WBC 9.2 09/07/2024    HGB 8.3 (L) 09/07/2024    HCT 24.8 (L) 09/07/2024    MCV 87 09/07/2024     09/07/2024     Lab Results   Component Value Date    GLUCOSE 92 09/07/2024    CALCIUM 8.6 09/07/2024     (L) 09/07/2024    K 4.0 09/07/2024    CO2 21 09/07/2024    CL 94 (L) 09/07/2024    BUN 35 (H) 09/07/2024    CREATININE 1.25 (H) 09/07/2024       Radiology:  I have reviewed imaging results per electronic record and most pertinent abnormalities are being addressed from an infectious disease " standpoint.            ASSESSMENT:  Problem List Items Addressed This Visit          Cardiac and Vasculature    NSTEMI (non-ST elevated myocardial infarction) (Multi)    Relevant Medications    metoprolol tartrate (Lopressor) tablet 25 mg    isosorbide mononitrate ER (Imdur) 60 mg 24 hr tablet    isosorbide mononitrate ER (Imdur) 24 hr tablet 30 mg    Other Relevant Orders    Case Request Cath Lab: Left Heart Cath, Angioplasty - Coronary (Completed)    Cardiac Catheterization Procedure (Completed)       Coag and Thromboembolic    Current use of long term anticoagulation    Relevant Orders    Case Request Cath Lab: Left Heart Cath, Angioplasty - Coronary (Completed)    Cardiac Catheterization Procedure (Completed)    History of DVT (deep vein thrombosis)    Relevant Orders    Case Request Cath Lab: Left Heart Cath, Angioplasty - Coronary (Completed)    Cardiac Catheterization Procedure (Completed)       Endocrine/Metabolic    Hyponatremia    Relevant Orders    Case Request Cath Lab: Left Heart Cath, Angioplasty - Coronary (Completed)    Cardiac Catheterization Procedure (Completed)       Infectious Diseases    * (Principal) Sepsis due to cellulitis (Multi)    Relevant Orders    Case Request Cath Lab: Left Heart Cath, Angioplasty - Coronary (Completed)    Cardiac Catheterization Procedure (Completed)       Musculoskeletal and Injuries    Hemarthrosis - Primary     Other Visit Diagnoses       Cellulitis of left lower extremity        Sepsis without acute organ dysfunction, due to unspecified organism (Multi)                   PLAN:  Patient already on oral therapy transition which is okay will complete total of 10 days for cellulitis.  Patient should follow-up with orthopedics regarding left knee if things worsen off antibiotics this would suggest an infection prior here, but seems less likely with information we have

## 2024-09-08 NOTE — PROGRESS NOTES
PROGRESS NOTE    ASSESSMENT AND PLAN:       #.  Hyponatremia  -Serum sodium downtrended during hospitalization. 131 -> 124. Na 125 today  -Nephro following, recs appreciated. 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  -Patient is continued on fluid restricted diet. Urea was Dc'd and patient started on salt tablets BID. Lisinopril also transitioned to losartan.   -Discussed with Dr Ball. 1 dose of tolvaptan given today. Will repeat BMP and reassess later today    Abdominal pain/constipation  -Symptoms worsened on 9/7. Abd x-ray obtained. Revealed gas-filled, barium coated, mildly dilated colon.   -Bowel regimen escalated and soap suds enema ordered/administered on 9/5. 1 dose of methyl naltrexone also given  -Continuing to pass gas and have bowel movements. Symptoms have improved     #. Troponin elevation  #. Chest pain  -ECG showed no acute ischemic changes  -Troponin was elevated to 667, trended down subsequently.  -CT angio showed negative for pulmonary embolism, showed pleural effusion  -S/p LHC on 9/3. 95% stenosis in the mid LAD with MARIANO I flow and collaterals noted, 50% stenosis in the diagonal artery, 50% stenosis in the dominant mid RCA and an LVEF of 65%.     Plan:  -Continued on aspirin  -Cards followed while in house. Continued medical management advised post C.   -Cont Imdur 30 mg daily     #.  Sepsis secondary to left lower extremity cellulitis  -Presented with leukocytosis and tachycardia, diffuse erythema of left lower extremity without drainage  -Followed by ID who assisted with ATB management while in house.   -Treated with ancef in house and transitioned to PO keflex.   -Cellulitis, overall improved.   -10 day ATB course completed in house    #. Gastroesophageal reflux disease  -Patient was having chest pain with eating.  -GI followed in house. Plan for outpatient EGD. Advised continued supportive care & famotidine 20 mg daily  -S/p esophogram which was negative for mass/mucosal abnormality but did note slowed  "clearance through distal thoracic esophagus.   -Patient is allergic to PPIs, continue famotidine and Tums as needed.    #.  Left knee pain 2/2 hemarthrosis of left knee  -Likely induced by trauma while on Coumadin, Doppler negative for DVT  -CT scan shows a large left knee hemarthrosis not significantly changed from 8/5  -Orthopedic surgery consult, attempted an aspiration but were unable to aspirate.  -Overall improved    #.  Acute hypoxic respiratory failure  #.  Pleural effusion  -symptoms stable    #.  History of DVT in 2021  #   Paroxysmal Afib  #.  Long-term anticoagulation  -Doppler negative for DVT  -Supratherapeutic INR on presentation, now resolved.  -It appears that her DVT was provoked in 2021 when she had COVID-19.  Family will check with her doctor to see if she has any hypercoagulable disease.  If not patient can be discontinued off anticoagulation given that this is her first provoked DVT, and she has a history of multiple falls.    -Will continue on low dose eliquis given risk of falls and hx of afib.     Disposition: DC to SNF pending medical clearance.     SUBJECTIVE:   Admit Date: 8/29/2024    Patient had no acute events overnight. Feels well today. Denies abdominal pain and endorses passage of stool/flatus. Further ROS was unremarkable.     OBJECTIVE:   Vitals: /61   Pulse 86   Temp 36.2 °C (97.2 °F)   Resp 17   Ht 1.575 m (5' 2.01\")   Wt 63 kg (139 lb)   SpO2 98%   BMI 25.42 kg/m²    Wt Readings from Last 3 Encounters:   08/29/24 63 kg (139 lb)   08/04/24 64.9 kg (143 lb)      24HR INTAKE/OUTPUT:    Intake/Output Summary (Last 24 hours) at 9/8/2024 1241  Last data filed at 9/7/2024 1926  Gross per 24 hour   Intake 550 ml   Output --   Net 550 ml         PHYSICAL EXAM:   GENERAL: Well developed elderly female in no acute distress   HEENT: HEAD: Normocephalic atraumatic.  Neck: Supple.   CVS: S1, S2 heard. Regular rate and rhythm  LUNGS: Clear to auscultate bilaterally. No wheezing " "or rhonchi appreciated.  ABDOMEN: Soft, nontender to palpate. Positive bowel sounds. No guarding or rebound appreciated.  NEUROLOGICAL: No focal neurological deficits appreciated. Cranial nerves are grossly intact.  SKIN:  Grossly intact, warm and dry.      LABS/IMAGING AND MEDICATIONS:   Scheduled Meds:apixaban, 2.5 mg, oral, q12h  aspirin, 81 mg, oral, Daily  bisacodyl, 10 mg, rectal, Daily  calcium carbonate, 500 mg, oral, Daily  cephalexin, 500 mg, oral, q6h HAILE  famotidine, 20 mg, oral, Daily  isosorbide mononitrate ER, 30 mg, oral, Daily  lactobacillus acidophilus, 1 tablet, oral, BID  lidocaine, 1 patch, transdermal, Daily  losartan, 25 mg, oral, Daily  metoprolol tartrate, 25 mg, oral, q12h  polyethylene glycol, 17 g, oral, Daily  sennosides-docusate sodium, 2 tablet, oral, BID  sodium chloride, 1,000 mg, oral, BID      PRN Meds:PRN medications: acetaminophen **OR** acetaminophen **OR** acetaminophen, benzonatate, calcium carbonate, guaiFENesin, HYDROmorphone, melatonin, ondansetron **OR** ondansetron, oxyCODONE, oxyCODONE, prochlorperazine **OR** prochlorperazine **OR** prochlorperazine    No lab exists for component: \"CBC\"   No lab exists for component: \"CMP\"   No lab exists for component: \"TROPONIN\"      Results from last 7 days   Lab Units 09/03/24  0353   INR  1.3*     No lab exists for component: \"LIPIDS\"       No lab exists for component: \"URINALYSIS\"          BMP:  Results from last 7 days   Lab Units 09/08/24  0534 09/07/24  0441 09/06/24  0544   SODIUM mmol/L 125* 124* 126*   POTASSIUM mmol/L 4.2 4.0 3.8   CHLORIDE mmol/L 95* 94* 95*   CO2 mmol/L 23 21 21   BUN mg/dL 35* 35* 27*   CREATININE mg/dL 1.17* 1.25* 1.36*       CBC:  Results from last 7 days   Lab Units 09/08/24  0534 09/07/24  0441 09/06/24  0544   WBC AUTO x10*3/uL 8.8 9.2 12.3*   RBC AUTO x10*6/uL 2.99* 2.84* 3.20*   HEMOGLOBIN g/dL 8.7* 8.3* 9.5*   HEMATOCRIT % 26.2* 24.8* 28.0*   MCV fL 88 87 88   MCH pg 29.1 29.2 29.7   MCHC g/dL " "33.2 33.5 33.9   RDW % 12.4 12.3 12.3   PLATELETS AUTO x10*3/uL 451* 424 477*       Cardiac Enzymes:   Results from last 7 days   Lab Units 09/02/24  0411   TROPHS ng/L 211*         Hepatic Function Panel:        No lab exists for component: \"LABALBU\"      Magnesium:  Results from last 7 days   Lab Units 09/03/24  0353   MAGNESIUM mg/dL 1.75       Pro-BNP:  No results found for: \"PROBNP\"    INR:  Results from last 7 days   Lab Units 09/03/24  0353 09/01/24  1311   PROTIME seconds 15.1* 18.2*   INR  1.3* 1.6*       TSH:  No results found for: \"TSH\"      Lipid Profile:  Results from last 7 days   Lab Units 09/03/24  0353   TRIGLYCERIDES mg/dL 82   HDL mg/dL 43.6   LDL CALC mg/dL 71       HgbA1C:        Lactate Level:  No lab exists for component: \"LACTA\"    CMP:  Results from last 7 days   Lab Units 09/08/24  0534 09/07/24  0441 09/06/24  0544   SODIUM mmol/L 125* 124* 126*   POTASSIUM mmol/L 4.2 4.0 3.8   CHLORIDE mmol/L 95* 94* 95*   CO2 mmol/L 23 21 21   BUN mg/dL 35* 35* 27*   CREATININE mg/dL 1.17* 1.25* 1.36*   GLUCOSE mg/dL 90 92 128*   CALCIUM mg/dL 8.7 8.6 9.2       Amylase:        Lipase:        ABG:        No lab exists for component: \"PO2\", \"PCO2\", \"HCO3\", \"BE\", \"O2SAT\"       "

## 2024-09-09 ENCOUNTER — HOME HEALTH ADMISSION (OUTPATIENT)
Dept: HOME HEALTH SERVICES | Facility: HOME HEALTH | Age: 89
End: 2024-09-09
Payer: MEDICARE

## 2024-09-09 VITALS
WEIGHT: 139 LBS | SYSTOLIC BLOOD PRESSURE: 143 MMHG | TEMPERATURE: 97.3 F | DIASTOLIC BLOOD PRESSURE: 63 MMHG | HEART RATE: 86 BPM | HEIGHT: 62 IN | BODY MASS INDEX: 25.58 KG/M2 | RESPIRATION RATE: 18 BRPM | OXYGEN SATURATION: 99 %

## 2024-09-09 LAB
ANION GAP SERPL CALC-SCNC: 12 MMOL/L (ref 10–20)
BUN SERPL-MCNC: 28 MG/DL (ref 6–23)
CALCIUM SERPL-MCNC: 9 MG/DL (ref 8.6–10.3)
CHLORIDE SERPL-SCNC: 102 MMOL/L (ref 98–107)
CO2 SERPL-SCNC: 22 MMOL/L (ref 21–32)
CREAT SERPL-MCNC: 0.98 MG/DL (ref 0.5–1.05)
EGFRCR SERPLBLD CKD-EPI 2021: 55 ML/MIN/1.73M*2
ERYTHROCYTE [DISTWIDTH] IN BLOOD BY AUTOMATED COUNT: 12.7 % (ref 11.5–14.5)
GLUCOSE SERPL-MCNC: 96 MG/DL (ref 74–99)
HCT VFR BLD AUTO: 26.7 % (ref 36–46)
HGB BLD-MCNC: 8.8 G/DL (ref 12–16)
MCH RBC QN AUTO: 29.3 PG (ref 26–34)
MCHC RBC AUTO-ENTMCNC: 33 G/DL (ref 32–36)
MCV RBC AUTO: 89 FL (ref 80–100)
NRBC BLD-RTO: 0 /100 WBCS (ref 0–0)
PLATELET # BLD AUTO: 445 X10*3/UL (ref 150–450)
POTASSIUM SERPL-SCNC: 4.3 MMOL/L (ref 3.5–5.3)
RBC # BLD AUTO: 3 X10*6/UL (ref 4–5.2)
SODIUM SERPL-SCNC: 132 MMOL/L (ref 136–145)
WBC # BLD AUTO: 9 X10*3/UL (ref 4.4–11.3)

## 2024-09-09 PROCEDURE — 2500000001 HC RX 250 WO HCPCS SELF ADMINISTERED DRUGS (ALT 637 FOR MEDICARE OP): Performed by: NURSE PRACTITIONER

## 2024-09-09 PROCEDURE — 2500000004 HC RX 250 GENERAL PHARMACY W/ HCPCS (ALT 636 FOR OP/ED): Performed by: STUDENT IN AN ORGANIZED HEALTH CARE EDUCATION/TRAINING PROGRAM

## 2024-09-09 PROCEDURE — 36415 COLL VENOUS BLD VENIPUNCTURE: CPT | Performed by: STUDENT IN AN ORGANIZED HEALTH CARE EDUCATION/TRAINING PROGRAM

## 2024-09-09 PROCEDURE — 2500000001 HC RX 250 WO HCPCS SELF ADMINISTERED DRUGS (ALT 637 FOR MEDICARE OP): Performed by: INTERNAL MEDICINE

## 2024-09-09 PROCEDURE — 97535 SELF CARE MNGMENT TRAINING: CPT | Mod: GO

## 2024-09-09 PROCEDURE — 99239 HOSP IP/OBS DSCHRG MGMT >30: CPT | Performed by: STUDENT IN AN ORGANIZED HEALTH CARE EDUCATION/TRAINING PROGRAM

## 2024-09-09 PROCEDURE — 2500000001 HC RX 250 WO HCPCS SELF ADMINISTERED DRUGS (ALT 637 FOR MEDICARE OP): Performed by: HOSPITALIST

## 2024-09-09 PROCEDURE — 85027 COMPLETE CBC AUTOMATED: CPT | Performed by: STUDENT IN AN ORGANIZED HEALTH CARE EDUCATION/TRAINING PROGRAM

## 2024-09-09 PROCEDURE — 2500000005 HC RX 250 GENERAL PHARMACY W/O HCPCS: Performed by: STUDENT IN AN ORGANIZED HEALTH CARE EDUCATION/TRAINING PROGRAM

## 2024-09-09 PROCEDURE — 80048 BASIC METABOLIC PNL TOTAL CA: CPT | Performed by: STUDENT IN AN ORGANIZED HEALTH CARE EDUCATION/TRAINING PROGRAM

## 2024-09-09 PROCEDURE — 2500000001 HC RX 250 WO HCPCS SELF ADMINISTERED DRUGS (ALT 637 FOR MEDICARE OP): Performed by: STUDENT IN AN ORGANIZED HEALTH CARE EDUCATION/TRAINING PROGRAM

## 2024-09-09 RX ORDER — AMOXICILLIN 250 MG
2 CAPSULE ORAL 2 TIMES DAILY
Qty: 120 TABLET | Refills: 2 | Status: SHIPPED | OUTPATIENT
Start: 2024-09-09 | End: 2024-12-08

## 2024-09-09 RX ORDER — METOPROLOL TARTRATE 50 MG/1
50 TABLET ORAL EVERY 12 HOURS
Status: DISCONTINUED | OUTPATIENT
Start: 2024-09-09 | End: 2024-09-09 | Stop reason: HOSPADM

## 2024-09-09 RX ORDER — FAMOTIDINE 20 MG/1
20 TABLET, FILM COATED ORAL DAILY
Qty: 30 TABLET | Refills: 2 | Status: SHIPPED | OUTPATIENT
Start: 2024-09-10 | End: 2024-12-09

## 2024-09-09 RX ORDER — LOSARTAN POTASSIUM 25 MG/1
25 TABLET ORAL DAILY
Qty: 30 TABLET | Refills: 2 | Status: SHIPPED | OUTPATIENT
Start: 2024-09-10 | End: 2024-12-09

## 2024-09-09 RX ORDER — SODIUM CHLORIDE 1000 MG
1000 TABLET, SOLUBLE MISCELLANEOUS
Qty: 60 TABLET | Refills: 2 | Status: SHIPPED | OUTPATIENT
Start: 2024-09-09 | End: 2024-12-08

## 2024-09-09 ASSESSMENT — COGNITIVE AND FUNCTIONAL STATUS - GENERAL
STANDING UP FROM CHAIR USING ARMS: A LOT
WALKING IN HOSPITAL ROOM: A LITTLE
WALKING IN HOSPITAL ROOM: A LITTLE
DRESSING REGULAR LOWER BODY CLOTHING: A LITTLE
MOBILITY SCORE: 17
DRESSING REGULAR UPPER BODY CLOTHING: A LITTLE
DRESSING REGULAR LOWER BODY CLOTHING: A LITTLE
CLIMB 3 TO 5 STEPS WITH RAILING: TOTAL
DAILY ACTIVITIY SCORE: 19
HELP NEEDED FOR BATHING: A LITTLE
TOILETING: A LITTLE
MOVING TO AND FROM BED TO CHAIR: A LITTLE
CLIMB 3 TO 5 STEPS WITH RAILING: A LOT
TURNING FROM BACK TO SIDE WHILE IN FLAT BAD: A LITTLE
TURNING FROM BACK TO SIDE WHILE IN FLAT BAD: A LITTLE
TOILETING: A LITTLE
MOVING FROM LYING ON BACK TO SITTING ON SIDE OF FLAT BED WITH BEDRAILS: A LITTLE
HELP NEEDED FOR BATHING: A LITTLE
PERSONAL GROOMING: A LITTLE
MOVING TO AND FROM BED TO CHAIR: A LOT
MOBILITY SCORE: 14
DAILY ACTIVITIY SCORE: 19
MOVING FROM LYING ON BACK TO SITTING ON SIDE OF FLAT BED WITH BEDRAILS: A LITTLE
STANDING UP FROM CHAIR USING ARMS: A LITTLE
DRESSING REGULAR UPPER BODY CLOTHING: A LITTLE
PERSONAL GROOMING: A LITTLE

## 2024-09-09 ASSESSMENT — ACTIVITIES OF DAILY LIVING (ADL): HOME_MANAGEMENT_TIME_ENTRY: 14

## 2024-09-09 ASSESSMENT — PAIN - FUNCTIONAL ASSESSMENT
PAIN_FUNCTIONAL_ASSESSMENT: 0-10

## 2024-09-09 ASSESSMENT — PAIN DESCRIPTION - DESCRIPTORS: DESCRIPTORS: DULL;ACHING

## 2024-09-09 ASSESSMENT — PAIN SCALES - GENERAL
PAINLEVEL_OUTOF10: 10 - WORST POSSIBLE PAIN
PAINLEVEL_OUTOF10: 3
PAINLEVEL_OUTOF10: 0 - NO PAIN
PAINLEVEL_OUTOF10: 7
PAINLEVEL_OUTOF10: 5 - MODERATE PAIN

## 2024-09-09 ASSESSMENT — PAIN DESCRIPTION - LOCATION: LOCATION: KNEE

## 2024-09-09 ASSESSMENT — PAIN DESCRIPTION - ORIENTATION: ORIENTATION: LEFT

## 2024-09-09 NOTE — PROGRESS NOTES
Spoke with dttianna zamora am who states therapy was in working with pt. And doing much better they now prefer pt. To return home with hc and has caregiver/friend living with her.  Her pcp is dr. Hanna mccarthy, last saw is July.  She is unsure of hc agency , states needs to speak with sister.   Informed remote tcc-linda of change in plan.   No

## 2024-09-09 NOTE — PROGRESS NOTES
Physical Therapy    Physical Therapy Treatment    Patient Name: Sommer Farmer  MRN: [id number]  Today's Date: 9/9/2024  Time Calculation  Start Time: 0901  Stop Time: 0927  Time Calculation (min): 26 min     806/806-A    Assessment/Plan   PT Assessment  PT Assessment Results: Decreased mobility, Decreased strength  Rehab Prognosis: Good  Evaluation/Treatment Tolerance: Patient tolerated treatment well  Medical Staff Made Aware: Yes  End of Session Communication: Bedside nurse  Assessment Comment: pt would benefit from continued therapy to improved functional mobility and safety (pt is receptive to homecare)  End of Session Patient Position: Alarm on, Up in chair    PT Plan  Inpatient/Swing Bed or Outpatient: Inpatient  Treatment/Interventions: Transfer training, Gait training, Strengthening, Therapeutic exercise  PT Plan: Ongoing PT  PT Frequency: 3 times per week  PT Discharge Recommendations: Moderate intensity level of continued care  Equipment Recommended upon Discharge: Wheeled walker   PT Recommended Transfer Status: assist x 1 with WW    General Visit Information:   PT  Visit  PT Received On: 09/09/24  General  Reason for Referral: impaired mobility  Family/Caregiver Present: Yes  Caregiver Feedback: daughter and grandaughtr present and supportive  Prior to Session Communication: Bedside nurse (cleared to participate)  Patient Position Received: Bed, 3 rail up, Alarm off, not on at start of session  General Comment: agreeable to particpate, states she is feeling better. reports she has been walking in the halls with family all weekend long and that she would like to go  home instread of SNF    General Observations:   General Observation: tele    Subjective     Precautions:  Precautions  UE Weight Bearing Status:  (L UE limb alert)  LE Weight Bearing Status: Weight Bearing as Tolerated  Medical Precautions: Fall precautions  Braces Applied: KI PRN (KI in room , pt declines using , stating it is more  "unconfortable with the KI in place. reports that her pain is \"much better\")  Precautions Comment: KI donned by therapist at start of session and worn for entire session.    Vital Signs:     Objective     Pain:  Pain Assessment  Pain Assessment: 0-10  0-10 (Numeric) Pain Score: 5 - Moderate pain  Pain Type: Acute pain  Pain Location: Knee  Pain Orientation: Left  Pain Interventions: Ambulation/increased activity (reports ambulation helps with pain and stiffness, declining cold pack  stating \"it doesnt hurt that bad\")    Cognition:  Cognition  Overall Cognitive Status: Within Functional Limits  Orientation Level: Oriented X4  Activity Tolerance:  Activity Tolerance  Endurance: Endurance does not limit participation in activity    Treatments:    Bed Mobility  Bed Mobility: Yes  Bed Mobility 1  Bed Mobility 1: Supine to sitting  Bed Mobility Comments 1: transfers supine to sit with SBA and head  of bed elevated  Ambulation/Gait Training  Ambulation/Gait Training Performed: Yes  Ambulation/Gait Training 1  Surface 1: Level tile  Device 1: Rolling walker  Comments/Distance (ft) 1: ambulating 50 ft x 2 with WW and CGA using slow slightly uneven gait with infrequent vc for upright posture  Transfers  Transfer: Yes  Transfer 1  Technique 1: Sit to stand, Stand to sit  Trials/Comments 1: transfers sit <--> stand with WW and SBA using good safety awareness  Stairs  Stairs: No       Outcome Measures:     [facility] Basic Mobility  Turning from your back to your side while in a flat bed without using bedrails: A little  Moving from lying on your back to sitting on the side of a flat bed without using bedrails: A little  Moving to and from bed to chair (including a wheelchair): A little  Standing up from a chair using your arms (e.g. wheelchair or bedside chair): A little  To walk in hospital room: A little  Climbing 3-5 steps with railing: A lot  Basic Mobility - Total Score: 17            EDUCATION:    Individual(s) Educated: " Patient (daughter and grandaugher)  Education Provided: Fall Risk, Home Exercise Program, Home Safety  Patient Response to Education: Patient/Caregiver Verbalized Understanding of Information  Education Comment: instructed in use of and issued WW for home    Encounter Problems       Encounter Problems (Active)       Impaired mobility        Perform all bed mobility with minAx1 (Met)       Start:  08/30/24    Expected End:  09/13/24    Resolved:  09/09/24         Perform all transfers with ww and minAx1 (Met)       Start:  08/30/24    Expected End:  09/13/24    Resolved:  09/09/24         Patient will ambulate 25 ft with ww and minAx1 (Met)       Start:  08/30/24    Expected End:  09/13/24    Resolved:  09/09/24         Patient will perform BLE HEP with supervision to improve strength and L knee ROM  (Progressing)       Start:  08/30/24    Expected End:  09/13/24            Patient will tolerate sitting EOB >/= 5 min with supervision  (Progressing)       Start:  08/30/24    Expected End:  09/13/24               Pain - Adult

## 2024-09-09 NOTE — DISCHARGE SUMMARY
Medical Group Discharge Summary  DISCHARGE DIAGNOSIS     Hyponatremia  Abdominal pain/constipation  NSTEMI  Chest pain  Sepsis secondary to left lower extremity cellulitis  Gastroesophageal reflux disease  Left knee pain 2/2 hemarthrosis of left knee  Acute hypoxic respiratory failure  Pleural effusion  History of DVT in 2021  Paroxysmal Afib  Long-term anticoagulation        HOSPITAL COURSE AND DETAILS     Sommer Farmer is a 89 y.o. female who presented to hospital due to concerns of knee pain x 3 weeks. Patient reported that she has had a hematoma in her left knee for the last 3 weeks.       #.  Left knee pain 2/2 hemarthrosis of left knee  -Noted to have left knee hematoma on presentation  -Suspcted to be induced by trauma while on Coumadin, Doppler negative for DVT. AC was placed on hold during hospitaliation and later resumed with expansion/bleeding noted.  -CT scan showed a large left knee hemarthrosis not significantly changed from 8/5  -Orthopedic surgery was consulted, attempted an aspirate but were unable. Continued supportive care advised-Overall improved    #.  Sepsis secondary to left lower extremity cellulitis  -Presented with leukocytosis and tachycardia, diffuse erythema of left lower extremity without drainage  -Followed by ID who assisted with ATB management while in house.   -Treated with ancef in house and transitioned to PO keflex.   -Cellulitis, overall improved.   -10 day ATB course completed in house    #. Troponin elevation  #. Chest pain  -Noted to have troponin elevatoins on presentation 674 -> 602 -> 211  -EKGs were negative for acute ischemic changes  -CT angio showed negative for pulmonary embolism, showed pleural effusion  -Cards was consulted and followed while in house.  -S/p LHC on 9/3. 95% stenosis in the mid LAD with MARIANO I flow and collaterals noted, 50% stenosis in the diagonal artery, 50% stenosis in the dominant mid RCA and an LVEF of 65%.   -Continued medical  management was advised. Continued on aspirin. Imdur 30 mg daily added to medication regimen. To follow up with cards on DC.     Hyponatremia  -Serum sodium downtrended during hospitalization. 131 -> 124.  -Nephro followed while in house. 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  -Patient was continued on fluid restricted diet. Urea was Dc'd and patient started on salt tablets BID. Lisinopril also transitioned to losartan as rare cause of SIADH.    -Patient's sodium eventually returned to baseline. Cleared for DC from nephro perspective. To repeat BMP in 2-3 days post DC and follow-up with nephrology in 1-2 weeks. Scheduling information provided on DC.      Abdominal pain/constipation  -Patient had worseing abd pain in house. Found to be constipated. Symptoms relieved with bowel regimen. Senna prescribed on DC.      #. Gastroesophageal reflux disease  -Patient was having chest pain with eating.  -GI followed while in house. Currently plan for outpatient EGD. Advised continued supportive care & famotidine 20 mg daily  -S/p esophogram which was negative for mass/mucosal abnormality but did note slowed clearance through distal thoracic esophagus.   -Patient is allergic to PPIs, continue famotidine and Tums as needed. Prescription for famotidine provided     #.  Acute hypoxic respiratory failure  #.  Pleural effusion  -symptoms stable     #.  History of DVT in 2021  #   Paroxysmal Afib  #.  Long-term anticoagulation  -Doppler negative for DVT  -Supratherapeutic INR on presentation, now resolved.  -It appears that her DVT was provoked in 2021 when she had COVID-19.  Family will check with her doctor to see if she has any hypercoagulable disease.  If not patient can be discontinued off anticoagulation given that this is her first provoked DVT, and she has a history of multiple falls.    -Will continue on low dose eliquis given risk of falls and hx of afib. Prescription provided on DC      Patient was in stable condition on day of  discharge no acute concerns.  Feels much better.    40 minutes spent on discharge. Time calculated includes outpatient care coordination, bedside education, and counselling.       Vinicius Mckinney MD    DISCHARGE PHYSICAL EXAM     Last Recorded Vitals:  Vitals:    09/08/24 2357 09/09/24 0420 09/09/24 0754 09/09/24 1122   BP: 136/62 170/72 151/72 143/63   BP Location: Right arm Right arm Right arm    Patient Position: Lying Lying Sitting    Pulse: 87 96 91 86   Resp: 18 18 18    Temp: 37 °C (98.6 °F) 37.2 °C (99 °F) 36.7 °C (98.1 °F) 36.3 °C (97.3 °F)   TempSrc: Temporal Temporal Temporal    SpO2: 96% 97% 96% 99%   Weight:       Height:           Physical Exam  GENERAL: Well developed elderly female in no acute distress   HEENT: HEAD: Normocephalic atraumatic.  Neck: Supple.   CVS: S1, S2 heard. Regular rate and rhythm  LUNGS: Clear to auscultate bilaterally. No wheezing or rhonchi appreciated.  ABDOMEN: Soft, nontender to palpate. Positive bowel sounds. No guarding or rebound appreciated.  NEUROLOGICAL: No focal neurological deficits appreciated. Cranial nerves are grossly intact.  SKIN:  Grossly intact, warm and dry.       DISCHARGE MEDICATIONS        Your medication list        START taking these medications        Instructions Last Dose Given Next Dose Due   apixaban 2.5 mg tablet  Commonly known as: Eliquis      Take 1 tablet (2.5 mg) by mouth every 12 hours.       famotidine 20 mg tablet  Commonly known as: Pepcid  Start taking on: September 10, 2024      Take 1 tablet (20 mg) by mouth once daily.       isosorbide mononitrate ER 60 mg 24 hr tablet  Commonly known as: Imdur      Take 1 tablet (60 mg) by mouth once daily. Do not crush or chew.       losartan 25 mg tablet  Commonly known as: Cozaar  Start taking on: September 10, 2024      Take 1 tablet (25 mg) by mouth once daily.       sennosides-docusate sodium 8.6-50 mg tablet  Commonly known as: Sarita-Colace      Take 2 tablets by mouth 2 times a day.        sodium chloride 1,000 mg tablet      Take 1 tablet (1 g) by mouth 2 times daily (morning and late afternoon).              CONTINUE taking these medications        Instructions Last Dose Given Next Dose Due   aspirin 81 mg chewable tablet           cyclobenzaprine 10 mg tablet  Commonly known as: Flexeril           diphenhydrAMINE-acetaminophen  mg per tablet  Commonly known as: Tylenol PM           furosemide 20 mg tablet  Commonly known as: Lasix           methIMAzole 5 mg tablet  Commonly known as: Tapazole           metoprolol tartrate 25 mg tablet  Commonly known as: Lopressor           traMADol 50 mg tablet  Commonly known as: Ultram                  STOP taking these medications      cephalexin 500 mg capsule  Commonly known as: Keflex        lisinopril 20 mg tablet        raNITIdine 150 mg tablet  Commonly known as: Zantac        warfarin 2.5 mg tablet  Commonly known as: Coumadin        warfarin 5 mg tablet  Commonly known as: Coumadin                  Where to Get Your Medications        These medications were sent to [facility]/pharmacy #[id number] [address] AT [place] [id number]      Phone: [phone]   apixaban 2.5 mg tablet  famotidine 20 mg tablet  isosorbide mononitrate ER 60 mg 24 hr tablet  losartan 25 mg tablet  sennosides-docusate sodium 8.6-50 mg tablet  sodium chloride 1,000 mg tablet           OUTPATIENT FOLLOW-UP     No future appointments.

## 2024-09-09 NOTE — PROGRESS NOTES
Occupational Therapy    Occupational Therapy Treatment    Name: Sommer Farmer  MRN: [id number]  : 1933  Date: 24  Time Calculation  Start Time: 1218  Stop Time: 1232  Time Calculation (min): 14 min    Assessment:  End of Session Patient Position: Up in chair, Alarm off, not on at start of session (daughter present)  Plan:  Treatment Interventions: ADL retraining, Functional transfer training, Endurance training, Patient/family training, Equipment evaluation/education, Compensatory technique education  OT Frequency: 3 times per week  OT Discharge Recommendations: Low intensity level of continued care  OT - OK to Discharge: Yes (when medically appropriate)    Subjective   Previous Visit Info:  OT Last Visit  OT Received On: 24  General:  General  Prior to Session Communication: Bedside nurse  Patient Position Received: Up in chair, Alarm off, not on at start of session (Daughter present, and states that they have been walking  in the halls over the weekend with pt. using FWW.)  General Comment: Pt. agreeable to therapy.  Precautions:  LE Weight Bearing Status: Weight Bearing as Tolerated  Medical Precautions: Fall precautions  Braces Applied: L knee immobilizer as needed.  Pt. prefers not to wear her KI, and family states that they have taken it home.    Pain Assessment:  Pain Assessment  Pain Assessment: 0-10  0-10 (Numeric) Pain Score: 3  Pain Type: Acute pain  Pain Location: Knee  Pain Orientation: Left  Pain Descriptors: Dull, Aching  Pain Interventions: Ambulation/increased activity, Repositioned     Objective   Cognition:  Overall Cognitive Status: Within Functional Limits  Orientation Level: Oriented X4  Activities of Daily Living:      Grooming  Grooming Level of Assistance: Close supervision  Grooming Comments: Good (-) dynamic standing balance at sink with FWW during hand and oral hygiene.    Functional Standing Tolerance:  Functional Standing Tolerance  Functional Standing Tolerance  Comments: Pt. stood at sink for ~ 4 mins with FWW.  Bed Mobility/Transfers:      Transfers  Transfer:  (Sit<>stand at recliner and toilet at supervision level with FWW.)    Functional Mobility:  Functional Mobility  Functional Mobility Performed:  (Ambulated between tasks in room (~ 30') with FWW at supervision level.  No LOB.)    Outcome Measures:  [facility] Daily Activity  Putting on and taking off regular lower body clothing: A little  Bathing (including washing, rinsing, drying): A little  Putting on and taking off regular upper body clothing: A little  Toileting, which includes using toilet, bedpan or urinal: A little  Taking care of personal grooming such as brushing teeth: A little  Eating Meals: None  Daily Activity - Total Score: 19        Education Documentation  Body Mechanics, taught by Cass Pichardo OT at 9/9/2024  1:04 PM.  Learner: Patient  Readiness: Acceptance  Method: Explanation  Response: Verbalizes Understanding    Precautions, taught by Cass Pichardo OT at 9/9/2024  1:04 PM.  Learner: Patient  Readiness: Acceptance  Method: Explanation  Response: Verbalizes Understanding    ADL Training, taught by Cass Pichardo OT at 9/9/2024  1:04 PM.  Learner: Patient  Readiness: Acceptance  Method: Explanation  Response: Verbalizes Understanding    Education Comments  No comments found.      Goals:  Encounter Problems       Encounter Problems (Active)       OT Goals       Tolerate 10 mins light functional activity.  (Progressing)       Start:  08/30/24    Expected End:  09/23/24               OT Goals       Mod I bed mobility.        Start:  09/09/24    Expected End:  09/23/24            Mod I sit/stand, bed/chair/commode with FWW.        Start:  09/09/24    Expected End:  09/23/24            Mod I ADL functional mobility with FWW.        Start:  09/09/24    Expected End:  09/23/24               OT Goals       Mod I LB dressing.        Start:  09/09/24    Expected End:  09/23/24

## 2024-09-09 NOTE — PROGRESS NOTES
Renal Progress Note  Assessment and Plan:    89 y.o. yo female admitted with cellulitis.  Has normal EF but has some edema.  Tried on lasix in July but na went to 123 and high k.  Says she isn't taking lasix anymore.  Used to be on hydrochlorothiazide but this was dcd 11/2023.  Has edema and pleural effusions and na in low to mid 120s, baseline Na in the low 130s, cortisol 19 making adrenal insufficiency unlikely. Had some improvement w/ samsca (on 9/1, no response to 2nd dose on 9/2). 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  Observed worsening GFR, now improving w/ eGFR up to 55 ml/min      Plan:  Continue salt tabs 1 g BID (started 9/7), stopped urea packets because of the taste   Changed her lisinopril to losartan as this is rare etiology of hyponatremia (9/7)  Increased metoprolol to 50 mg PO BID   Na now up to low 130s, ok to discharge from renal standpoint w/ follow up in 1-2 weeks and BMP later this week (wed or thurs), fax to [phone]        Subjective:   Admit Date: 8/29/2024    Interval History: Na up to 132, got samsca yesterday AM, function also improving as well\       Medications:   Scheduled Meds:apixaban, 2.5 mg, oral, q12h  aspirin, 81 mg, oral, Daily  bisacodyl, 10 mg, rectal, Daily  calcium carbonate, 500 mg, oral, Daily  famotidine, 20 mg, oral, Daily  isosorbide mononitrate ER, 30 mg, oral, Daily  lactobacillus acidophilus, 1 tablet, oral, BID  lidocaine, 1 patch, transdermal, Daily  losartan, 25 mg, oral, Daily  metoprolol tartrate, 50 mg, oral, q12h  polyethylene glycol, 17 g, oral, Daily  sennosides-docusate sodium, 2 tablet, oral, BID  sodium chloride, 1,000 mg, oral, BID      Continuous Infusions:     CBC:   Lab Results   Component Value Date    HGB 8.8 (L) 09/09/2024    HGB 8.7 (L) 09/08/2024    WBC 9.0 09/09/2024    WBC 8.8 09/08/2024     09/09/2024     (H) 09/08/2024      Anemia:  No results  "found for: \"FERRITIN\", \"IRON\", \"TIBC\"   BMP:    Lab Results   Component Value Date     (L) 09/09/2024     (L) 09/08/2024    K 4.3 09/09/2024    K 4.5 09/08/2024     09/09/2024    CL 94 (L) 09/08/2024    CO2 22 09/09/2024    CO2 23 09/08/2024    BUN 28 (H) 09/09/2024    BUN 37 (H) 09/08/2024    CREATININE 0.98 09/09/2024    CREATININE 1.29 (H) 09/08/2024      Bone disease:   No results found for: \"PHOS\", \"PTH\", \"VITD25\"     Urinalysis:  No results found for: \"MONET\", \"PROTUR\", \"GLUCOSEU\", \"BLOODU\", \"KETONESU\", \"BILIRUBINU\", \"NITRITEU\", \"LEUKOCYTESU\", \"UTPCR\"     Objective:   Vitals: /72 (BP Location: Right arm, Patient Position: Sitting)   Pulse 91   Temp 36.7 °C (98.1 °F) (Temporal)   Resp 18   Ht 1.575 m (5' 2.01\")   Wt 63 kg (139 lb)   SpO2 96%   BMI 25.42 kg/m²    Wt Readings from Last 3 Encounters:   08/29/24 63 kg (139 lb)   08/04/24 64.9 kg (143 lb)      24HR INTAKE/OUTPUT:    Intake/Output Summary (Last 24 hours) at 9/9/2024 1025  Last data filed at 9/9/2024 0420  Gross per 24 hour   Intake 550 ml   Output 500 ml   Net 50 ml     Admission weight:  Weight: 64.5 kg (142 lb 3.2 oz)      General: alert, in no apparent distress  HEENT: normocephalic, atraumatic, anicteric  Lungs: non-labored respirations, clear to auscultation bilaterally  Heart: regular rate and rhythm, no murmurs or rubs  Abdomen: soft, non-tender, non-distended  Ext: trace LLE peripheral edema  Neuro: alert, follows commands      Electronically signed by Feliciano Diaz MD on 9/9/2024 at 10:25 AM            "

## 2024-09-09 NOTE — PROGRESS NOTES
9/9/2024  Remote coverage:   Received message from social work- Nayana GARCIA Stated pt/dtr would like Home Health Care.   Called into pt's room, Emma Caro answered, pt in backround. They would like HH.  They have a list. (Pt, dtr Areli and grandlida to review).  Grand daughter would like walker for pt. (Stated pt has her 's old one and it does not fit her, nor does it have wheels).    Secure chat to physical therapy asking her to get pt a walker.   CT Team will follow up for HHC choice.   Secure chat to MD updating him on dc plan.   Cass Anand RN [facility]    9/9/2024 1238  Called into room, spoke with Grand daughter Vania, Verified pt's PCP- Dr Hanna Liu- with [facility].  Stated 1st choice is [facility], informed her that [facility] will not accept without a  PCP.  Stated next choice is [facility].    She stated if other choices needed to call Areli.    Referral sent to [facility] via [facility].   Cass Anand Rn [facility]    9/9/2024 1307  Per [facility] not able to accept.  Called and spoke with daughter Areli, introduced self.  Updated her.  Asked for another [facility] choice. Stated she needs to speak with family. Does not feel they will have another choice today- asked to call tomorrow.    Asked that I email her who to call tomorrow- [email or web address].  Secure chat to supervisor asking who she should contact tomorrow.   Cass Anand RN [facility]    9/9/2024 1409  Per supervisor aGbi-- dtr to call her at [phone].  This information emailed to dtr Areli.  Areli to call tomorrow with [facility] choice.   Cass Anand RN [facility]

## 2024-09-10 ENCOUNTER — HOME CARE VISIT (OUTPATIENT)
Dept: HOME HEALTH SERVICES | Facility: HOME HEALTH | Age: 89
End: 2024-09-10
Payer: MEDICARE

## 2024-09-10 ENCOUNTER — PATIENT MESSAGE (OUTPATIENT)
Dept: CARDIOLOGY | Facility: CLINIC | Age: 89
End: 2024-09-10
Payer: MEDICARE

## 2024-09-10 VITALS
SYSTOLIC BLOOD PRESSURE: 105 MMHG | OXYGEN SATURATION: 98 % | DIASTOLIC BLOOD PRESSURE: 67 MMHG | TEMPERATURE: 98.4 F | HEART RATE: 118 BPM | RESPIRATION RATE: 20 BRPM

## 2024-09-10 DIAGNOSIS — Z86.718 HISTORY OF DVT (DEEP VEIN THROMBOSIS): ICD-10-CM

## 2024-09-10 PROCEDURE — G0299 HHS/HOSPICE OF RN EA 15 MIN: HCPCS | Mod: HHH

## 2024-09-10 PROCEDURE — 169592 NO-PAY CLAIM PROCEDURE

## 2024-09-10 ASSESSMENT — ENCOUNTER SYMPTOMS
LOWER EXTREMITY EDEMA: 1
MUSCLE WEAKNESS: 1
PAIN LOCATION: LEFT LEG
CHANGE IN APPETITE: UNCHANGED
PERSON REPORTING PAIN: PATIENT
APPETITE LEVEL: GOOD
PAIN LOCATION - PAIN SEVERITY: 7/10
PAIN: 1
SHORTNESS OF BREATH: 1

## 2024-09-10 ASSESSMENT — ACTIVITIES OF DAILY LIVING (ADL)
OASIS_M1830: 03
ENTERING_EXITING_HOME: NEEDS ASSISTANCE

## 2024-09-11 ENCOUNTER — HOME CARE VISIT (OUTPATIENT)
Dept: HOME HEALTH SERVICES | Facility: HOME HEALTH | Age: 89
End: 2024-09-11
Payer: MEDICARE

## 2024-09-11 VITALS
OXYGEN SATURATION: 100 % | HEART RATE: 80 BPM | DIASTOLIC BLOOD PRESSURE: 65 MMHG | TEMPERATURE: 98.1 F | SYSTOLIC BLOOD PRESSURE: 143 MMHG

## 2024-09-11 PROCEDURE — G0151 HHCP-SERV OF PT,EA 15 MIN: HCPCS | Mod: HHH

## 2024-09-11 ASSESSMENT — ACTIVITIES OF DAILY LIVING (ADL)
AMBULATION_DISTANCE/DURATION_TOLERATED: 65 FT
AMBULATION ASSISTANCE: ONE PERSON
CURRENT_FUNCTION: ONE PERSON
AMBULATION ASSISTANCE ON FLAT SURFACES: 1

## 2024-09-11 ASSESSMENT — ENCOUNTER SYMPTOMS
PAIN LOCATION: LEFT KNEE
LIMITED RANGE OF MOTION: 1
PAIN: 1
PERSON REPORTING PAIN: PATIENT
MUSCLE WEAKNESS: 1
HIGHEST PAIN SEVERITY IN PAST 24 HOURS: 10/10
PAIN LOCATION - PAIN SEVERITY: 7/10

## 2024-09-11 NOTE — HOME HEALTH
Pt is 89 y/o female recently hospitalized 8/29-9/9, initially presenting with c/o L knee pain x3 weeks- dx with hemarthrosis/hematoma 2/2 trauma/fall while on Coumadin. Course of hospitalization c/b sepsis 2/2 LLE cellulitis, tx with antibiotic.    #. 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 Symptoms relieved with bowel regimen. Senna prescribed on DC.     #. Gastroesophageal reflux disease - meds, outpatient EGD      1 fall  granddaughter 24/7  walker now, previously cane  12 steps with B handrails to basement for showering  5 PITA home (2 platform without handrails + 3 steps with B handrails)

## 2024-09-12 ENCOUNTER — HOME CARE VISIT (OUTPATIENT)
Dept: HOME HEALTH SERVICES | Facility: HOME HEALTH | Age: 89
End: 2024-09-12
Payer: MEDICARE

## 2024-09-12 PROCEDURE — 80048 BASIC METABOLIC PNL TOTAL CA: CPT

## 2024-09-12 PROCEDURE — G0299 HHS/HOSPICE OF RN EA 15 MIN: HCPCS | Mod: HHH

## 2024-09-12 PROCEDURE — 85025 COMPLETE CBC W/AUTO DIFF WBC: CPT

## 2024-09-12 SDOH — ECONOMIC STABILITY: GENERAL

## 2024-09-12 ASSESSMENT — ENCOUNTER SYMPTOMS
MUSCLE WEAKNESS: 1
LIMITED RANGE OF MOTION: 1
DENIES PAIN: 1
APPETITE LEVEL: GOOD
LAST BOWEL MOVEMENT: 67095

## 2024-09-12 ASSESSMENT — ACTIVITIES OF DAILY LIVING (ADL)
AMBULATION ASSISTANCE: ONE PERSON
MONEY MANAGEMENT (EXPENSES/BILLS): NEEDS ASSISTANCE
CURRENT_FUNCTION: ONE PERSON
AMBULATION ASSISTANCE: 1
PHYSICAL TRANSFERS ASSESSED: 1
PHYSICAL_TRANSFERS_DEVICES: WALKER

## 2024-09-13 ENCOUNTER — HOME CARE VISIT (OUTPATIENT)
Dept: HOME HEALTH SERVICES | Facility: HOME HEALTH | Age: 89
End: 2024-09-13
Payer: MEDICARE

## 2024-09-13 PROCEDURE — G0157 HHC PT ASSISTANT EA 15: HCPCS | Mod: CQ,HHH

## 2024-09-13 PROCEDURE — G0152 HHCP-SERV OF OT,EA 15 MIN: HCPCS | Mod: HHH

## 2024-09-13 SDOH — HEALTH STABILITY: PHYSICAL HEALTH: EXERCISE TYPE: SEATED AND STANDING THER EX

## 2024-09-13 SDOH — ECONOMIC STABILITY: HOUSING INSECURITY

## 2024-09-13 SDOH — HEALTH STABILITY: PHYSICAL HEALTH: EXERCISE ACTIVITY: AP, LAQ, MARCHING, HIP ABD/ADD

## 2024-09-13 SDOH — HEALTH STABILITY: PHYSICAL HEALTH: EXERCISE ACTIVITIES SETS: 1

## 2024-09-13 SDOH — HEALTH STABILITY: PHYSICAL HEALTH: EXERCISE ACTIVITY: MARCHING, LATERAL WT SHIFTS

## 2024-09-13 SDOH — HEALTH STABILITY: PHYSICAL HEALTH: PHYSICAL EXERCISE: 10

## 2024-09-13 SDOH — HEALTH STABILITY: PHYSICAL HEALTH: PHYSICAL EXERCISE: STANDING

## 2024-09-13 SDOH — HEALTH STABILITY: PHYSICAL HEALTH: EXERCISE COMMENTS: UPDATED HEP

## 2024-09-13 SDOH — HEALTH STABILITY: PHYSICAL HEALTH: PHYSICAL EXERCISE: SEATED

## 2024-09-13 SDOH — ECONOMIC STABILITY: HOUSING INSECURITY: HOME SAFETY: HE BASEMENT.

## 2024-09-13 ASSESSMENT — ACTIVITIES OF DAILY LIVING (ADL)
LAUNDRY ASSESSED: 1
HOUSEKEEPING ASSESSED: 1
DRESSING_LB_CURRENT_FUNCTION: MINIMUM ASSIST
GROOMING ASSESSED: 1
AMBULATION_DISTANCE/DURATION_TOLERATED: 75 FT X2
LIGHT HOUSEKEEPING: DEPENDENT
BATHING ASSESSED: 1
DRESSING_UB_CURRENT_FUNCTION: SUPERVISION
TOILETING: 1
AMBULATION ASSISTANCE: 1
LAUNDRY: DEPENDENT
PREPARING MEALS: DEPENDENT
AMBULATION ASSISTANCE: SUPERVISION
AMBULATION ASSISTANCE ON FLAT SURFACES: 1
TOILETING: MINIMUM ASSIST
GROOMING_CURRENT_FUNCTION: SUPERVISION

## 2024-09-13 ASSESSMENT — ENCOUNTER SYMPTOMS
PAIN LOCATION - PAIN FREQUENCY: CONSTANT
PAIN LOCATION: LEFT KNEE
LOWEST PAIN SEVERITY IN PAST 24 HOURS: 7/10
PAIN LOCATION: LEFT KNEE
PAIN LOCATION - EXACERBATING FACTORS: CHRONIC
PAIN LOCATION - PAIN DURATION: CONSTANT
PAIN LOCATION - PAIN SEVERITY: 10/10
PAIN LOCATION: LEFT LEG
PAIN LOCATION - PAIN SEVERITY: 10/10
PAIN: 1
SUBJECTIVE PAIN PROGRESSION: GRADUALLY IMPROVING
PERSON REPORTING PAIN: PATIENT
PERSON REPORTING PAIN: PATIENT
SUBJECTIVE PAIN PROGRESSION: GRADUALLY IMPROVING
PAIN LOCATION - PAIN SEVERITY: 8/10
HIGHEST PAIN SEVERITY IN PAST 24 HOURS: 10/10
PAIN: 1
PAIN LOCATION - PAIN QUALITY: ACHES
PAIN SEVERITY GOAL: 0/10

## 2024-09-16 ENCOUNTER — HOME CARE VISIT (OUTPATIENT)
Dept: HOME HEALTH SERVICES | Facility: HOME HEALTH | Age: 89
End: 2024-09-16
Payer: MEDICARE

## 2024-09-16 PROCEDURE — G0157 HHC PT ASSISTANT EA 15: HCPCS | Mod: CQ,HHH

## 2024-09-16 SDOH — HEALTH STABILITY: PHYSICAL HEALTH: EXERCISE ACTIVITIES SETS: 1

## 2024-09-16 SDOH — HEALTH STABILITY: PHYSICAL HEALTH: EXERCISE TYPE: SEATED AND STANDING LE STRENGTHENING

## 2024-09-16 SDOH — HEALTH STABILITY: PHYSICAL HEALTH: PHYSICAL EXERCISE: STANDING

## 2024-09-16 SDOH — HEALTH STABILITY: PHYSICAL HEALTH: PHYSICAL EXERCISE: SEATED

## 2024-09-16 ASSESSMENT — ACTIVITIES OF DAILY LIVING (ADL)
AMBULATION_DISTANCE/DURATION_TOLERATED: 75
AMBULATION ASSISTANCE ON FLAT SURFACES: 1

## 2024-09-16 ASSESSMENT — ENCOUNTER SYMPTOMS
PERSON REPORTING PAIN: PATIENT
PAIN: 1
PAIN LOCATION - PAIN SEVERITY: 7/10
PAIN LOCATION: LEFT KNEE

## 2024-09-17 ENCOUNTER — HOME CARE VISIT (OUTPATIENT)
Dept: HOME HEALTH SERVICES | Facility: HOME HEALTH | Age: 89
End: 2024-09-17
Payer: MEDICARE

## 2024-09-17 VITALS
DIASTOLIC BLOOD PRESSURE: 63 MMHG | OXYGEN SATURATION: 100 % | SYSTOLIC BLOOD PRESSURE: 124 MMHG | RESPIRATION RATE: 20 BRPM | HEART RATE: 86 BPM | TEMPERATURE: 98 F

## 2024-09-17 PROCEDURE — G0299 HHS/HOSPICE OF RN EA 15 MIN: HCPCS | Mod: HHH

## 2024-09-17 PROCEDURE — G0158 HHC OT ASSISTANT EA 15: HCPCS | Mod: CO,HHH

## 2024-09-17 PROCEDURE — G0156 HHCP-SVS OF AIDE,EA 15 MIN: HCPCS | Mod: HHH

## 2024-09-17 SDOH — HEALTH STABILITY: PHYSICAL HEALTH: EXERCISE ACTIVITY: COMPONENTS OF TRANSFERS

## 2024-09-17 SDOH — HEALTH STABILITY: PHYSICAL HEALTH: PHYSICAL EXERCISE: 5

## 2024-09-17 SDOH — HEALTH STABILITY: PHYSICAL HEALTH: EXERCISE ACTIVITY: HRTR, MARCHING, LAQ, HIP ABD/ADD

## 2024-09-17 SDOH — HEALTH STABILITY: PHYSICAL HEALTH: PHYSICAL EXERCISE: 10

## 2024-09-17 ASSESSMENT — ENCOUNTER SYMPTOMS
AGGRESSION WITHIN DEFINED LIMITS: 1
PAIN LOCATION - RELIEVING FACTORS: MOVEMENT
PAIN LOCATION - PAIN QUALITY: ACHE
LOWEST PAIN SEVERITY IN PAST 24 HOURS: 6/10
CHANGE IN APPETITE: UNCHANGED
PAIN LOCATION - PAIN FREQUENCY: INTERMITTENT
PAIN: 1
MUSCLE WEAKNESS: 1
PAIN LOCATION - PAIN SEVERITY: 8/10
APPETITE LEVEL: GOOD
PAIN LOCATION: LEFT LEG
PAIN LOCATION - PAIN SEVERITY: 10/10
HIGHEST PAIN SEVERITY IN PAST 24 HOURS: 9/10
PAIN SEVERITY GOAL: 0/10
LOWER EXTREMITY EDEMA: 1
PAIN: 1
PAIN LOCATION: LEFT LEG
SUBJECTIVE PAIN PROGRESSION: WAXING AND WANING
ANGER WITHIN DEFINED LIMITS: 1
PERSON REPORTING PAIN: PATIENT
PERSON REPORTING PAIN: PATIENT

## 2024-09-17 ASSESSMENT — PAIN SCALES - PAIN ASSESSMENT IN ADVANCED DEMENTIA (PAINAD)
BODYLANGUAGE: 0 - RELAXED.
FACIALEXPRESSION: 0 - SMILING OR INEXPRESSIVE.
NEGVOCALIZATION: 0 - NONE.
NEGVOCALIZATION: 0
CONSOLABILITY: 0 - NO NEED TO CONSOLE.
CONSOLABILITY: 0
TOTALSCORE: 0
FACIALEXPRESSION: 0
BODYLANGUAGE: 0
BREATHING: 0

## 2024-09-18 ENCOUNTER — HOME CARE VISIT (OUTPATIENT)
Dept: HOME HEALTH SERVICES | Facility: HOME HEALTH | Age: 89
End: 2024-09-18
Payer: MEDICARE

## 2024-09-18 PROCEDURE — G0157 HHC PT ASSISTANT EA 15: HCPCS | Mod: CQ,HHH

## 2024-09-18 SDOH — HEALTH STABILITY: PHYSICAL HEALTH: EXERCISE TYPE: COMPONENTS OF TRANSFERS

## 2024-09-18 SDOH — HEALTH STABILITY: PHYSICAL HEALTH: EXERCISE ACTIVITY: ANTERIOR LEAN, PRESS UP, SIT STAND

## 2024-09-18 SDOH — HEALTH STABILITY: PHYSICAL HEALTH: PHYSICAL EXERCISE: 5

## 2024-09-18 SDOH — HEALTH STABILITY: PHYSICAL HEALTH: EXERCISE ACTIVITIES SETS: 1

## 2024-09-18 ASSESSMENT — ENCOUNTER SYMPTOMS
PERSON REPORTING PAIN: PATIENT
PAIN LOCATION - PAIN QUALITY: STINGING
PAIN: 1
PAIN LOCATION: LEFT LEG
PAIN LOCATION - PAIN SEVERITY: 10/10

## 2024-09-18 ASSESSMENT — ACTIVITIES OF DAILY LIVING (ADL)
AMBULATION ASSISTANCE ON FLAT SURFACES: 1
AMBULATION_DISTANCE/DURATION_TOLERATED: 50 FT X2

## 2024-09-20 ENCOUNTER — HOME CARE VISIT (OUTPATIENT)
Dept: HOME HEALTH SERVICES | Facility: HOME HEALTH | Age: 89
End: 2024-09-20
Payer: MEDICARE

## 2024-09-20 PROCEDURE — G0158 HHC OT ASSISTANT EA 15: HCPCS | Mod: CO,HHH

## 2024-09-20 ASSESSMENT — ENCOUNTER SYMPTOMS
LOWEST PAIN SEVERITY IN PAST 24 HOURS: 7/10
HIGHEST PAIN SEVERITY IN PAST 24 HOURS: 10/10
PAIN LOCATION: RIGHT KNEE
AGGRESSION WITHIN DEFINED LIMITS: 1
SUBJECTIVE PAIN PROGRESSION: WAXING AND WANING
PAIN SEVERITY GOAL: 0/10
PERSON REPORTING PAIN: PATIENT
PAIN: 1
PAIN LOCATION - PAIN QUALITY: THROB
PAIN LOCATION - PAIN SEVERITY: 10/10
PAIN LOCATION - PAIN SEVERITY: 10/10
PAIN LOCATION: LEFT KNEE
ANGER WITHIN DEFINED LIMITS: 1
PAIN LOCATION - PAIN QUALITY: THROB

## 2024-09-20 ASSESSMENT — PAIN SCALES - PAIN ASSESSMENT IN ADVANCED DEMENTIA (PAINAD)
TOTALSCORE: 0
CONSOLABILITY: 0 - NO NEED TO CONSOLE.
BODYLANGUAGE: 0 - RELAXED.
FACIALEXPRESSION: 0
NEGVOCALIZATION: 0
FACIALEXPRESSION: 0 - SMILING OR INEXPRESSIVE.
CONSOLABILITY: 0
BODYLANGUAGE: 0
NEGVOCALIZATION: 0 - NONE.
BREATHING: 0

## 2024-09-20 ASSESSMENT — ACTIVITIES OF DAILY LIVING (ADL)
AMBULATION ASSISTANCE: STAND BY ASSIST
AMBULATION ASSISTANCE: ONE PERSON
PHYSICAL_TRANSFERS_DEVICES: W/W
DRESSING_UB_CURRENT_FUNCTION: INDEPENDENT
DRESSING_LB_CURRENT_FUNCTION: MODERATE ASSIST
CURRENT_FUNCTION: CONTACT GUARD ASSIST
BATHING_CURRENT_FUNCTION: STAND BY ASSIST
BATHING ASSESSED: 1
AMBULATION ASSISTANCE: 1
PHYSICAL TRANSFERS ASSESSED: 1

## 2024-09-24 ENCOUNTER — HOME CARE VISIT (OUTPATIENT)
Dept: HOME HEALTH SERVICES | Facility: HOME HEALTH | Age: 89
End: 2024-09-24
Payer: MEDICARE

## 2024-09-24 VITALS
HEART RATE: 84 BPM | TEMPERATURE: 98.6 F | SYSTOLIC BLOOD PRESSURE: 108 MMHG | DIASTOLIC BLOOD PRESSURE: 78 MMHG | RESPIRATION RATE: 18 BRPM | OXYGEN SATURATION: 99 %

## 2024-09-24 PROCEDURE — G0158 HHC OT ASSISTANT EA 15: HCPCS | Mod: CO,HHH

## 2024-09-24 PROCEDURE — G0300 HHS/HOSPICE OF LPN EA 15 MIN: HCPCS | Mod: HHH

## 2024-09-24 PROCEDURE — G0156 HHCP-SVS OF AIDE,EA 15 MIN: HCPCS | Mod: HHH

## 2024-09-24 ASSESSMENT — ENCOUNTER SYMPTOMS
HIGHEST PAIN SEVERITY IN PAST 24 HOURS: 0/10
AGGRESSION WITHIN DEFINED LIMITS: 1
PAIN LOCATION - PAIN FREQUENCY: WITH ACTIVITY
PAIN: 1
PAIN LOCATION - PAIN SEVERITY: 0/10
PAIN LOCATION - PAIN QUALITY: SHARP
LOWEST PAIN SEVERITY IN PAST 24 HOURS: 0/10
PAIN SEVERITY GOAL: 0/10
PERSON REPORTING PAIN: PATIENT
PAIN SEVERITY GOAL: 0/10
LOWEST PAIN SEVERITY IN PAST 24 HOURS: 0/10
HIGHEST PAIN SEVERITY IN PAST 24 HOURS: 10/10
CHANGE IN APPETITE: UNCHANGED
PAIN LOCATION - PAIN SEVERITY: 0/10
PAIN LOCATION: RIGHT KNEE
PERSON REPORTING PAIN: PATIENT
SUBJECTIVE PAIN PROGRESSION: WAXING AND WANING
DENIES PAIN: 1
PAIN LOCATION - PAIN FREQUENCY: WITH ACTIVITY
PAIN LOCATION - PAIN QUALITY: SHARP
ANGER WITHIN DEFINED LIMITS: 1
PAIN LOCATION: LEFT KNEE
APPETITE LEVEL: FAIR

## 2024-09-24 ASSESSMENT — PAIN SCALES - PAIN ASSESSMENT IN ADVANCED DEMENTIA (PAINAD)
BREATHING: 0
NEGVOCALIZATION: 0 - NONE.
BODYLANGUAGE: 0 - RELAXED.
CONSOLABILITY: 0
TOTALSCORE: 0
CONSOLABILITY: 0 - NO NEED TO CONSOLE.
FACIALEXPRESSION: 0
BODYLANGUAGE: 0
FACIALEXPRESSION: 0 - SMILING OR INEXPRESSIVE.
NEGVOCALIZATION: 0

## 2024-09-25 ENCOUNTER — HOME CARE VISIT (OUTPATIENT)
Dept: HOME HEALTH SERVICES | Facility: HOME HEALTH | Age: 89
End: 2024-09-25
Payer: MEDICARE

## 2024-09-25 PROCEDURE — G0157 HHC PT ASSISTANT EA 15: HCPCS | Mod: CQ,HHH

## 2024-09-25 SDOH — HEALTH STABILITY: PHYSICAL HEALTH: PHYSICAL EXERCISE: STANDING

## 2024-09-25 SDOH — HEALTH STABILITY: PHYSICAL HEALTH: PHYSICAL EXERCISE: 10

## 2024-09-25 SDOH — HEALTH STABILITY: PHYSICAL HEALTH: EXERCISE ACTIVITY: HRTR, MARCHING, HIP ABD, EXT, HAM CURLS

## 2024-09-25 SDOH — HEALTH STABILITY: PHYSICAL HEALTH: EXERCISE COMMENTS: ADVISED TO INCREASE REPS TO 12-15 AS TOL, VERB UNDERSTANDING

## 2024-09-25 SDOH — HEALTH STABILITY: PHYSICAL HEALTH: EXERCISE TYPE: STANDING LE STRENGTHENING

## 2024-09-25 SDOH — HEALTH STABILITY: PHYSICAL HEALTH: EXERCISE ACTIVITIES SETS: 1

## 2024-09-25 ASSESSMENT — ENCOUNTER SYMPTOMS
PERSON REPORTING PAIN: PATIENT
PAIN: 1
PAIN LOCATION - PAIN SEVERITY: 8/10
HIGHEST PAIN SEVERITY IN PAST 24 HOURS: 10/10
LOWEST PAIN SEVERITY IN PAST 24 HOURS: 5/10
PAIN LOCATION: LEFT KNEE

## 2024-09-25 ASSESSMENT — ACTIVITIES OF DAILY LIVING (ADL)
AMBULATION ASSISTANCE ON FLAT SURFACES: 1
AMBULATION_DISTANCE/DURATION_TOLERATED: 75 FT

## 2024-09-26 ENCOUNTER — HOME CARE VISIT (OUTPATIENT)
Dept: HOME HEALTH SERVICES | Facility: HOME HEALTH | Age: 89
End: 2024-09-26
Payer: MEDICARE

## 2024-09-26 PROCEDURE — G0158 HHC OT ASSISTANT EA 15: HCPCS | Mod: CO,HHH

## 2024-09-27 ENCOUNTER — HOME CARE VISIT (OUTPATIENT)
Dept: HOME HEALTH SERVICES | Facility: HOME HEALTH | Age: 89
End: 2024-09-27
Payer: MEDICARE

## 2024-09-27 PROCEDURE — G0156 HHCP-SVS OF AIDE,EA 15 MIN: HCPCS | Mod: HHH

## 2024-09-27 PROCEDURE — G0157 HHC PT ASSISTANT EA 15: HCPCS | Mod: CQ,HHH

## 2024-09-27 SDOH — HEALTH STABILITY: PHYSICAL HEALTH: EXERCISE TYPE: STANDING LE STRENGTHENING

## 2024-09-27 SDOH — HEALTH STABILITY: PHYSICAL HEALTH: EXERCISE ACTIVITIES SETS: 1

## 2024-09-27 SDOH — HEALTH STABILITY: PHYSICAL HEALTH: PHYSICAL EXERCISE: STANDING

## 2024-09-27 SDOH — HEALTH STABILITY: PHYSICAL HEALTH: EXERCISE ACTIVITY: HRTR, MARCHING, HIP ABD, EXT, HAM CURLS

## 2024-09-27 SDOH — HEALTH STABILITY: PHYSICAL HEALTH: PHYSICAL EXERCISE: 15

## 2024-09-27 ASSESSMENT — ENCOUNTER SYMPTOMS
LOWEST PAIN SEVERITY IN PAST 24 HOURS: 0/10
PAIN LOCATION: LEFT KNEE
PAIN: 1
LOWEST PAIN SEVERITY IN PAST 24 HOURS: 7/10
PAIN LOCATION - PAIN FREQUENCY: WITH ACTIVITY
PERSON REPORTING PAIN: PATIENT
HIGHEST PAIN SEVERITY IN PAST 24 HOURS: 9/10
ANGER WITHIN DEFINED LIMITS: 1
SUBJECTIVE PAIN PROGRESSION: UNCHANGED
AGGRESSION WITHIN DEFINED LIMITS: 1
PAIN LOCATION: LEFT KNEE
PAIN SEVERITY GOAL: 0/10
PAIN LOCATION - EXACERBATING FACTORS: TRANSITIONAL MOVEMENT
PAIN: 1
PERSON REPORTING PAIN: PATIENT
HIGHEST PAIN SEVERITY IN PAST 24 HOURS: 8/10
PAIN LOCATION - PAIN QUALITY: SHARP
PAIN LOCATION - PAIN SEVERITY: 8/10
PAIN LOCATION - PAIN SEVERITY: 7/10

## 2024-09-27 ASSESSMENT — PAIN SCALES - PAIN ASSESSMENT IN ADVANCED DEMENTIA (PAINAD)
NEGVOCALIZATION: 0 - NONE.
CONSOLABILITY: 0 - NO NEED TO CONSOLE.
TOTALSCORE: 0
BODYLANGUAGE: 0
BODYLANGUAGE: 0 - RELAXED.
BREATHING: 0
FACIALEXPRESSION: 0
FACIALEXPRESSION: 0 - SMILING OR INEXPRESSIVE.
CONSOLABILITY: 0
NEGVOCALIZATION: 0

## 2024-09-27 ASSESSMENT — ACTIVITIES OF DAILY LIVING (ADL)
AMBULATION_DISTANCE/DURATION_TOLERATED: 75 FT
AMBULATION ASSISTANCE ON FLAT SURFACES: 1

## 2024-09-30 ENCOUNTER — HOME CARE VISIT (OUTPATIENT)
Dept: HOME HEALTH SERVICES | Facility: HOME HEALTH | Age: 89
End: 2024-09-30
Payer: MEDICARE

## 2024-10-01 ENCOUNTER — HOME CARE VISIT (OUTPATIENT)
Dept: HOME HEALTH SERVICES | Facility: HOME HEALTH | Age: 89
End: 2024-10-01
Payer: MEDICARE

## 2024-10-01 PROCEDURE — G0157 HHC PT ASSISTANT EA 15: HCPCS | Mod: CQ,HHH

## 2024-10-01 PROCEDURE — G0156 HHCP-SVS OF AIDE,EA 15 MIN: HCPCS | Mod: HHH

## 2024-10-01 SDOH — HEALTH STABILITY: PHYSICAL HEALTH: EXERCISE ACTIVITY: STANDING LE STRENGTHENING

## 2024-10-01 SDOH — HEALTH STABILITY: PHYSICAL HEALTH: EXERCISE TYPE: FINALIZED STANDING LE STRENGTHENING PROGRAM

## 2024-10-01 ASSESSMENT — ACTIVITIES OF DAILY LIVING (ADL)
AMBULATION_DISTANCE/DURATION_TOLERATED: 100
AMBULATION ASSISTANCE ON FLAT SURFACES: 1

## 2024-10-01 ASSESSMENT — ENCOUNTER SYMPTOMS
LOWEST PAIN SEVERITY IN PAST 24 HOURS: 8/10
PAIN LOCATION - PAIN SEVERITY: 8/10
PERSON REPORTING PAIN: PATIENT
PAIN: 1
HIGHEST PAIN SEVERITY IN PAST 24 HOURS: 10/10
PAIN LOCATION: RIGHT SHOULDER

## 2024-10-02 ENCOUNTER — HOME CARE VISIT (OUTPATIENT)
Dept: HOME HEALTH SERVICES | Facility: HOME HEALTH | Age: 89
End: 2024-10-02
Payer: MEDICARE

## 2024-10-02 PROCEDURE — G0152 HHCP-SERV OF OT,EA 15 MIN: HCPCS | Mod: HHH

## 2024-10-02 SDOH — ECONOMIC STABILITY: HOUSING INSECURITY
HOME SAFETY: LIVES WITH GRANDAUGHTER AND ROOMMATE. SHOWER IN BASEMENT HAS GRAB BARS. NO FIRST FLOOR BATHROOM. STAYS ON 1ST FLOOR UNLESS GOING DOWN THE BASEMENT TO SHOWER. HAS BSC AND BEDROOM SETUP IN LIVING ROOM. DOES NOT PLAN ON RETURNING UPSTAIRS.

## 2024-10-02 ASSESSMENT — ENCOUNTER SYMPTOMS
PAIN LOCATION - PAIN FREQUENCY: INTERMITTENT
PAIN: 1
PERSON REPORTING PAIN: PATIENT
PAIN SEVERITY GOAL: 0/10
LOWEST PAIN SEVERITY IN PAST 24 HOURS: 0/10
PAIN LOCATION: RIGHT SHOULDER
PAIN LOCATION - PAIN DURATION: THIS MORNING
PAIN LOCATION - PAIN QUALITY: ACHES
HIGHEST PAIN SEVERITY IN PAST 24 HOURS: 6/10
PAIN LOCATION - PAIN SEVERITY: 6/10

## 2024-10-02 ASSESSMENT — ACTIVITIES OF DAILY LIVING (ADL)
BATHING_CURRENT_FUNCTION: MINIMUM ASSIST
BATHING ASSESSED: 1
LAUNDRY: DEPENDENT
AMBULATION ASSISTANCE: 1
LAUNDRY ASSESSED: 1
AMBULATION ASSISTANCE: INDEPENDENT
GROOMING_CURRENT_FUNCTION: INDEPENDENT
PREPARING MEALS: INDEPENDENT
DRESSING_UB_CURRENT_FUNCTION: INDEPENDENT
TOILETING: INDEPENDENT
HOUSEKEEPING ASSESSED: 1
LIGHT HOUSEKEEPING: INDEPENDENT
TOILETING: 1
GROOMING ASSESSED: 1
DRESSING_LB_CURRENT_FUNCTION: INDEPENDENT

## 2024-10-03 ENCOUNTER — HOME CARE VISIT (OUTPATIENT)
Dept: HOME HEALTH SERVICES | Facility: HOME HEALTH | Age: 89
End: 2024-10-03
Payer: MEDICARE

## 2024-10-03 VITALS
DIASTOLIC BLOOD PRESSURE: 64 MMHG | TEMPERATURE: 98.4 F | SYSTOLIC BLOOD PRESSURE: 128 MMHG | HEART RATE: 79 BPM | RESPIRATION RATE: 18 BRPM | OXYGEN SATURATION: 99 %

## 2024-10-03 PROCEDURE — G0299 HHS/HOSPICE OF RN EA 15 MIN: HCPCS | Mod: HHH

## 2024-10-03 ASSESSMENT — ENCOUNTER SYMPTOMS
DENIES PAIN: 1
CHANGE IN APPETITE: UNCHANGED
LOWER EXTREMITY EDEMA: 1
PERSON REPORTING PAIN: PATIENT
APPETITE LEVEL: GOOD

## 2024-10-04 ENCOUNTER — HOME CARE VISIT (OUTPATIENT)
Dept: HOME HEALTH SERVICES | Facility: HOME HEALTH | Age: 89
End: 2024-10-04
Payer: MEDICARE

## 2024-10-04 VITALS
SYSTOLIC BLOOD PRESSURE: 130 MMHG | TEMPERATURE: 98.9 F | DIASTOLIC BLOOD PRESSURE: 60 MMHG | HEART RATE: 77 BPM | OXYGEN SATURATION: 98 % | RESPIRATION RATE: 14 BRPM

## 2024-10-04 PROCEDURE — G0151 HHCP-SERV OF PT,EA 15 MIN: HCPCS | Mod: HHH

## 2024-10-04 SDOH — HEALTH STABILITY: PHYSICAL HEALTH: PHYSICAL EXERCISE: STANDING

## 2024-10-04 SDOH — HEALTH STABILITY: PHYSICAL HEALTH: PHYSICAL EXERCISE: 10

## 2024-10-04 SDOH — HEALTH STABILITY: PHYSICAL HEALTH: EXERCISE ACTIVITY: MINI SQUATS

## 2024-10-04 SDOH — HEALTH STABILITY: PHYSICAL HEALTH: EXERCISE ACTIVITIES SETS: 1

## 2024-10-04 SDOH — HEALTH STABILITY: PHYSICAL HEALTH: EXERCISE ACTIVITY: HEEL RAISES/TOE RAISES

## 2024-10-04 SDOH — HEALTH STABILITY: PHYSICAL HEALTH: EXERCISE ACTIVITY: HIP ABDUCTION

## 2024-10-04 SDOH — HEALTH STABILITY: PHYSICAL HEALTH: EXERCISE TYPE: LE EXERCISES

## 2024-10-04 SDOH — HEALTH STABILITY: PHYSICAL HEALTH: EXERCISE ACTIVITY: HS CURLS

## 2024-10-04 SDOH — HEALTH STABILITY: PHYSICAL HEALTH: EXERCISE ACTIVITY: MARCHING

## 2024-10-04 SDOH — HEALTH STABILITY: PHYSICAL HEALTH: EXERCISE ACTIVITY: HIP EXTENSION

## 2024-10-04 ASSESSMENT — BALANCE ASSESSMENTS
TURNING 360 DEGREES STEPS: 1 - CONTINUOUS STEPS
SITTING DOWN: 1 - USES ARMS OR NOT SMOOTH MOTION
EYES CLOSED AT MAXIMUM POSITION NUDGED: 1 - STEADY
BALANCE SCORE: 13
NUDGED: 2 - STEADY
ARISES: 1 - ABLE, USES ARMS TO HELP
IMMEDIATE STANDING BALANCE FIRST 5 SECONDS: 2 - STEADY WITHOUT WALKER OR OTHER SUPPORT
NUDGED SCORE: 2
SITTING BALANCE: 1 - STEADY, SAFE
ARISING SCORE: 1
STANDING BALANCE: 1 - STEADY BUT WIDE STANCE AND USES CANE OR OTHER SUPPORT
ATTEMPTS TO ARISE: 2 - ABLE TO RISE, ONE ATTEMPT

## 2024-10-04 ASSESSMENT — GAIT ASSESSMENTS
PATH: 1 - MILD/MODERATE DEVIATION OR USES WALKING AID
PATH SCORE: 1
BALANCE AND GAIT SCORE: 23
WALKING STANCE: 1 - HEELS ALMOST TOUCHING WHILE WALKING
STEP SYMMETRY: 1 - RIGHT AND LEFT STEP LENGTH APPEAR EQUAL
TRUNK: 1 - NO SWAY BUT FLEXION OF KNEES OR BACK OR SPREADS ARMS WHILE WALKING
TRUNK SCORE: 1
GAIT SCORE: 10
STEP CONTINUITY: 1 - STEPS APPEAR CONTINUOUS
INITIATION OF GAIT IMMEDIATELY AFTER GO: 1 - NO HESITANCY

## 2024-10-04 ASSESSMENT — ENCOUNTER SYMPTOMS
MUSCLE WEAKNESS: 1
DENIES PAIN: 1
PERSON REPORTING PAIN: PATIENT
OCCASIONAL FEELINGS OF UNSTEADINESS: 1

## 2024-10-04 ASSESSMENT — ACTIVITIES OF DAILY LIVING (ADL)
AMBULATION ASSISTANCE ON FLAT SURFACES: 1
AMBULATION_DISTANCE/DURATION_TOLERATED: 150 FT
OASIS_M1830: 01
HOME_HEALTH_OASIS: 00

## (undated) DEVICE — SHEATH, PINNACLE, W/.038 GW 10CM, 5FR INTRODUCER, 2.5 CM DIALATOR

## (undated) DEVICE — NEEDLE, ANGIOGRAPHY, W/SELDINGER SHEILD HUB, 18G

## (undated) DEVICE — Device

## (undated) DEVICE — TUBING, MANIFOLD, LOW PRESSURE

## (undated) DEVICE — CATHETER, DIAGNOSTIC, JUDKINS, LEFT, 5 FR-JL 4.0

## (undated) DEVICE — CATHETER, DIAGNOSTIC, 5FR,  PIG-145, 110CM, 6SH ANGLED

## (undated) DEVICE — CATHETER, DIAGNOSTIC, AMPLATZ, 5 FR, AL 1

## (undated) DEVICE — CATHETER, INFINITI DIAGNOSTIC, 5 FR 100CM 3DRC, WILLIAMS RIGHT OR NO TORQUE

## (undated) DEVICE — BANDAGE, QUIKCLOT, INTERVENTIONAL HEMO, W/O SLIT

## (undated) DEVICE — CLOSURE SYSTEM, VASCULAR, VASCADE, 5 F